# Patient Record
Sex: MALE | Race: WHITE
[De-identification: names, ages, dates, MRNs, and addresses within clinical notes are randomized per-mention and may not be internally consistent; named-entity substitution may affect disease eponyms.]

---

## 2020-04-13 ENCOUNTER — HOSPITAL ENCOUNTER (EMERGENCY)
Dept: HOSPITAL 97 - ER | Age: 70
LOS: 1 days | Discharge: ANOTHER HOSPITAL | End: 2020-04-14
Payer: COMMERCIAL

## 2020-04-13 DIAGNOSIS — I10: ICD-10-CM

## 2020-04-13 DIAGNOSIS — Z79.82: ICD-10-CM

## 2020-04-13 DIAGNOSIS — C15.9: ICD-10-CM

## 2020-04-13 DIAGNOSIS — Z88.2: ICD-10-CM

## 2020-04-13 DIAGNOSIS — K92.1: ICD-10-CM

## 2020-04-13 DIAGNOSIS — R04.2: Primary | ICD-10-CM

## 2020-04-13 DIAGNOSIS — C78.7: ICD-10-CM

## 2020-04-13 LAB
ALBUMIN SERPL BCP-MCNC: 2.1 G/DL (ref 3.4–5)
ALP SERPL-CCNC: 106 U/L (ref 45–117)
ALT SERPL W P-5'-P-CCNC: 15 U/L (ref 12–78)
AST SERPL W P-5'-P-CCNC: 12 U/L (ref 15–37)
BUN BLD-MCNC: 14 MG/DL (ref 7–18)
GLUCOSE SERPLBLD-MCNC: 134 MG/DL (ref 74–106)
HCT VFR BLD CALC: 28.8 % (ref 39.6–49)
INR BLD: 1.21
LYMPHOCYTES # SPEC AUTO: 0.8 K/UL (ref 0.7–4.9)
MAGNESIUM SERPL-MCNC: 1.7 MG/DL (ref 1.8–2.4)
NT-PROBNP SERPL-MCNC: 140 PG/ML (ref ?–125)
PMV BLD: 9.6 FL (ref 7.6–11.3)
POTASSIUM SERPL-SCNC: 3.9 MMOL/L (ref 3.5–5.1)
RBC # BLD: 3.33 M/UL (ref 4.33–5.43)
TROPONIN (EMERG DEPT USE ONLY): < 0.02 NG/ML (ref 0–0.04)

## 2020-04-13 PROCEDURE — 99285 EMERGENCY DEPT VISIT HI MDM: CPT

## 2020-04-13 PROCEDURE — 85025 COMPLETE CBC W/AUTO DIFF WBC: CPT

## 2020-04-13 PROCEDURE — 85610 PROTHROMBIN TIME: CPT

## 2020-04-13 PROCEDURE — 83735 ASSAY OF MAGNESIUM: CPT

## 2020-04-13 PROCEDURE — 86901 BLOOD TYPING SEROLOGIC RH(D): CPT

## 2020-04-13 PROCEDURE — 93005 ELECTROCARDIOGRAM TRACING: CPT

## 2020-04-13 PROCEDURE — 86900 BLOOD TYPING SEROLOGIC ABO: CPT

## 2020-04-13 PROCEDURE — 71045 X-RAY EXAM CHEST 1 VIEW: CPT

## 2020-04-13 PROCEDURE — 84484 ASSAY OF TROPONIN QUANT: CPT

## 2020-04-13 PROCEDURE — 86850 RBC ANTIBODY SCREEN: CPT

## 2020-04-13 PROCEDURE — 80048 BASIC METABOLIC PNL TOTAL CA: CPT

## 2020-04-13 PROCEDURE — 70450 CT HEAD/BRAIN W/O DYE: CPT

## 2020-04-13 PROCEDURE — 36415 COLL VENOUS BLD VENIPUNCTURE: CPT

## 2020-04-13 PROCEDURE — 83880 ASSAY OF NATRIURETIC PEPTIDE: CPT

## 2020-04-13 PROCEDURE — 36430 TRANSFUSION BLD/BLD COMPNT: CPT

## 2020-04-13 PROCEDURE — 80076 HEPATIC FUNCTION PANEL: CPT

## 2020-04-13 NOTE — EDPHYS
Physician Documentation                                                                           

 [facility]                                                                 

Name: Megan Julio                                                                         

Age: 69 yrs                                                                                       

Sex: Male                                                                                         

: 1950                                                                                   

MRN: [id number]                                                                                   

Arrival Date: 2020                                                                          

Time: 18:17                                                                                       

Account#: [id number]                                                                            

Bed 2                                                                                             

Private MD:                                                                                       

ED Physician Tho Delgado                                                                      

HPI:                                                                                              

                                                                                             

18:56 This 69 yrs old  Male presents to ER via EMS with complaints of Near Syncope.  pm1 

18:56 The patient has experienced near-syncope, felt faint.                                   pm1 

18:56 Onset: The symptoms/episode began/occurred today. Duration: This was a single episode.  pm1 

      Context: the episode(s) was witnessed, by family, wife, occurred at home, occurred          

      while the patient was standing up from sitting and walking to the table. Associated         

      injury: The patient did not suffer any apparent associated injury. Associated signs and     

      symptoms: Pertinent positives: dizziness, Pertinent negatives: abdominal pain, chest        

      pain, diarrhea, nausea, shortness of breath, vomiting. Current symptoms: Currently, the     

      patient is not experiencing any symptoms, the patient feels back to baseline.               

                                                                                                  

Historical:                                                                                       

- Allergies:                                                                                      

18:20 Sulfa (Sulfonamide Antibiotics);                                                        bp  

- Home Meds:                                                                                      

18:20 Lisinopril Oral [Active]; aspirin 81 mg Oral chew 1 tab once daily [Active]; Omeprazole bp  

      Oral [Active]; Zofran Oral [Active]; Tramadol Oral [Active];                                

- PMHx:                                                                                           

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  

                                                                                                  

- Immunization history:: Adult Immunizations up to date.                                          

- Social history:: Smoking status: Patient denies any tobacco usage or history of.                

                                                                                                  

                                                                                                  

ROS:                                                                                              

18:59 Constitutional: Negative for fever, chills, and weight loss, Cardiovascular: Negative   pm1 

      for chest pain, palpitations, and edema, Respiratory: Negative for shortness of breath,     

      cough, wheezing, and pleuritic chest pain, Abdomen/GI: Negative for abdominal pain,         

      nausea, vomiting, diarrhea, and constipation, Back: Negative for injury and pain,           

      MS/Extremity: Negative for injury and deformity, Skin: Negative for injury, rash, and       

      discoloration.                                                                              

18:59 Neuro: Positive for dizziness, Negative for headache, numbness, tingling, weakness.         

                                                                                                  

Exam:                                                                                             

18:59 Abdomen/GI: Exam negative for acute changes, Inspection: abdomen appears normal,        pm1 

      Palpation: abdomen is soft and non-tender, in all quadrants.                                

18:59 Constitutional:  This is a well developed, well nourished patient who is awake, alert,      

      and in no acute distress. Head/Face:  Normocephalic, atraumatic. Neck:  Trachea             

      midline, no thyromegaly or masses palpated, and no cervical lymphadenopathy.  Supple,       

      full range of motion without nuchal rigidity, or vertebral point tenderness.  No            

      Meningismus. Chest/axilla:  Normal chest wall appearance and motion.  Nontender with no     

      deformity.  No lesions are appreciated.                                                     

18:59 Abdomen/GI:  Soft, non-tender, with normal bowel sounds.  No distension or tympany.  No     

      guarding or rebound.  No evidence of tenderness throughout. Back:  No spinal                

      tenderness.  No costovertebral tenderness.  Full range of motion. Skin:  Warm, dry with     

      normal turgor.  Normal color with no rashes, no lesions, and no evidence of cellulitis.     

      MS/ Extremity:  Pulses equal, no cyanosis.  Neurovascular intact.  Full, normal range       

      of motion.                                                                                  

18:59 Cardiovascular: Exam negative for  acute changes, Rate: normal, Rhythm: regular,            

      Pulses: no pulse deficits are appreciated, Edema: is not appreciated.                       

18:59 Respiratory: Exam negative for  acute changes, respiratory distress, shortness of           

      breath, the patient does not display signs of respiratory distress,  Respirations:          

      normal.                                                                                     

18:59 Neuro: Exam negative for acute changes, focal neuro deficits, Orientation: is normal,       

      Mentation: is normal, Motor: is normal, moves all fours.                                    

                                                                                                  

Vital Signs:                                                                                      

18:17  / 69; Pulse 90; Resp 20; Temp 97.5; Pulse Ox 100% ; Weight 72.12 kg; Height 6    bp  

      ft. 1 in. (185.42 cm);                                                                      

18:30 BP 98 / 71; Pulse 85; Resp 20; Pulse Ox 100% ;                                          bp  

18:55 BP 90 / 70 Supine; Pulse 88; Resp 17; Pulse Ox 99% ;                                    hb  

18:55 BP 68 / 49 Sitting; Pulse 116; Resp 18; Pulse Ox 99% ;                                  hb  

19:40  / 64; Pulse 88; Resp 15; Pulse Ox 100% on R/A; Pain 0/10;                        ao  

20:13  / 67; Pulse 89; Resp 18; Pulse Ox 99% ;                                          ao  

21:20 BP 87 / 64; Pulse 96; Resp 17; Pulse Ox 99% ;                                           rr5 

21:55 BP 74 / 58; Pulse 99; Resp 18; Temp 97.2(TE); Pulse Ox 100% ;                           ao  

22:15 BP 67 / 58; Pulse 100; Resp 20; Temp 97.0(TE); Pulse Ox 99% on R/A;                     ao  

22:30 BP 77 / 65; Pulse 96; Resp 22; Temp 96.8(TE); Pulse Ox 100% ;                           ao  

22:36  / 69; Pulse 85; Resp 18; Pulse Ox 100% ;                                         ao  

23:00  / 77; Pulse 87; Resp 19; Pulse Ox 100% on R/A;                                   ao  

23:22  / 74; Pulse 87; Resp 16; Pulse Ox 100% on R/A;                                   ao  

18:17 Body Mass Index 20.98 (72.12 kg, 185.42 cm)                                             bp  

                                                                                                  

MDM:                                                                                              

18:22 Patient medically screened.                                                             higinio 

18:59 Data reviewed: vital signs. Data interpreted: Pulse oximetry: on room air is 100 %.     pm1 

      Interpretation: normal.                                                                     

20:40 ED course: Patient with bright red blood vomit at bedside. Estimate approximately 250   pm1 

      mL on floor. Patient with bowel movement afterwards - melena. Ordered blood                 

      transfusion, protonix, octreotide, zofran.                                                  

21:11 Counseling: I had a detailed discussion with the patient and/or guardian regarding: the pm1 

      historical points, exam findings, and any diagnostic results supporting the                 

      discharge/admit diagnosis, lab results, the need to transfer to another facility, for       

      higher level of care, Esophageal stent placement 6 days ago by Dr. Kacie Cartwright at [facility].                                                                      

22:11 Physician consultation: MICU Intensivist Dr. De Santiago regarding regarding transfer,      pm1 

      patient's condition, and will see patient Would like 2 liters LR Bolus, Additional          

      peripheral access, 2 Units of PRBCs. If patient vomits again, then intubate the patient     

      prior to transfer and update him.                                                           

                                                                                                  

                                                                                             

18:26 Order name: Basic Metabolic Panel; Complete Time: 19:15                                 pm1 

                                                                                             

18:26 Order name: CBC with Diff; Complete Time: 18:56                                         pm1 

                                                                                             

18:26 Order name: LFT's; Complete Time: 19:15                                                 pm1 

                                                                                             

18:26 Order name: Magnesium; Complete Time: 19:15                                             pm1 

                                                                                             

18:26 Order name: NT PRO-BNP; Complete Time: 19:15                                            pm                                                                                             

18:26 Order name: PT-INR; Complete Time: 18:56                                                pm1 

                                                                                             

18:26 Order name: Troponin (emerg Dept Use Only); Complete Time: 19:15                        pm1 

                                                                                             

18:26 Order name: XRAY Chest (1 view); Complete Time: 19:15                                   pm                                                                                             

18:26 Order name: CT Head Brain wo Cont; Complete Time: 19:15                                 pm1 

                                                                                             

20:35 Order name: Type And Screen                                                             pm1 

                                                                                             

21:08 Order name: Packed RBC Leukored                                                         EDMS

                                                                                             

21:49 Order name: ABO/RH no charge; Complete Time: 21:52                                      EDMS

                                                                                             

18:26 Order name: Cardiac monitoring; Complete Time: 18:29                                    pm1 

                                                                                             

18:26 Order name: EKG - Nurse/Tech; Complete Time: 19:41                                      pm1 

                                                                                             

18:26 Order name: IV Saline Lock; Complete Time: 18:29                                        pm1 

                                                                                             

18:26 Order name: Labs collected and sent; Complete Time: 18:37                               pm1 

                                                                                             

18:26 Order name: O2 Per Protocol; Complete Time: 18:29                                       pm                                                                                             

18:26 Order name: O2 Sat Monitoring; Complete Time: 18:29                                     pm1 

                                                                                             

18:26 Order name: Orthostatic Blood Pressure; Complete Time: 19:03                            pm1 

                                                                                             

20:35 Order name: Transfuse; Complete Time: 23:38                                             pm1 

                                                                                                  

Administered Medications:                                                                         

18:56 Drug: NS 0.9% 1000 ml Route: IV; Rate: 1000 ml; Site: left antecubital;                 bp  

20:00 Follow up: IV Status: Completed infusion; IV Intake: 100ml                              ao  

19:04 Drug: NS 0.9% 1000 ml Route: IV; Rate: 1000 ml; Site: left antecubital;                 hb  

23:52 Follow up: IV Status: Completed infusion; IV Intake: 1000ml                             ao  

19:40 Drug: Magnesium Sulfate 1 grams Route: IVPB; Infused Over: 1 hrs; Site: left            ao  

      antecubital;                                                                                

21:00 Follow up: IV Status: Completed infusion; IV Intake: 100ml                              ao  

21:10 Drug: Zofran (Ondansetron) 4 mg Route: IVP; Site: right antecubital;                    rr5 

23:53 Follow up: Response: No adverse reaction                                                ao  

21:12 Drug: ProTONIX 40 mg Route: IVP; Site: right antecubital;                               rr5 

23:53 Follow up: Response: No adverse reaction                                                ao  

21:14 Drug: Octreotide 50 mcg Route: IV; Rate: calculated rate; Site: right antecubital;      rr5 

23:53 Follow up: IV Status: Completed infusion                                                ao  

21:16 Drug: Octreotide Infusion (50 mcg/hr) - (Octreotide 500 mcg, NS 0.9% 500 ml) Route: IV; rr5 

      Rate: 50 ml/hr; Site: right antecubital;                                                    

23:53 Follow up: IV Status: Infusion continued upon transfer                                  ao  

21:18 Drug: ProTONIX 8 mg/hr Route: IV; Rate: 25 ml/hr; Site: left antecubital;               rr5 

23:53 Follow up: IV Status: Infusion continued upon admission                                 ao  

22:16 Drug: Lactated Ringers Solution 2000 ml Route: IV; Rate: 1000 ml/hr; Site: left         jd3 

      antecubital;                                                                                

23:54 Follow up: IV Status: Infusion continued upon transfer                                  ao  

22:26 Drug: Benadryl 12.5 mg Route: IVP; Site: left antecubital;                              rr5 

23:54 Follow up: Response: No adverse reaction                                                ao  

22:26 Drug: Solu-CORTEF 50 mg Route: IVP; Site: left antecubital;                             rr5 

23:54 Follow up: Response: No adverse reaction                                                ao  

23:45 Drug: Zofran (Ondansetron) 4 mg Route: IVP; Site: left wrist;                           ao  

23:54 Follow up: Response: No adverse reaction                                                ao  

                                                                                                  

                                                                                                  

Disposition:                                                                                      

                                                                                             

14:00 Co-signature as Attending Physician, Tho Delgado MD I agree with the assessment and  higinio 

      plan of care.                                                                               

                                                                                                  

Disposition:                                                                                      

20 21:16 Transfer ordered to [facility]. Diagnosis are           

  Gastrointestinal hemorrhage, unspecified, Near syncope.                                         

- Reason for transfer: [facility].                                

- Accepting physician is [facility].                                                           

- Condition is Stable.                                                                            

- Problem is new.                                                                                 

- Symptoms have improved.                                                                         

                                                                                                  

                                                                                                  

                                                                                                  

Signatures:                                                                                       

Dispatcher [employer]                           Tho Omer MD MD cha Ortiz, Alex, RN                         RN   ao                                                   

Enzo Wallis, NP                    NP   pm1                                                  

Brenda Cordova, RN                     RN   Saeed Pacheco, DANIEL                    RN   jd3                                                  

G[place], RN                      RN   bp                                                   

George Fox, RN                      RN   rr5                                                  

                                                                                                  

Corrections: (The following items were deleted from the chart)                                    

                                                                                             

21:20 21:16 2020 21:16 Transfer ordered to [facility].       pm1 

      Diagnosis is Gastrointestinal hemorrhage, unspecified. Reason for transfer: Private         

      Physician at [facility]. Accepting physician is [facility]. Condition is      

      Stable. Problem is new. Symptoms have improved. pm1                                         

                                                                                             

00:07  21:20 2020 21:16 Transfer ordered to [facility]. ao  

      Diagnosis is Gastrointestinal hemorrhage, unspecified; Near syncope. Reason for             

      transfer: [facility] Physician at [facility]. Accepting physician is [facility]     

      [facility]. Condition is Stable. Problem is new. Symptoms have improved. pm1                        

                                                                                                  

**************************************************************************************************

## 2020-04-13 NOTE — RAD REPORT
EXAM DESCRIPTION:  CT - Head Brain Wo Cont - 4/13/2020 6:38 pm

 

CLINICAL HISTORY:  DIZZINESS

 

COMPARISON:  HEAD BRAIN W O CONTRAST dated 11/6/2013

 

TECHNIQUE:  Axial 5 mm thick images of the head were obtained without IV contrast.

 

All CT scans are performed using dose optimization technique as appropriate and may include automated
 exposure control or mA/KV adjustment according to patient size.

 

FINDINGS:  No intracranial hemorrhage, mass, edema or shift of mid-line structures. No acute infarcti
on changes seen. No abnormal extra-axial fluid collections. Mild atrophy changes are present. Ventric
les are in proportion. Mild chronic ischemic change also evident.

 

Mastoid air cells and visualized portions of the paranasal sinuses are clear.

 

No acute bony findings.

 

 

IMPRESSION:  No acute intracranial finding identifiable.

 

Mild atrophy and mild chronic ischemic changes are present similar to comparison.

## 2020-04-13 NOTE — XMS REPORT
Patient Summary Document

                            Created on:2020



Patient:KAYLI DODSON

Sex:Male

:1950

External Reference #:[id number]





Demographics







                          Address                   [address] [place] [address]

 

                          Home Phone                [phone]

 

                          Work Phone                [phone]

 

                          Email Address             NONE

 

                          Preferred Language        Unknown

 

                          Marital Status            Unknown

 

                          Buddhist Affiliation     Unknown

 

                          Race                      Unknown

 

                          Ethnic Group              Unknown









Author







                          Organization              [facility]

t

 

                          Address                   [address] [place] [address] [place] [id number]

 

                          Phone                     [phone]









Care Team Providers







                    Name                Role                Phone

 

                    Unavailable         Unavailable         Unavailable









Problems

This patient has no known problems.



Allergies, Adverse Reactions, Alerts

This patient has no known allergies or adverse reactions.



Medications

This patient has no known medications.



Results







           Test Description Test Time  Test Comments Text Results Atomic Results

 Result 

Comments









             CATHIE LA,  2020   Reason for   FINAL REPORT PATIENT 



             NON-SPECIFIC, UP TO 1 12:58:00     exam:->dysphagia ID:   [id number] 

A 



             HOUR                                   fluoroscopic unit was 



                                                    utilized for a 



                                                    procedure performed 



                                                    in the operating 



                                                    room. No     



                                                    interpretation was 



                                                    requested. Please 



                                                    refer to the 



                                                    operative report 



                                                    regarding findings. 



                                                    Please refer to PACS 



                                                    for patient radiation 



                                                    dose information. 



                                                    Signed: JR Borrego Robert MDReport 



                                                    Verified Date/Time: 



                                                    2020 12:58:42 



                                                    Reading Location: [facility] 



                                                    Reading Room 



                                                    Electronically signed 



                                                    by: ALFREDO BORREGO on 



                                                    2020 12:58 PM

## 2020-04-13 NOTE — ER
Nurse's Notes                                                                                     

 [facility]                                                                 

Name: Megan Julio                                                                         

Age: 69 yrs                                                                                       

Sex: Male                                                                                         

: 1950                                                                                   

MRN: [id number]                                                                                   

Arrival Date: 2020                                                                          

Time: 18:17                                                                                       

Account#: [id number]                                                                            

Bed 2                                                                                             

Private MD:                                                                                       

Diagnosis: Gastrointestinal hemorrhage, unspecified;Near syncope                                  

                                                                                                  

Presentation:                                                                                     

                                                                                             

18:17 Chief complaint: EMS states: NEAR-SYNCOPE AT HOME WITH HYPOTENSION. Coronavirus screen: bp  

      Proceed with normal triage. Ebola Screen: No symptoms or risks identified at this time.     

      Initial Sepsis Screen: Does the patient meet any 2 criteria? No. Patient's initial          

      sepsis screen is negative. Does the patient have a suspected source of infection? No.       

      Patient's initial sepsis screen is negative. Risk Assessment: Do you want to hurt           

      yourself or someone else? Patient reports no desire to harm self or others. Onset of        

      symptoms was 2020.                                                                

18:17 Method Of Arrival: EMS: [facility]                                                bp  

18:17 Acuity: BRETT 3                                                                           bp  

18:21 Care prior to arrival: IV initiated. 20 GA, in the left antecubital area, Glucose       bp  

      check: 130.                                                                                 

                                                                                                  

Triage Assessment:                                                                                

18:20 General: Appears in no apparent distress. comfortable, Behavior is calm, cooperative,   bp  

      appropriate for age. Pain: Denies pain. EENT: No deficits noted. Neuro: Level of            

      Consciousness is awake, alert, obeys commands, Oriented to person, place, time,             

      situation, Appropriate for age. Cardiovascular: No deficits noted. Rhythm is sinus          

      rhythm. Respiratory: No deficits noted. GI: No signs and/or symptoms were reported          

      involving the gastrointestinal system. : No signs and/or symptoms were reported           

      regarding the genitourinary system. Derm: No deficits noted. Musculoskeletal: No            

      deficits noted.                                                                             

                                                                                                  

Historical:                                                                                       

- Allergies:                                                                                      

18:20 Sulfa (Sulfonamide Antibiotics);                                                        bp  

- Home Meds:                                                                                      

18:20 Lisinopril Oral [Active]; aspirin 81 mg Oral chew 1 tab once daily [Active]; Omeprazole bp  

      Oral [Active]; Zofran Oral [Active]; Tramadol Oral [Active];                                

- PMHx:                                                                                           

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  

                                                                                                  

- Immunization history:: Adult Immunizations up to date.                                          

- Social history:: Smoking status: Patient denies any tobacco usage or history of.                

                                                                                                  

                                                                                                  

Screenin:22 Abuse screen: Denies threats or abuse. Denies injuries from another. Nutritional        bp  

      screening: No deficits noted. Tuberculosis screening: No symptoms or risk factors           

      identified. Fall Risk None identified.                                                      

                                                                                                  

Assessment:                                                                                       

18:22 General: SEE TRIAGE NOTE.                                                               bp  

18:40 Reassessment: PT TO RADIOLOGY.                                                          bp  

18:56 Reassessment: PT RETURNED FROM CT.                                                      bp  

19:15 Reassessment: Patient appears in no apparent distress at this time. Patient and/or      ao  

      family updated on plan of care and expected duration. Pain level reassessed. Patient is     

      alert, oriented x 3, equal unlabored respirations, skin warm/dry/pink. Waiting on Dispo     

      orders.                                                                                     

20:13 Reassessment: Patient appears in no apparent distress at this time. Patient and/or      ao  

      family updated on plan of care and expected duration. Pain level reassessed.                

20:15 Reassessment: josy (patient's wife ) spoke to her for the plan of care and agreed.  rr5 

      josy's number [phone].                                                               

20:55 GI: Pt is actively vomiting bright red blood, Reports nausea, vomiting, went to         rr5 

      restroom assisted by [place], positive (blood mix with stool). ED provider aware with       

      order made and carried out.                                                                 

21:05 Reassessment: Patient was moved from his room because he was projectile vomiting red    ao  

      blood.                                                                                      

21:27 Reassessment: Notified PAT Giraldo of patient low BP.                                   ao  

21:30 Reassessment: Called Lab to check ET for blood to be ready. Per lab blood should be     ao  

      ready in 10 to 20 min. Lab will call back when ready.                                       

22:00 Reassessment: Patient appears in no apparent distress at this time. Patient and/or      ao  

      family updated on plan of care and expected duration. Pain level reassessed. Started        

      first unit.                                                                                 

23:00 Reassessment: Report given to DANIEL Deluna. Patient understand and agree with POC.        ao  

      Waiting on EMS for transportation.                                                          

23:00 Reassessment: Patient and/or family updated on plan of care and expected duration. Pain ao  

      level reassessed. Patient is alert, oriented x 3, equal unlabored respirations, skin        

      warm/dry/pink. Started second unit.                                                         

23:57 Reassessment: Patient appears in no apparent distress at this time. Patient and/or      ao  

      family updated on plan of care and expected duration. Pain level reassessed. Hand off       

      care to  EMS. Patient VS stable. No questions at this time.                               

                                                                                                  

Vital Signs:                                                                                      

18:17  / 69; Pulse 90; Resp 20; Temp 97.5; Pulse Ox 100% ; Weight 72.12 kg; Height 6    bp  

      ft. 1 in. (185.42 cm);                                                                      

18:30 BP 98 / 71; Pulse 85; Resp 20; Pulse Ox 100% ;                                          bp  

18:55 BP 90 / 70 Supine; Pulse 88; Resp 17; Pulse Ox 99% ;                                    hb  

18:55 BP 68 / 49 Sitting; Pulse 116; Resp 18; Pulse Ox 99% ;                                  hb  

19:40  / 64; Pulse 88; Resp 15; Pulse Ox 100% on R/A; Pain 0/10;                        ao  

20:13  / 67; Pulse 89; Resp 18; Pulse Ox 99% ;                                          ao  

21:20 BP 87 / 64; Pulse 96; Resp 17; Pulse Ox 99% ;                                           rr5 

21:55 BP 74 / 58; Pulse 99; Resp 18; Temp 97.2(TE); Pulse Ox 100% ;                           ao  

22:15 BP 67 / 58; Pulse 100; Resp 20; Temp 97.0(TE); Pulse Ox 99% on R/A;                     ao  

22:30 BP 77 / 65; Pulse 96; Resp 22; Temp 96.8(TE); Pulse Ox 100% ;                           ao  

22:36  / 69; Pulse 85; Resp 18; Pulse Ox 100% ;                                         ao  

23:00  / 77; Pulse 87; Resp 19; Pulse Ox 100% on R/A;                                   ao  

23:22  / 74; Pulse 87; Resp 16; Pulse Ox 100% on R/A;                                   ao  

18:17 Body Mass Index 20.98 (72.12 kg, 185.42 cm)                                             bp  

                                                                                                  

ED Course:                                                                                        

18:17 Patient arrived in ED.                                                                  bp  

18:18 Triage completed.                                                                       bp  

18:18 Patient has correct armband on for positive identification. Bed in low position. Call   jp3 

      light in reach. Side rails up X 1. Side rails up X2. Warm blanket given. Verbal             

      reassurance given. Cardiac monitor on. Pulse ox on. NIBP on.                                

18:18 Maintain EMS IV. Dressing intact. Good blood return noted. Site clean \T\ dry. Gauge \T\    nicolasa
3

      site: 20-gauge LAC. Patient maintains SpO2 saturation greater than 95% on room air.         

18:19 Enzo Wallis NP is PHCP.                                                           pm1 

18:19 Tho Delgado MD is Attending Physician.                                             pm1 

18:20 Arm band placed on.                                                                     bp  

18:29 [place], DANIEL is Primary Nurse.                                                    bp  

18:38 CT Head Brain wo Cont In Process Unspecified.                                           EDMS

18:44 XRAY Chest (1 view) In Process Unspecified.                                             EDMS

19:14 Report received from DANIEL Carroll.                                                         ao  

19:15 EKG done, by ED staff.                                                                  ao  

21:10 Inserted saline lock: 20 gauge in right antecubital area, using aseptic technique.      rr5 

      Blood collected.                                                                            

22:07 Inserted saline lock: 22 gauge in left wrist, using aseptic technique.                  jd3 

23:58 No provider procedures requiring assistance completed. Patient transferred, IV remains  ao  

      in place.                                                                                   

                                                                                                  

Administered Medications:                                                                         

18:56 Drug: NS 0.9% 1000 ml Route: IV; Rate: 1000 ml; Site: left antecubital;                 bp  

20:00 Follow up: IV Status: Completed infusion; IV Intake: 100ml                              ao  

19:04 Drug: NS 0.9% 1000 ml Route: IV; Rate: 1000 ml; Site: left antecubital;                 hb  

23:52 Follow up: IV Status: Completed infusion; IV Intake: 1000ml                             ao  

19:40 Drug: Magnesium Sulfate 1 grams Route: IVPB; Infused Over: 1 hrs; Site: left            ao  

      antecubital;                                                                                

21:00 Follow up: IV Status: Completed infusion; IV Intake: 100ml                              ao  

21:10 Drug: Zofran (Ondansetron) 4 mg Route: IVP; Site: right antecubital;                    rr5 

23:53 Follow up: Response: No adverse reaction                                                ao  

21:12 Drug: ProTONIX 40 mg Route: IVP; Site: right antecubital;                               rr5 

23:53 Follow up: Response: No adverse reaction                                                ao  

21:14 Drug: Octreotide 50 mcg Route: IV; Rate: calculated rate; Site: right antecubital;      rr5 

23:53 Follow up: IV Status: Completed infusion                                                ao  

21:16 Drug: Octreotide Infusion (50 mcg/hr) - (Octreotide 500 mcg, NS 0.9% 500 ml) Route: IV; rr5 

      Rate: 50 ml/hr; Site: right antecubital;                                                    

23:53 Follow up: IV Status: Infusion continued upon transfer                                  ao  

21:18 Drug: ProTONIX 8 mg/hr Route: IV; Rate: 25 ml/hr; Site: left antecubital;               rr5 

23:53 Follow up: IV Status: Infusion continued upon admission                                 ao  

22:16 Drug: Lactated Ringers Solution 2000 ml Route: IV; Rate: 1000 ml/hr; Site: left         jd3 

      antecubital;                                                                                

23:54 Follow up: IV Status: Infusion continued upon transfer                                  ao  

22:26 Drug: Benadryl 12.5 mg Route: IVP; Site: left antecubital;                              rr5 

23:54 Follow up: Response: No adverse reaction                                                ao  

22:26 Drug: Solu-CORTEF 50 mg Route: IVP; Site: left antecubital;                             rr5 

23:54 Follow up: Response: No adverse reaction                                                ao  

23:45 Drug: Zofran (Ondansetron) 4 mg Route: IVP; Site: left wrist;                           ao  

23:54 Follow up: Response: No adverse reaction                                                ao  

                                                                                                  

                                                                                                  

Intake:                                                                                           

20:00 IV: 100ml; Total: 100ml.                                                                ao  

21:00 IV: 100ml; Total: 200ml.                                                                ao  

23:52 IV: 1000ml; Total: 1200ml.                                                              ao  

                                                                                                  

Outcome:                                                                                          

21:16 ER care complete, transfer ordered by MD.                                               pm1 

23:58 Transferred by ground EMS to [facility], Transfer form completed.    ao  

23:58 Condition: stable                                                                           

23:58 Instructed on the need for transfer.                                                        

                                                                                             

00:07 Patient left the ED.                                                                    ao  

                                                                                                  

Signatures:                                                                                       

Dispatcher [employer]                           EDMS                                                 

Ismael Mora RN                         RN   ao                                                   

Enzo Wallis, NP                    NP   pm1                                                  

Brenda Cordova RN RN hb Davies, Jonathon, RN RN   jGlen Swanson RN RN bp Pisarski, Jacob                              jp3                                                  

George Fox RN                      RN   rr5                                                  

                                                                                                  

**************************************************************************************************

## 2020-04-13 NOTE — RAD REPORT
EXAM DESCRIPTION:  RAD - Chest Single View - 4/13/2020 6:44 pm

 

CLINICAL HISTORY:  Near syncope, hypotension, CVA history

 

COMPARISON:  Two view chest March 5

 

TECHNIQUE:  AP portable chest image was obtained 4/13/2020 6:44 pm .

 

FINDINGS:  Lung volumes are low compared to prior imaging study. No focal lung parenchymal process. N
o failure or volume overload. Right-sided Port-A-Cath has been placed since prior imaging. Heart and 
vasculature are normal. No measurable pleural effusion and no pneumothorax. No acute bony abnormality
 seen. No acute aortic findings suspected.

 

IMPRESSION:  No acute cardiopulmonary process.

 

No worrisome change from comparison.

## 2020-04-14 VITALS — DIASTOLIC BLOOD PRESSURE: 74 MMHG | SYSTOLIC BLOOD PRESSURE: 114 MMHG

## 2020-04-14 VITALS — TEMPERATURE: 96.8 F | OXYGEN SATURATION: 100 %

## 2020-04-14 PROCEDURE — 30233N1 TRANSFUSION OF NONAUTOLOGOUS RED BLOOD CELLS INTO PERIPHERAL VEIN, PERCUTANEOUS APPROACH: ICD-10-PCS

## 2020-04-14 NOTE — EKG
Test Date:    2020-04-13               Test Time:    19:12:02

Technician:   HERBER                                     

                                                     

MEASUREMENT RESULTS:                                       

Intervals:                                           

Rate:         79                                     

MD:           136                                    

QRSD:         86                                     

QT:           388                                    

QTc:          444                                    

Axis:                                                

P:            61                                     

MD:           136                                    

QRS:          48                                     

T:            64                                     

                                                     

INTERPRETIVE STATEMENTS:                                       

                                                     

Normal sinus rhythm

Normal ECG

Compared to ECG 11/06/2013 17:12:30

ST (T wave) deviation no longer present



Electronically Signed On 04-14-20 11:29:55 CDT by Maurice Perez

## 2020-07-04 ENCOUNTER — HOSPITAL ENCOUNTER (EMERGENCY)
Dept: HOSPITAL 97 - ER | Age: 70
Discharge: ANOTHER HOSPITAL | End: 2020-07-04
Payer: COMMERCIAL

## 2020-07-04 VITALS — SYSTOLIC BLOOD PRESSURE: 139 MMHG | TEMPERATURE: 98.2 F | DIASTOLIC BLOOD PRESSURE: 81 MMHG | OXYGEN SATURATION: 99 %

## 2020-07-04 DIAGNOSIS — I10: ICD-10-CM

## 2020-07-04 DIAGNOSIS — Z88.2: ICD-10-CM

## 2020-07-04 DIAGNOSIS — K22.2: Primary | ICD-10-CM

## 2020-07-04 DIAGNOSIS — Z85.05: ICD-10-CM

## 2020-07-04 DIAGNOSIS — Z85.89: ICD-10-CM

## 2020-07-04 DIAGNOSIS — Z85.01: ICD-10-CM

## 2020-07-04 DIAGNOSIS — Z79.82: ICD-10-CM

## 2020-07-04 LAB
ALBUMIN SERPL BCP-MCNC: 2.7 G/DL (ref 3.4–5)
ALP SERPL-CCNC: 102 U/L (ref 45–117)
ALT SERPL W P-5'-P-CCNC: 22 U/L (ref 12–78)
ANISOCYTOSIS BLD QL: (no result)
AST SERPL W P-5'-P-CCNC: 20 U/L (ref 15–37)
BLD SMEAR INTERP: (no result)
BUN BLD-MCNC: 10 MG/DL (ref 7–18)
GLUCOSE SERPLBLD-MCNC: 94 MG/DL (ref 74–106)
HCT VFR BLD CALC: 37.3 % (ref 39.6–49)
LIPASE SERPL-CCNC: 83 U/L (ref 73–393)
LYMPHOCYTES # SPEC AUTO: 0.9 K/UL (ref 0.7–4.9)
MORPHOLOGY BLD-IMP: (no result)
PMV BLD: 9.3 FL (ref 7.6–11.3)
POTASSIUM SERPL-SCNC: 3.6 MMOL/L (ref 3.5–5.1)
RBC # BLD: 4.13 M/UL (ref 4.33–5.43)

## 2020-07-04 PROCEDURE — 36415 COLL VENOUS BLD VENIPUNCTURE: CPT

## 2020-07-04 PROCEDURE — 80076 HEPATIC FUNCTION PANEL: CPT

## 2020-07-04 PROCEDURE — 85025 COMPLETE CBC W/AUTO DIFF WBC: CPT

## 2020-07-04 PROCEDURE — 83690 ASSAY OF LIPASE: CPT

## 2020-07-04 PROCEDURE — 80048 BASIC METABOLIC PNL TOTAL CA: CPT

## 2020-07-04 PROCEDURE — 99285 EMERGENCY DEPT VISIT HI MDM: CPT

## 2020-07-04 PROCEDURE — 96366 THER/PROPH/DIAG IV INF ADDON: CPT

## 2020-07-04 PROCEDURE — 96365 THER/PROPH/DIAG IV INF INIT: CPT

## 2020-07-04 NOTE — XMS REPORT
Clinical Summary

                             Created on:2020



Patient:Megan Avery

Sex:Male

:1950

External Reference #:[id number]





Demographics







                          Address                   [address] [place] [address]

 

                          Home Phone                [phone]

 

                          Mobile Phone              [phone]

 

                          Email Address             [email or web address]

 

                          Preferred Language        English

 

                          Marital Status            

 

                          Denominational Affiliation     Unknown

 

                          Race                      White

 

                          Ethnic Group              Not  or 









Author







                          Organization              [employer]

 

                          Address                   [address] [place] [address]









Support







                Name            Relationship    Address         Phone

 

                Josephine Avery Spouse          [address]  [phone] 



                                                [place] [address] 









Care Team Providers







                    Name                Role                Phone

 

                    Ramu Harris A       Primary Care Provider [phone]









Allergies







             Active Allergy Reactions    Severity     Noted Date   Comments

 

             Sulfa (Sulfonamide Other (See Comments)              2020   F

judy like symptoms



             Antibiotics)                                        







Medications







          Medication Sig       Dispensed Refills   Start Date End Date  Status

 

          aspirin (ECOTRIN) 81 Take 81 mg by           0                        

     Active



          MG enteric coated mouth daily.                                        

 



          tablet                                                      

 

          pantoprazole Take 40 mg by           0         2020           Ac

tive



          (PROTONIX) 40 MG EC mouth 2 (two)                                     

    



          tablet    times a day.                                         

 

          ondansetron ODT Take 8 mg by           0                             A

ctive



          (ZOFRAN-ODT) 8 MG mouth 2 (two)                                       

  



          disintegrating tablet times a day.                                    

     

 

          traMADoL (ULTRAM) 50 Take 50 mg by           0         2020     

      Active



          mg tablet mouth.                                            

 

          lisinopriL (PRINIVIL)                     0         2020

02 Discontinued



          5 mg tablet                                         0         

 

          prochlorperazine Take 1 tablet 30 tablet 5         2020

2 



          (Compazine) 5 MG (5 mg total)                               0         



          tabletIndications: GE by mouth                                        

  



          junction carcinoma every 6 (six)                                      

   



          (HCC)     hours as                                          



                    needed for                                         



                    nausea or                                         



                    vomiting for                                         



                    up to 30                                          



                    days.                                             

 

          ondansetron ODT Take 1 tablet 30 tablet 5         2020

 



          (Zofran ODT) 8 MG (8 mg total)                               0        

 



          disintegrating by mouth                                          



          tabletIndications: GE every 8                                         

  



          junction carcinoma (eight) hours                                      

   



          (HCC)     as needed for                                         



                    nausea or                                         



                    vomiting for                                         



                    up to 30                                          



                    days.                                             

 

          dronabinoL (Marinol) Take 1    20 capsule 0         2020

02 



          2.5 MG capsule capsule (2.5                               0         



                    mg total) by                                         



                    mouth 2 (two)                                         



                    times a day                                         



                    before meals                                         



                    for 10 days.                                         







Active Problems







                          Problem                   Noted Date

 

                          Hypotension due to drugs  2020

 

                          Dehydration               2020

 

                          GE junction carcinoma     2020

 

                          Liver metastases          2020







Resolved Problems







                    Problem             Noted Date          Resolved Date

 

                    Gastrointestinal hemorrhage associated with gastric ulcer 2020

 

                    Esophageal dysphagia 2020







Encounters







             Date         Type         Specialty    Care Team    Description

 

             2020   Infusion     Oncology     Nikolas Mcgregork  GE junction car

sophiaoma



                                                    MD Jose   (HCC) (Primary 

Dx)

 

             2020   Office Visit Oncology     Matt Mcgregor  GE junction car

cinoma (HCC) 

(Primary Dx);



                                                    MD Jose   Liver metastase

s (HCC)

 

             2020   Travel                                 

 

             2020   Orders Only  Oncology     Matt Mcgregor  GE junction car

sophiaoma



                                                    MD Jose   (HCC) (Primary 

Dx)

 

             2020   Oncology     Oncology     Champagne,   



                          [facility]              Cara, DANIEL 

 

             2020   Infusion     Oncology     Matt Mcgregor  GE junction car

cinoma (HCC) (Primary 

Dx);



                                                    MD Jose   Liver metastase

s (HCC)

 

             2020   Orders Only  Oncology     Jazlyn Wood, DANIEL     

 

             2020   Orders Only  Oncology     Savannah Bolivar, [facility]  

 

             2020   Oncology     Oncology     ChampagnZoe jenkins, DANIEL 

 

             2020   Travel                                 

 

             2020   Orders Only  Pharmacy     Khadijah Tejada, [facility] 

 

             2020   Transcribe Orders Access       Kacie Cartwright Malignant 

neoplasm of 

esophagus, unspecified (HCC) (Primary Dx);



                                                    MD JOHNATHAN       Dysphagia, unsp

ecified;



                                                                 Pre-operative c

learance

 

             2020   Oncology     Oncology     Champagne,   



                          [facility]              Cara, DANIEL 

 

             2020   Telephone    Rehabilitation Genet Davila MT-BC        

 

             2020   Nurse Only   Oncology     Nikolas Mcgregork  GE junction car

cinoma (HCC) (Primary 

Dx);



                                                    MD Jose   Dehydration

 

             2020   Orders Only  Oncology     Martita Ji, [facility] 

 

             2020   Travel                                 

 

             2020   Oncology     Oncology     Champagne,   



                          [facility]              Cara, DANIEL 

 

             2020   Orders Only  Oncology     Jazlyn Wood RN     

 

             2020   Oncology     Oncology     Champagne   



                          [facility]              Cara, DANIEL 

 

             06/15/2020   Infusion     Oncology     Matt Mcgregor  GE junction car

bob Garcia MD   (HCC) (Primary 

Dx)

 

             06/15/2020   Oncology     Oncology     ChampagneZoe, DANIEL 

 

             06/15/2020   Travel                                 

 

             2020   Orders Only  Khadijah Franklin RPH 

 

             06/10/2020   Hospital Encounter Radiology    Kacie Cartwright Abnormal 

findings on



                                                    M., MD       diagnostic imag

ing of



                                                                 other parts of



                                                                 digestive tract

 

             06/10/2020   Surgery      Gastroenterology Kacie Cartwright EGD W/ STEN

T W/



                                                    MD LACHO MANN

 

                06/10/2020      Anesthesia Event Gastroenterology Lisset Mullins MD Crawley, Teri, 



                                                    PAT           

 

             06/10/2020   Hospital Encounter Gastroenterology Kacie Cartwright MD       

 

             2020   Travel                                 

 

             2020   Travel                                 

 

             2020   Telephone    Oncology     Matt Mcgregor MD   

 

             2020   Oncology     Oncology     Champagne,   



                          [facility]              Cara, DANIEL 

 

             2020   Telephone    Oncology     Wood Lotti GE junction car

cinoma (HCC) (Primary 

Dx);



                                                    DANIEL Smith     Dysphagia, unsp

ecified type

 

             2020   Orders Only  Oncology     Wood, Lotti GE junction car

bob Smith RN     (HCC) (Primary 

Dx)

 

             2020   Oncology     Oncology     ChampagneZoe, DANIEL 

 

             2020   Nurse Only   Oncology     Matt Mcgregor  GE junction car

bob Garcia MD   (HCC) (Primary 

Dx)

 

             2020   Travel                                 

 

             2020   Orders Only  Oncology     Payal Conde RN  

 

             2020   Office Visit Oncology     Matt Mcgregor  GE junction car

cinoma (HCC) 

(Primary Dx);



                                                    MD Jose   Liver metastase

s (HCC)

 

             2020   Infusion     Oncology     Matt Mcgregor  GE junction car

cinoma (HCC) (Primary 

Dx);



                                                    MD Jose   Liver metastase

s (HCC)

 

             2020   Hospital Encounter Radiology    Matt Mcgregor juncti

on carcinoma (HCC);



                                                    MD Jose   Liver metastase

s (HCC)

 

             2020   Oncology     Oncology     Zoe Jovel RN 

 

             2020   Travel                                 

 

             05/15/2020   Office Visit Cardiology   [place],      Palpitations (P

rimary Dx);



                                                    [place]    GE junction car

bob (HCC)



                                                    PAT Dill    

 

             05/15/2020   Nurse Only   Oncology     Matt Mcgregor  GE junction car

bob Garcia MD   (HCC) (Primary 

Dx)

 

             05/15/2020   Travel                                 

 

             2020   Infusion     Oncology     Matt Mcgregor  GE junction car

bob Garcia MD   (HCC) (Primary 

Dx)

 

             2020   Oncology     Oncology     Zoe Jovel RN 

 

             2020   Travel                                 

 

             2020   Orders Only  Pharmacy     Khadijah Tejada [facility] 

 

             2020   Telephone    Oncology     Matt Mcgregor MD   

 

             2020   Travel                                 

 

             2020   Telephone    Oncology     Jazlyn Wood junction car

bob Smith RN     (HCC) (Primary 

Dx)

 

             2020   Orders Only  Oncology     Jazlyn Wood RN     

 

             2020   Nurse Only   Oncology     Matt Mcgregor  GE junction car

bob Garcia MD   (HCC) (Primary 

Dx)

 

             2020   Hospital Encounter Procedural Cardiology Matt Mcgregor  

Encounter for



                                                    MD Jose   antineoplastic



                                                                 chemotherapy

 

             2020   Travel                                 

 

             2020   Orders Only  Oncology     [place]      GE brenda car

bob (HCC) (Primary 

Dx);



                                                    KEE Lenz   Liver metastase

s (HCC)

 

             2020   Telephone    Oncology     Matt Mcgregor MD   

 

             2020   Telephone    Oncology     Jazlyn Wood RN     

 

             2020   Travel                                 

 

             2020   Documentation Oncology     Cara Jovel RN 

 

             2020   Documentation Oncology     Cara Jovel RN 

 

             2020   Office Visit Oncology     Matt Mcgregor  Gastrointestina

l hemorrhage 

associated with gastric ulcer (Primary Dx);



                                                    MD Jose   GE junction car

sophiaoma (HCC);



                                                                 Liver metastase

s (HCC);



                                                                 Hypotension due

 to drugs;



                                                                 Esophageal dysp

hagia

 

             2020   Infusion     Oncology     Matt Mcgregor  GE junction car

bob Garcia MD   (HCC) (Primary 

Dx)

 

             2020   Oncology     Oncology     Zoe Jovel RN 

 

             2020   Orders Only  Oncology     [place],      Encounter for



                                                    KEE Lenz   antineoplastic



                                                                 chemotherapy (P

rimary



                                                                 Dx)

 

             2020   Telephone    Oncology     Matt Mcgregor MD   

 

             2020   Telephone    Oncology     Cara Jovel, DANIEL 

 

             2020   Orders Only  Pharmacy     Khadijah Tejada, [facility] 

 

             2020   Travel                                 

 

             2020   Documentation Oncology     Cara Jovel, DANIEL 

 

             2020   Telephone    Oncology     Matt Mcgregor MD   

 

             2020   Telephone Consult Oncology     Matt Mcgregor  GE junctio

n carcinoma (HCC) 

(Primary Dx);



                                                    MD Jose   Esophageal dysp

hagia;



                                                                 Liver metastase

s (HCC)

 

             2020   Orders Only  Oncology     [place],      Encounter for



                                                    KEE Lenz   antineoplastic



                                                                 chemotherapy (P

rimary



                                                                 Dx)

 

             2020   Telephone    Oncology     Cara Jovel, DANIEL 

 

             2020   Orders Only  Oncology     Martita Ji, [facility] 

 

             2020   Orders Only  Oncology     Jazlyn Wood RN     

 

             2020   Telephone    Oncology     Deya Gordillo RD           

 

             2020   Telephone    Oncology     Jazlyn Wood RN     

 

             04/15/2020   Telephone    Oncology     Cara Jovel RN 

 

             2020   Telephone    Oncology     Yoanna Griggs MA   

 

             2020   Telephone    Oncology     Matt Mcgregor MD   

 

             2020   Travel                                 

 

             2020   Documentation Oncology     Uche Santos RN  

 

             2020   Telephone    Oncology     Cara Jovel RN 

 

             2020   Orders Only  Oncology     [place],      Encounter for a

ntineoplastic 

chemotherapy (Primary Dx);



                                                    KEE Lenz   GE junction car

cinoma (HCC)

 

             2020   Orders Only  Oncology     Matt Mcgregor MD   

 

             2020   Orders Only  Oncology     aMrtita Ji, ESPERANZA 

 

             2020   Orders Only  Oncology     Jazlyn Wood RN     

 

             2020   Telephone    Oncology     Matt Mcgregor MD   

 

             2020   Telephone    Oncology     Cara Jovel, DANIEL 

 

             2020   Telephone    Oncology     Deya Gordillo RD           

 

             2020   Orders Only  Oncology     Jazlyn Wood RN     

 

             2020   Telephone    Oncology     Jazlyn Wood RN     

 

             2020   Telephone    Oncology     Deya Gordillo, 



                                                    MIKEY           

 

             2020   Telephone    Oncology     Cara Jovel, RN 

 

             2020   Telephone    Oncology     Cara Jovel, RN 

 

             2020   Nurse Only   Oncology     Matt Mcgregor  GE junction ginny Garcia MD   (HCC) (Primary 

Dx)

 

             2020   Travel                                 

 

             2020   Telephone    Oncology     Cara Jovel, RN 

 

             2020   Social Work  Oncology     Cara Rendon, [facility] 

 

             2020   Infusion     Oncology     Matt Mcgregor  GE junction ginny Garcia MD   (HCC) (Primary 

Dx)

 

             2020   Documentation Oncology     Martita iJ [facility] 

 

             2020   Telephone    Oncology     Matt Mcgregor MD   

 

             2020   Telephone    Oncology     Matt Mcgregor MD   

 

             2020   Telephone    Oncology     Matt Mcgregor MD   

 

             2020   Travel                                 

 

             2020   Hospital Encounter Radiology    Matt Mcgregor  Malignant

 neoplasm of 

esophagus, unspecified location (HCC);



                                                    MD Jose   Pre-op testing

 

             2020   Telephone    Oncology     Matt Mcgregor MD   

 

             2020   Telephone    Oncology     Jazlyn Wood RN     

 

             2020   Telephone    Oncology     Matt Mcgregor MD   

 

             2020   Telephone    Oncology     Matt Mcgregor MD   

 

             2020   Travel                                 

 

             2020   Telephone    Oncology     Matt Mcgregor MD   

 

             2020   Orders Only  Oncology     Matt Mcgregor  GE brenda Garcia MD   (HCC) (Primary 

Dx)

 

             2020   Telephone    Radiology    Ashlee Shields, DANIEL    

 

             2020   Oncology     Oncology     Zoe Jovel RN 

 

             2020   Documentation Medical Records Provider,    



                                                    Unknown      

 

             2020   Orders Only  Oncology     Martita Ji [facility] 

 

             2020   Orders Only  Oncology     [place],     GE junction car

bob Anders RPH (HCC) (Primary 

Dx)

 

             2020   Hospital Encounter Radiology    Matt Mcgregor MD   

 

             2020   Hospital Encounter Radiology    Matt Mcgregor MD   

 

             2020   Hospital Encounter Radiology    Matt Mcgregor  Malignant

 neoplasm of



                                                    MD Jose   esophagus,



                                                                 unspecified loc

ation



                                                                 (HCC)

 

             2020   Lab          Lab          Matt Mcgregor  Malignant neopl

asm of esophagus, unspecified 

location (HCC);



                                                    MD Jose   Pre-op testing

 

             2020   Consult      Oncology     Matt Mcgregor  Liver metastase

s (HCC) (Primary Dx);



                                                    MD Jose   GE junction car

cinoma (HCC)

 

             2020   Orders Only  Oncology     [place],      Malignant neopl

asm of



                                                    KEE Lenz   esophagus,



                                                                 unspecified loc

ation



                                                                 (HCC) (Primary 

Dx)

 

             2020   Orders Only  Oncology     [place],      Malignant neopl

asm of esophagus, 

unspecified location (HCC) (Primary Dx);



                                                    KEE Lenz   Pre-op testing

 

             2020   Travel                                 

 

             2020   Telephone    Oncology     Matt Mcgregor MD   

 

             2020   Office Visit General Surgery [place],   Malignant ne

oplasm of



                                                    Patrick BUTTERFIELD MD  esophagus,



                                                                 unspecified loc

ation



                                                                 (HCC) (Primary 

Dx)

 

             2020   Travel                                 

 

             2020   Travel                                 



after 2019



Family History







                Medical History Relation        Name            Comments

 

                Heart attack    Father                          

 

                Breast cancer   Mother                          









                Relation        Name            Status          Comments

 

                Father                                          

 

                Mother                                          







Social History







             Tobacco Use  Types        Packs/Day    Years Used   Date

 

             Former Smoker Cigars       1                         Quit: 2020









                Smokeless Tobacco: Never Used                                 









                Alcohol Use     Drinks/Week     oz/Week         Comments

 

                Never                                           









                    Alcohol Habits      Answer              Date Recorded

 

                    How often do you have a drink containing alcohol? Never     

          2020

 

                    How many drinks containing alcohol do you have on a typical 

Not asked           



                    day when you are drinking?                     

 

                    How often do you have six or more drinks on one occasion? No

t asked           









                          Sex Assigned at Birth     Date Recorded

 

                          Not on file               









                    Job Start Date      Occupation          Industry

 

                    Not on file         Not on file         Not on file









                    Travel History      Travel Start        Travel End









                                        No recent travel history available.









                    COVID-19 Exposure   Response            Date Recorded

 

                    In the last month, have you been in contact with No / Unsure

         2020  3:21 PM 

CDT



                    someone who was confirmed or suspected to have              

       



                    Coronavirus / COVID-19?                     







Last Filed Vital Signs







                Vital Sign      Reading         Time Taken      Comments

 

                Blood Pressure  109/66          2020  3:40 PM 



                                                CDT             

 

                Pulse           58              2020  3:40 PM 



                                                CDT             

 

                Temperature     36.6 C (97.8 F) 2020  3:40 PM 



                                                CDT             

 

                Respiratory Rate 18              2020  3:40 PM 



                                                CDT             

 

                Oxygen Saturation 99%             2020  3:40 PM 



                                                CDT             

 

                Inhaled Oxygen Concentration -               -               

 

                Weight          66.6 kg (146 lb 11.8 oz) 2020  3:40 PM 



                                                CDT             

 

                Height          185.4 cm (6' 1") 2020  3:40 PM 



                                                CDT             

 

                Body Mass Index 19.36           2020  3:40 PM 



                                                CDT             







Plan of Treatment







             Date         Type         Specialty    Care Team    Description

 

             2020   Infusion     Oncology     Matt Mcgregor MD



                                        



                                                                [address] [address]

0



                                        



                                                                [phone] (Fax) 

 

             07/15/2020   Nurse Only   Oncology     Matt Mcgregor MD



                                        



                                                                [address] [place] [address]

0



                                        



                                                                [phone] (Fax) 

 

             2020   Lab          Lab          Kacie Cartwright MD



                                        



                                                                [address]



                                        



                                                    [place]  



                                                                [id number] [phone] (Fax) 

 

             2020   Hospital Encounter Gastroenterology Kacie Cartwright MD



                                        



                                                                [address]



                                        



                                                    [place]  



                                                                [id number] [phone] (Fax) 

 

             2020   Surgery      Gastroenterology Kacie Cartwright, EGD W/ 

STENT REMOVAL



                                                                MD WADE/ LACHO



                                                                [address]



                                        



                                                    [place]  



                                                                [address] [phone] (Fax) 

 

             2020   Infusion     Oncology     Matt Mcgregor MD



                                        



                                                                [address] [address]

0



                                        



                                                                [phone] (Fax) 

 

             2020   Office Visit Oncology     Matt Mcgregor MD



                                        



                                                                [address] [place] [address]

0



                                        



                                                                [phone] (Fax) 

 

             2020   Nurse Only   Oncology     Matt Mcgregor MD



                                        



                                                                [address] [place] [address]

0



                                        



                                                                [phone] (Fax) 

 

             08/10/2020   Infusion     Oncology     Matt Mcgregor MD



                                        



                                                                [address]



                                        



                                                                [address]



                                        



                                                                [place], [place] [address]

0



                                        



                                                                [phone] (Fax) 

 

             2020   Nurse Only   Oncology     Matt Mcgregor MD



                                        



                                                                [address]



                                        



                                                                [address]



                                        



                                                                [place], [place] [address]

0



                                        



                                                                [phone] (Fax) 

 

             2020   Infusion     Oncology     Matt Mcgregor MD



                                        



                                                                [address]



                                        



                                                                [address]



                                        



                                                                [place], [place] [address]

0



                                        



                                                                [phone] (Fax) 

 

             2020   Nurse Only   Oncology     Matt Mcgregor MD



                                        



                                                                [address]



                                        



                                                                [address] [place] [address]

0



                                        



                                                                [phone] (Fax) 

 

             2020   Office Visit Cardiology   Brijesh Robbins MD



                                        



                                                                [address] [facility]



                                        



                                                                [address]



                                        



                                                                [place], [place] [address]

0



                                        



                                                                [phone] (Fax) 









                Health Maintenance Due Date        Last Done       Comments

 

                COLONOSCOPY SCREENING 1998                      

 

                SHINGLES VACCINES (#1) 1998                      

 

                65+ PNEUMOCOCCAL VACCINE (1 of 2 - PCV13) 2013            

          

 

                INFLUENZA VACCINE 2020                      







Implants







          Implanted Type      Area       Device    Shelf     Model /



                                                  Identifier Expiration Serial /



                                                            Date      Lot

 

                          Port Imlpntbl Smart Port W/ Dtchd 0.4ml 6.6fr 55cm W/ 

Sheath - [id number] 

Implantable  N/A:         [employer]              2022   [id number]

1 HOUS /



                Implanted: 2020 at [facility] (Quantity not on file) Inf

usion Ports  N/A             

INC                                                          /



                    or Accessories                                         [id number]

21

 

          Stent     Stent                                             









                                        Description:digestive









                Stent Espgl Wallflex 10cm 23mm Flcvrd - [id number] Surgical Sten

ts N/A: N/A        [facility] 

ENDOSCOPY                               2021          [id number] /



          Implanted: 06/10/2020 at [facility] (Quantity not on file)          

                                          /



                                                                      [id number]







Procedures







             Procedure Name Priority     Date/Time    Associated   Comments



                                                    Diagnosis    

 

             ESTIMATED GFR STAT         2020                Results for



                                       10:04 AM CDT              this



                                                                 procedure are



                                                                 in the



                                                                 results



                                                                 section.

 

             MAGNESIUM LEVEL STAT         2020   [facility]  Results for



                                       10:04 AM CDT carcinoma (HCC) this



                                                                 procedure are



                                                                 in the



                                                                 results



                                                                 section.

 

             HC COMPLETE BLD COUNT W/AUTO STAT         2020   [facility] 

 Results for



             DIFF                      10:04 AM CDT carcinoma (HCC) this



                                                                 procedure are



                                                                 in the



                                                                 results



                                                                 section.

 

             COMPREHENSIVE METABOLIC PANEL STAT         2020   [facility]

  Results for



                                       10:04 AM CDT carcinoma (HCC) this



                                                                 procedure are



                                                                 in the



                                                                 results



                                                                 section.

 

             ESTIMATED GFR STAT         06/15/2020                Results for



                                       9:15 AM CDT               this



                                                                 procedure are



                                                                 in the



                                                                 results



                                                                 section.

 

             MAGNESIUM LEVEL STAT         06/15/2020   [facility]  Results for



                                       9:15 AM CDT  carcinoma (HCC) this



                                                                 procedure are



                                                                 in the



                                                                 results



                                                                 section.

 

             HC COMPLETE BLD COUNT W/AUTO STAT         06/15/2020   [facility] 

 Results for



             DIFF                      9:15 AM CDT  carcinoma (HCC) this



                                                                 procedure are



                                                                 in the



                                                                 results



                                                                 section.

 

             COMPREHENSIVE METABOLIC PANEL STAT         06/15/2020   [facility]

  Results for



                                       9:15 AM CDT  carcinoma (HCC) this



                                                                 procedure are



                                                                 in the



                                                                 results



                                                                 section.

 

             FL < 1 HOUR  Routine      06/10/2020   Abnormal findings Results fo

r



                                       8:47 AM CDT  on diagnostic this



                                                    imaging of other procedure a

re



                                                    parts of digestive in the



                                                    tract        results



                                                                 section.

 

             ESOPHAGOGASTRODUODENOSCOPY              06/10/2020   Abnormal findi

ngs 



             (EGD)                     7:52 AM CDT  on diagnostic 



                                                    imaging of other 



                                                    parts of digestive 



                                                                tract



                                        



                                                    Dysphagia,   



                                                    unspecified  

 

             CT CHEST W CONTRAST ABDOMEN W Routine      2020   [facility]

  Results for



                CONTRAST PELVIS W CONTRAST                 10:33 AM CDT    carci

noma (HCC)



                                        this



                                                    Liver metastases procedure a

re



                                                    (HCC)        in the



                                                                 results



                                                                 section.

 

             ESTIMATED GFR STAT         2020                Results for



                                       9:59 AM CDT               this



                                                                 procedure are



                                                                 in the



                                                                 results



                                                                 section.

 

             MAGNESIUM LEVEL STAT         2020   [facility]  Results for



                                       9:59 AM CDT  carcinoma (HCC) this



                                                                 procedure are



                                                                 in the



                                                                 results



                                                                 section.

 

             HC COMPLETE BLD COUNT W/AUTO STAT         2020   [facility] 

 Results for



             DIFF                      9:59 AM CDT  carcinoma (HCC) this



                                                                 procedure are



                                                                 in the



                                                                 results



                                                                 section.

 

             COMPREHENSIVE METABOLIC PANEL STAT         2020   [facility]

  Results for



                                       9:59 AM CDT  carcinoma (HCC) this



                                                                 procedure are



                                                                 in the



                                                                 results



                                                                 section.

 

             ECG 12-LEAD  Routine      05/15/2020                

 

             ESTIMATED GFR STAT         2020                Results for



                                       9:23 AM CDT               this



                                                                 procedure are



                                                                 in the



                                                                 results



                                                                 section.

 

             MAGNESIUM LEVEL STAT         2020   [facility]  Results for



                                       9:23 AM CDT  carcinoma (HCC) this



                                                                 procedure are



                                                                 in the



                                                                 results



                                                                 section.

 

             HC COMPLETE BLD COUNT W/AUTO STAT         2020   [facility] 

 Results for



             DIFF                      9:23 AM CDT  carcinoma (HCC) this



                                                                 procedure are



                                                                 in the



                                                                 results



                                                                 section.

 

             COMPREHENSIVE METABOLIC PANEL STAT         2020   [facility]

  Results for



                                       9:23 AM CDT  carcinoma (HCC) this



                                                                 procedure are



                                                                 in the



                                                                 results



                                                                 section.

 

             TTE COMPLETE, WO CONTRAST, W Routine      2020   Encounter fo

r Results for



             DOPPLER ([id number])              11:08 AM CDT antineoplastic this



                                                    chemotherapy procedure are



                                                                 in the



                                                                 results



                                                                 section.

 

             FOLATE LEVEL STAT         2020   [facility]  Results for



                                       9:43 AM CDT  carcinoma (HCC) this



                                                                 procedure are



                                                                 in the



                                                                 results



                                                                 section.

 

             VITAMIN B12 LEVEL STAT         2020   [facility]  Results fo

r



                                       9:43 AM CDT  carcinoma (HCC) this



                                                                 procedure are



                                                                 in the



                                                                 results



                                                                 section.

 

             ESTIMATED GFR STAT         2020                Results for



                                       9:42 AM CDT               this



                                                                 procedure are



                                                                 in the



                                                                 results



                                                                 section.

 

             TOTAL IRON BINDING CAPACITY STAT         2020   [facility]  

Results for



                                       9:42 AM CDT  carcinoma (HCC) this



                                                                 procedure are



                                                                 in the



                                                                 results



                                                                 section.

 

             FERRITIN LEVEL STAT         2020   [facility]  Results for



                                       9:42 AM CDT  carcinoma (HCC) this



                                                                 procedure are



                                                                 in the



                                                                 results



                                                                 section.

 

             MAGNESIUM LEVEL STAT         2020   [facility]  Results for



                                       9:42 AM CDT  carcinoma (HCC) this



                                                                 procedure are



                                                                 in the



                                                                 results



                                                                 section.

 

             HC COMPLETE BLD COUNT W/AUTO STAT         2020   [facility] 

 Results for



             DIFF                      9:42 AM CDT  carcinoma (HCC) this



                                                                 procedure are



                                                                 in the



                                                                 results



                                                                 section.

 

             COMPREHENSIVE METABOLIC PANEL STAT         2020   [facility]

  Results for



                                       9:42 AM CDT  carcinoma (HCC) this



                                                                 procedure are



                                                                 in the



                                                                 results



                                                                 section.

 

             ESTIMATED GFR STAT         2020                Results for



                                       9:25 AM CDT               this



                                                                 procedure are



                                                                 in the



                                                                 results



                                                                 section.

 

             MAGNESIUM LEVEL STAT         2020   [facility]  Results for



                                       9:25 AM CDT  carcinoma (HCC) this



                                                                 procedure are



                                                                 in the



                                                                 results



                                                                 section.

 

             HC COMPLETE BLD COUNT W/AUTO STAT         2020   [facility] 

 Results for



             DIFF                      9:25 AM CDT  carcinoma (HCC) this



                                                                 procedure are



                                                                 in the



                                                                 results



                                                                 section.

 

             COMPREHENSIVE METABOLIC PANEL STAT         2020   [facility]

  Results for



                                       9:25 AM CDT  carcinoma (HCC) this



                                                                 procedure are



                                                                 in the



                                                                 results



                                                                 section.

 

             IR PORT PLACEMENT Routine      2020   Malignant neoplasm Resu

lts for



                                       11:36 AM CDT of esophagus, this



                                                    unspecified  procedure are



                                                                location (HCC)



                                        in the



                                                    Pre-op testing results



                                                                 section.

 

             XR CHEST 2 VW Routine      2020   Malignant neoplasm Results 

for



                                       12:36 PM CDT of esophagus, this



                                                    unspecified  procedure are



                                                    location (HCC) in the



                                                                 results



                                                                 section.

 

             ESTIMATED GFR Routine      2020                Results for



                                       12:08 PM CDT              this



                                                                 procedure are



                                                                 in the



                                                                 results



                                                                 section.

 

             PARTIAL THROMBOPLASTIN TIME Routine      2020   Malignant anna

plasm Results for



             (PTT)                     12:08 PM CDT of esophagus, this



                                                    unspecified  procedure are



                                                                location (HCC)



                                        in the



                                                    Pre-op testing results



                                                                 section.

 

             PROTHROMBIN TIME WITH INR Routine      2020   Malignant neopl

asm Results for



                                       12:08 PM CDT of esophagus, this



                                                    unspecified  procedure are



                                                                location (HCC)



                                        in the



                                                    Pre-op testing results



                                                                 section.

 

             CANCER ANTIGEN 19-9 Routine      2020   Malignant neoplasm Re

sults for



                                       12:08 PM CDT of esophagus, this



                                                    unspecified  procedure are



                                                    location (HCC) in the



                                                                 results



                                                                 section.

 

             CARCINOEMBRYONIC ANTIGEN (CEA) Routine      2020   Malignant 

neoplasm Results for



                                       12:08 PM CDT of esophagus, this



                                                    unspecified  procedure are



                                                    location (HCC) in the



                                                                 results



                                                                 section.

 

             COMPREHENSIVE METABOLIC PANEL Routine      2020   Malignant n

eoplasm Results for



                                       12:08 PM CDT of esophagus, this



                                                    unspecified  procedure are



                                                    location (HCC) in the



                                                                 results



                                                                 section.

 

             HC COMPLETE BLD COUNT W/AUTO Routine      2020   Malignant ne

oplasm Results for



             DIFF                      12:08 PM CDT of esophagus, this



                                                    unspecified  procedure are



                                                    location (HCC) in the



                                                                 results



                                                                 section.

 

             CT ABD/PELVIC EXTERNAL STUDY Routine      2020               

 Results for



                                       8:38 AM CST               this



                                                                 procedure are



                                                                 in the



                                                                 results



                                                                 section.

 

             XR CHEST EXTERNAL STUDY Routine      2020                Resu

lts for



                                       8:11 AM CST               this



                                                                 procedure are



                                                                 in the



                                                                 results



                                                                 section.



after 2019



Results

Estimated GFR (2020 10:04 AM CDT)Only the most recent of7 resultswithin 
the time period is included.





             Component    Value        Ref Range    Performed At Pathologist



                                                                 Signature

 

             Estimated GFR 86           mL/min/1.73  [facility] 



                          Comment:     m2           HOSPITAL     



                          Catergory Units  Interpretation                 

          



                          G1     >=90   Normal or high              

             



                          G2     60-89  Mildly decreased            

               



                          G3a    45-59  Mildly to moderately decreas

ed                           



                          G3b    30-44  Moderately to severely decre

ased                           



                          G4     15-29  Severely decreased          

                 



                          G5     <15   Kidney failure             

              



                          The eGFR was calculated using the Chronic Kidney Disea

se                           



                          Epidemiology Collaboration (CKD-EPI) equation.        

                   



                          Interpretation is based on recommendations of the     

                      



                          National Kidney Foundation-Kidney Disease Outcomes [place]za

lity                           



                          Initiative (NKF-KDOQI) published in 2014.             

              



                                                                 









                                        Specimen

 

                                        









                Performing Organization Address         City/State/Zipcode Phone

 Number

 

                [facility] DEPARTMENT OF PATHOLOGY AND [address] [place], [place] [address]

0 



                GENOMIC MEDICINE                                 

 

                [facility] [address] [place] [address] 



CBC with platelet and differential (2020 10:04 AM CDT)Only the most recent
of7 resultswithin the time period is included.





             Component    Value        Ref Range    Performed At Pathologist



                                                                 Signature

 

             WBC          3.57 (L)     4.50 - 11.00 [facility] 



                                       k/uL         HOSPITAL     

 

             RBC          3.70 (L)     4.40 - 6.00  [facility] 



                                       m/[facility]     

 

             HGB          11.1 (L)     14.0 - 18.0  [facility] 



                                       g/dL         [facility]     

 

             HCT          33.9 (L)     41.0 - 51.0 % [facility]     

 

             MCV          91.6         82.0 - 100.0 [facility]     

 

             MCH          30.0         27.0 - 34.0 pg [facility]     

 

             MCHC         32.7         31.0 - 37.0  [facility]     

 

             RDW - SD     63.6 (H)     37.0 - 55.0 fL [facility]     

 

             MPV          12.0         8.8 - 13.2 fL [facility]     

 

             Platelet count 79 (L)       150 - 400 k/uL [facility]     

 

             Nucleated RBC 0.00         /100 WBC     [facility]     

 

             Neutrophils  56.1         39.0 - 69.0 % [facility]     

 

             Lymphocytes  23.2 (L)     25.0 - 45.0 % [facility]     

 

             Monocytes    19.0 (H)     0.0 - 10.0 % [facility]     

 

             Eosinophils  0.8          0.0 - 5.0 %  [facility]     

 

             Basophils    0.6          0.0 - 1.0 %  [facility]     

 

             Immature granulocytes 0.3Comment:  0.0 - 1.0 %  [facility] 



                          "Immature                 HOSPITAL     



                          granulocytes"                           



                          (promyelocytes                           



                          , myelocytes,                           



                          metamyelocytes                           



                          )                                      









                                        Specimen

 

                                        Blood









                Performing Organization Address         City/[facility]/Zipcode Phone

 Number

 

                [facility] DEPARTMENT OF PATHOLOGY AND [address] [place], [place] [address]

0 



                [facility] [address] [place] [address] 



Magnesium level (2020 10:04 AM CDT)Only the most recent of6 resultswithin 
the time period is included.





             Component    Value        Ref Range    Performed At Pathologist Sig

nature

 

             Magnesium    1.8          1.6 - 2.4 mg/dL [facility]

L 









                                        Specimen

 

                                        Blood









                Performing Organization Address         City/State/Zipcode Phone

 Number

 

                [facility] DEPARTMENT OF PATHOLOGY AND [address] [place], [place] [address]

0 



                [facility] [address] [place] [address] 



Comprehensive metabolic panel (2020 10:04 AM CDT)Only the most recent of7 
resultswithin the time period is included.





             Component    Value        Ref Range    Performed At Pathologist



                                                                 Signature

 

             Sodium       137          135 - 148    [facility] 



                                       mEq/L        [facility]     

 

             Potassium    3.6          3.5 - 5.0    [facility] 



                                       mEq/L        [facility]     

 

             Chloride     101          98 - 112     [facility] 



                                       mEq/L        [facility]     

 

             CO2          22 (L)       24 - 31 mEq/L [facility]     

 

             Anion gap    14@ANIO      7 - 15 mEq/L [facility]     

 

             BUN          12           8 - 23 mg/dL [facility]     

 

             Creatinine   0.89         0.70 - 1.20  [facility] 



                                       mg/dL        [facility]     

 

             Glucose      113 (H)      65 - 99 mg/dL [facility]     

 

             Calcium      9.3          8.8 - 10.2   [facility] 



                                       mg/dL        [facility]     

 

             Protein      6.3          6.3 - 8.3    [facility] 



                          Comment:     g/dL         HOSPITAL     



                          -                                      



                          [place]         4.6-7.0 g/dL        

                   



                          1 week          4.4-7.6 g/dL        

                   



                          7 months-1year      5.1-7.3 g/dL            

               



                          1-2 years        5.6-7.5 g/dL         

                  



                          >3 years         6.0-8.0 g/dL         

                  



                                    6.3-8.3 g/dL        

                   



                                                                 

 

             Albumin      2.8 (L)      3.5 - 5.0    [facility] 



                                       g/dL         [facility]     

 

             A/G ratio    0.8          0.7 - 3.8    [facility]     

 

             Alkaline phosphatase 98           40 - 129 U/L [facility]     

 

             AST          30           10 - 50 U/L  [facility]     

 

             ALT          23           5 - 50 U/L   [facility]     

 

             Total bilirubin 0.6          0.0 - 1.2    [facility] 



                                       mg/dL        HOSPITAL     









                                        Specimen

 

                                        Blood









                Performing Organization Address         City/State/Zipcode Phone

 Number

 

                [facility] DEPARTMENT OF PATHOLOGY AND [address] [place], [place] [address]

0 



                GENOMIC MEDICINE                                 

 

                [facility] [address] [place] [address] 



[place] &lt; 1 Hour (06/10/2020  8:47 AM CDT)





                                        Specimen

 

                                        









                          Narrative                 Performed At

 

                                        EXAMINATION: [place] < 1 HOUR



                                         [facility]



                                         



                                        



                                         



                                        



                                        LOCATION: [place] [address] ENDOSCOPY ROOM 8



                                        



                                        PROCEDURE: EGD w/STENT PLACEMENT



                                        



                                        SCHEDULED TIME: 0800



                                        



                                        START TIME: 0747



                                        



                                        END TIME: 0847



                                        



                                        FLUORO TIME: 2.2 MINS



                                        



                                        DOSE (mGy): 22 mGy



                                        



                                        # OF IMAGES: 8



                                        



                                         



                                        



                                         



                                        



                                        IMPRESSION:



                                        



                                         



                                        



                                        Fluoroscopy was requested in the [facility]

py Suite. 



                                        



                                         



                                        



                          Separate endoscopy report will be issued by the physic

rosa performing the 



                          procedure.                









                                        Procedure Note

 

                                         Interface, Radiology Results Incoming

 - 06/10/2020 10:20 AM CDT



EXAMINATION:  [place] < 1 HOUR



                                        



                                        



                                        LOCATION:  [place] [address] ENDOSCOPY ROOM 8



                                        PROCEDURE: EGD w/STENT PLACEMENT



                                        SCHEDULED TIME: 0800



                                        START TIME:  47



                                        END TIME:  0847



                                        FLUORO TIME:  2.2 MINS



                                        DOSE (mGy):  22 mGy



                                        # OF IMAGES:  8



                                        



                                        



                                        IMPRESSION:



                                        



                                        Fluoroscopy was requested in the [facility]

py Suite.



                                        



                                        Separate endoscopy report will be issued

 by the physician performing the 

procedure.









                Performing Organization Address         City[facility] Phone

 Number

 

                 [facility]      [address] [place] [address] 



CT Chest W Contrast Abdomen W Contrast Pelvis W Contrast (2020 10:33 AM 
CDT)





                                        Specimen

 

                                        









                          Narrative                 Performed At

 

                                        EXAMINATION: CT CHEST W CONTRAST ABDOM

EN W CONTRAST PELVIS W CONTRAST



                                         [facility]



                                         



                                        



                          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

lasm of cardia, 



                          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

epatic bile duct, 



                                        GE adenocarcinoma



                                        



                                         



                                        



                          TECHNIQUE: Multiple axial images of the chest, abdom

en, and pelvis 



                          were obtained following intravenous administration of 

iodinated 



                          contrast. Sagittal and coronal computerized reformatte

d images were 



                                        obtained. CT imaging was performed with 

iterative 



                                        



                          reconstruction techniques and/or automated exposure co

ntrol to reduce 



                                        radiation dose. 



                                        



                                         



                                        



                                        COMPARISON: To a previous outside exam

ination from 3/5/2020



                                        



                                         



                                        



                                        IMPRESSION:



                                        



                                         



                                        



                                        CHEST:



                                        



                                         



                                        



                          Lungs and airways: Patchy peripheral areas of groundgl

ass attenuation 



                          are present in the posterior segment of the right uppe

r lobe. Findings 



                          are nonspecific but are concerning for aspiration pneu

monia. The left 



                                        lung is clear. 



                                        



                                        Pleura: No pleural effusion or pneumotho

rax.



                                        



                                        Mediastinum and lymph nodes: No lymphade

nopathy. 



                                        



                          Cardiovascular: The heart size is normal. No pericardi

al effusion. The 



                                        thoracic aorta is normal in caliber. No 

dissection. 



                                        



                          Other: A metallic stent is present in the distal esoph

andreina extending 



                                        into the stomach. The stomach is distend

ed.



                                        



                                         



                                        



                                        ABDOMEN:



                                        



                                         



                                        



                          Liver: Extensive metastatic disease is present, most m

arked involving 



                          hepatic segments 7 and 8, without definite interval ch

belkis from the 



                                        March examination. Metastatic nodules me

asure 5 to 20 mm.



                                        



                          Gallbladder/Biliary: The gallbladder is normal. There 

is no evidence of 



                                        intra or extrahepatic biliary ductal dil

atation.



                                        



                                        Spleen: The spleen is not enlarged.



                                        



                                        Pancreas: The pancreas is small.



                                        



                                        Adrenal Glands: The adrenal glands are u

nremarkable.



                                        



                          Kidneys: The kidneys are unremarkable. No mass, hydron

ephrosis or 



                          calculi. Subcentimeter cysts are present in both kidne

ys not of clinical 



                                        significance.



                                        



                                        Vascular: The abdominal aorta is nonaneu

rysmal.



                                        



                                        Nodes: No enlarged retroperitoneal or me

senteric lymphadenopathy.



                                        



                          Bowel: No bowel obstruction or inflammatory changes. T

he appendix is 



                                        normal in appearance. Moderate amount of

 feces is noted in the colon.



                                        



                                        Ascites/fluid collections: No ascites or

 fluid collections.



                                        



                                         



                                        



                                        PELVIS:



                                        



                                        No mass, fluid collection or significant

 adenopathy. 



                                        



                                         



                                        



                                        MUSCULOSKELETAL: 



                                        



                                        No suspicious osseous lesions. 



                                        



                                         



                                        



                                        SUMMARY:



                                        



                                        1.Metastatic disease in the liver.



                                        



                                         



                                        



                                        2.Placement of a metallic stent across t

he GE junction.



                                        



                                         



                                        



                                         



                                        



                                        [id number]



                                        



                                         



                                        



                                         



                                        



                                         



                                        



                                         



                                        



                                                    









                                        Procedure Note

 

                                        Hm Interface, Radiology Results Incoming

 - 2020 11:25 AM CDT



EXAMINATION:  CT CHEST W CONTRAST ABDOMEN W CONTRAST PELVIS W CONTRAST



                                        



                                        CLINICAL HISTORY: 69 years Male  C16.0 M

alignant neoplasm of cardia, C78.7 

Secondary malignant neoplasm of liver and intrahepatic bile duct, GE 
adenocarcinoma



                                        



                                        TECHNIQUE:  Multiple axial images of the

 chest, abdomen, and pelvis were 

obtained following intravenous administration of iodinated contrast. Sagittal 
and coronal computerized reformatted images were obtained. CT imaging was 
performed with iterative



                                        reconstruction techniques and/or automat

ed exposure control to reduce radiation 

dose.



                                        



                                        COMPARISON:  To a previous outside exami

nation from 3/5/2020



                                        



                                        IMPRESSION:



                                        



                                        CHEST:



                                        



                                        Lungs and airways: Patchy peripheral are

as of groundglass attenuation are 

present in the posterior segment of the right upper lobe. Findings are 
nonspecific but are concerning for aspiration pneumonia. The left lung is clear.



                                        Pleura: No pleural effusion or pneumotho

rax.



                                        Mediastinum and lymph nodes: No lymphade

nopathy.



                                        Cardiovascular: The heart size is normal

. No pericardial effusion. The thoracic 

aorta is normal in caliber. No dissection.



                                        Other: A metallic stent is present in th

e distal esophagus extending into the 

stomach. The stomach is distended.



                                        



                                        ABDOMEN:



                                        



                                        Liver: Extensive metastatic disease is p

resent, most marked involving hepatic 

segments 7 and 8, without definite interval change from the March examination. 
Metastatic nodules measure 5 to 20 mm.



                                        Gallbladder/Biliary: The gallbladder is 

normal. There is no evidence of intra or

extrahepatic biliary ductal dilatation.



                                        Spleen: The spleen is not enlarged.



                                        Pancreas: The pancreas is small.



                                        Adrenal Glands: The adrenal glands are u

nremarkable.



                                        Kidneys: The kidneys are unremarkable. N

o mass, hydronephrosis or calculi. 

Subcentimeter cysts are present in both kidneys not of clinical significance.



                                        Vascular: The abdominal aorta is nonaneu

rysmal.



                                        Nodes: No enlarged retroperitoneal or me

senteric lymphadenopathy.



                                        Bowel: No bowel obstruction or inflammat

ory changes. The appendix is normal in 

appearance. Moderate amount of feces is noted in the colon.



                                        Ascites/fluid collections: No ascites or

 fluid collections.



                                        



                                        PELVIS:



                                        No mass, fluid collection or significant

 adenopathy.



                                        



                                        MUSCULOSKELETAL:



                                        No suspicious osseous lesions.



                                        



                                        SUMMARY:



                                        1.Metastatic disease in the liver.



                                        



                                        2.Placement of a metallic stent across t

he GE junction.



                                        



                                        



                                        [id number]









                Performing Organization Address         City/State/Zipcode Phone

 Number

 

                [facility]      [address] [place] [address] 



ECG 12 lead (05/15/2020)





                          Narrative                 Performed At

 

                                        This result has an attachment that is no

t available.



                                        



Transthoracic Echocardiogram Complete, (w Contrast, Strain and 3D if needed) 
(2020 11:08 AM CDT)





                                        Specimen

 

                                        









                          Narrative                 Performed At

 

                                        This result has an attachment that is no

t available.



                   [facility]



                                      Echocardiography R

eport 



                                [address].Name: MEGAN AVERY.ID:  [id number]       

                                      



                                        .Date:  2020       

 Refer.MD: MATT MCGREGOR MD   

                                      



                                        Exam Time: 9:07:00 AM       

Study Type:Routine Echo     

                                      



                                        Height:  73in        

  Weight:  159lb     

                                



                                        BSA:    1.95 m2       

   Age: 1950,69Y   

                                  



                                        Sex:    MALE        

  BP:    115/67    

                            



                                        HR:    69 bpm       

  Sonogrphr: EDI Rios   

                                  



                                        Pat. Stat.:Outpatient       

Room:   [facility]       

                                  



                          Study Status:Final         



                          Echo Event ID:[id number]       



                          Order ID: [id number]        



                          Reason for Study:chemotherapy    



                          Procedures: 2D Echo, Colorflow Doppler, Strain 



                          Race:   C            



                          ------------------------------------ 



                          SUMMARY:                  



                          ------------------------------------ 



                          LV EF is normal.          



                          Estimated EF is 55-59%.  



                          RV systolic function is normal. 



                          Estimated PA systolic pressure is 26-31 mmHg, assuming

 a mean RAP of 



                          5-10 mmHg.                



                          ------------------------------------ 



                          FINDINGS:                 



                          ------------------------------------ 



                          LV:    LV EF is normal. Overall wall motion is n

ormal. Estimated EF 



                               is 55-59%. Normal average LV global jon

gitudinal strain at 



                               -18.4%.    



                          RV:    RV size is normal. RV systolic function i

s normal. 



                          LA:    LA volume is severely enlarged. 



                          RA:    RA size is normal. 



                          AO:    Aortic root diameter is normal. 



                          MARK:   No pericardial effusion. 



                          AV:    No structural AV abnormalities noted. 



                          MV:    No structural MV abnormalities noted. A t

race of mitral 



                               regurgitation. 



                          PV:    No structural PV abnormalities noted. 



                          TV:    No structural TV abnormalities noted. Mil

d tricuspid 



                               regurgitation 



                          [place]:   Normal diastolic function and LV filling p

ressures. 



                          Other:  Estimated PA systolic pressure is 26-31 mm

Hg, assuming a 



                               mean RAP of 5-10 mmHg. 



                          ------------------------------------ 



                          MEASUREMENTS:             



                          ------------------------------------ 



                                           2D 



                          Parasternal Long Axis     



                           Ao An      2.4 cm      LV%fs 

      30 %  



                                         Ao Rtd      3.4 cm    

  Index    1.7 cm/m2   

                                



                           LVPWd      0.96 cm  



                                         IVSd       1.1 cm    

  RWT       0.4  

                                      



                                         LVIDd      4.9 cm    

  Index    2.5 cm/m2  

                                



                           LV Mass     186 g  (122-174) 



                           LVIDs      3.4 cm      LVM In

dex     96 g/m 



                          LA Sng Plane              



                           LA Area      27 cm (8.8-23.4) LA Vol 

     93 ml  



                           Index    48 ml/m2 



                           LA LngAx      6 cm      



                          LVOT Stroke Vol           



                           LVOT       2.3 cm      



                          LVOT                    



                           LVOT Area    4.2 cm      



                                          DOPPLE

R 



                          LVOT Stroke Vol           



                                         LVOT TVI     18 cm    

  LVOT CI     2.4 l/m/m

                                        



                           LVOT SV      76 ml      HR  

      61 bpm 



                           LVOT CO     4.6 l/min     



                          LVOT                    



                           SVi        39 ml/m     



                                                    



                          Signed 2020 11:28 AM 



                          Franck Yoon M.D.           









                                        Procedure Note

 

                                        Interface, Radiology Results In - 2020 11:28 AM CDT







                                                                Echocardiography

 Report



                                                   [address] [place] [address]



                                        



                                        Pat.Name:  MEGAN AVERY    Pat.I

D:    [id number]



                                        .Date:   2020               Refer

.MD:  MATT MCGREGOR MD



                                        Exam Time: 9:07:00 AM              Study

 Type:Routine Echo



                                        Height:    73in                    Weigh

t:    159lb



                                        BSA:       1.95 m2                   

Age:  1950,69Y



                                        Sex:       MALE                    BP:  

      115/67



                                        HR:        69 bpm                  Sonog

rphr: [facility]



                                        Pat. Stat.:Outpatient              Room:

      [facility]



                                        Study Status:Final



                                        Echo Event ID:[id number]



                                        Order ID:  [id number]



                                        Reason for Study:chemotherapy



                                        Procedures: 2D Echo, Colorflow Doppler, 

Strain



                                        Race:      C



                                        ------------------------------------



                                        SUMMARY:



                                        ------------------------------------



                                        LV EF is normal.



                                        Estimated EF is 55-59%.



                                        RV systolic function is normal.



                                        Estimated PA systolic pressure is 26-31 

mmHg, assuming a mean RAP of



                                        5-10 mmHg.



                                        ------------------------------------



                                        FINDINGS:



                                        ------------------------------------



                                        LV:       LV EF is normal. Overall wall 

motion is normal. Estimated EF



                                                  is 55-59%. Normal average LV g

lobal longitudinal strain at



                                                  -18.4%.



                                        RV:       RV size is normal. RV systolic

 function is normal.



                                        LA:       LA volume is severely enlarged

.



                                        RA:       RA size is normal.



                                        AO:       Aortic root diameter is normal

.



                                        MARK:     No pericardial effusion.



                                        AV:       No structural AV abnormalities

 noted.



                                        MV:       No structural MV abnormalities

 noted. A trace of mitral



                                                  regurgitation.



                                        PV:       No structural PV abnormalities

 noted.



                                        TV:       No structural TV abnormalities

 noted. Mild tricuspid



                                                  regurgitation



                                        [place]:     Normal diastolic function and 

LV filling pressures.



                                        Other:    Estimated PA systolic pressure

 is 26-31 mmHg, assuming a



                                                  mean RAP of 5-10 mmHg.



                                        ------------------------------------



                                        MEASUREMENTS:



                                        ------------------------------------



                                                                          2D



                                        Parasternal Long Axis



                                         Ao An            2.4 cm            LV%f

s             30 %



                                         Ao Rtd           3.4 cm            Inde

x        1.7 cm/m2



                                         LVPWd           0.96 cm



                                         IVSd             1.1 cm            RWT 

             0.4



                                         LVIDd            4.9 cm            Inde

x        2.5 cm/m2



                                         LV Mass          186 g    (122-174)



                                         LVIDs            3.4 cm            LVM 

Index         96 g/m



                                        LA Sng Plane



                                         LA Area           27 cm  (8.8-23.4) L

A Vol            93 ml



                                         Index        48 ml/m2



                                         LA LngAx           6 cm



                                        LVOT Stroke Vol



                                         LVOT             2.3 cm



                                        LVOT



                                         LVOT Area        4.2 cm



                                                                        DOPPLER



                                        LVOT Stroke Vol



                                         LVOT TVI          18 cm            LVOT

 CI          2.4 l/m/m



                                         LVOT SV           76 ml            HR  

              61 bpm



                                         LVOT CO          4.6 l/min



                                        LVOT



                                         SVi               39 ml/m



                                        



                                        Signed 2020 11:28 AM



                                        Franck Yoon M.D.









                Performing Organization Address         City/[facility]/Zipcode Phone

 Number

 

                [facility]        [address] [place] [address] 



Folate level (2020  9:43 AM CDT)





             Component    Value        Ref Range    Performed At Pathologist Sig

nature

 

             Folate       10.8         4.8 - 24.2 ng/mL [facility] 









                                        Specimen

 

                                        Serum









                Performing Organization Address         City/[facility]

 Number

 

                [facility] DEPARTMENT OF PATHOLOGY AND [address] [place] [address]

0 



                [address] [address] 



Vitamin B12 level (2020  9:43 AM CDT)





             Component    Value        Ref Range    Performed At Pathologist



                                                                 Signature

 

             Vitamin B12  532          211 - 946    [facility] 



                          Comment:     pg/mL        HOSPITAL     



                          Significant overlap exists between normal and deficien

cy states.                           



                          However, most patients with deficiencies will have Ser

um B12                             



                                                    <200 pg/mL.      

                 

                                                    



                                                                 









                                        Specimen

 

                                        Serum









                Performing Organization Address         City/[facility] Phone

 Number

 

                [facility] DEPARTMENT OF PATHOLOGY AND [address] [place] [address]

0 



                [address] [address] 



Total iron binding capacity (2020  9:42 AM CDT)





             Component    Value        Ref Range    Performed At Pathologist Sig

nature

 

             Iron level   15 (L)       59 - 158 ug/dL [facility]     

 

             Iron binding capacity 178 (L)      200 - 400 ug/dL [facility]     

 

             % Saturation 8.4 (L)      20.0 - 40.0 % [facility]     









                                        Specimen

 

                                        Blood









                Performing Organization Address         City/[facility] Phone

 Number

 

                [facility] DEPARTMENT OF PATHOLOGY AND [address] [place] [address]

0 



                [address] [address] 



Ferritin level (2020  9:42 AM CDT)





             Component    Value        Ref Range    Performed At Pathologist Sig

nature

 

             Ferritin level 228          30 - 400 ng/mL [facility] 









                                        Specimen

 

                                        Blood









                Performing Organization Address         City/[facility] Phone

 Number

 

                [facility] DEPARTMENT OF PATHOLOGY AND [address] [place] [address]

0 



                [address] [address] 



IR Port Placement (2020 11:36 AM CDT)





                                        Specimen

 

                                        









                          Narrative                 Performed At

 

                                        EXAMINATION: IR PORT PLACEMENT



                                        [facility]



                                         



                                        



                          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

salvador unspecified, 



                                        Z01.818 Encounter for other preprocedura

l examination, esophageal cancer



                                        



                                         



                                        



                                        COMPARISON: None.



                                        



                                         



                                        



                                        PROCEDURE: Venous port placement



                                        



                                         



                                        



                                        Procedural Personnel



                                        



                                        Attending physician(s): Yuri vang MD



                                        



                                         



                                        



                                        Pre-procedure diagnosis: Esophageal canc

er



                                        



                                        Post-procedure diagnosis: Same



                                        



                                        Indication: Administration of chemothera

py



                                        



                                        Additional clinical history: None



                                        



                                         



                                        



                                        Complications: No immediate complication

s.



                                        



                                         



                                        



                          IMPRESSION: Insertion of right-sided power-injectable 

single-lumen 



                          tunneled chest port, with catheter tip in the expected

 location of the 



                                        cavoatrial junction.



                                        



                                         



                                        



                                        Plan: The port may be used immediately. 



                                        



                                        ________________________________________

_______________________



                                        



                                         



                                        



                                        PROCEDURE SUMMARY:



                                        



                                        - Venous access with ultrasound guidance



                                        



                                        - Tunneled port insertion under fluorosc

opic guidance



                                        



                                        - Additional procedure(s): None



                                        



                                         



                                        



                                        Pre-procedure



                                        



                                        History and imaging of central venous ac

cess reviewed (QCDR): Yes 



                                        



                          Consent: Informed consent for the procedure including 

risks, benefits 



                          and alternatives was obtained and time-out was perform

ed prior to the 



                                        procedure.



                                        



                          Preparation (MIPS): The site was prepared and draped u

sing all elements 



                          of maximal sterile barrier technique including sterile

 gloves, sterile 



                          gown, cap, mask, large sterile sheet, sterile ultrasou

nd probe cover, 



                                        hand hygiene and cutaneous antisepsis 



                                        



                                        with 2% chlorhexidine. 



                                        



                                        Medical reason for site preparation exce

ption (MIPS): Not applicable



                                        



                                         



                                        



                                        Anesthesia/sedation



                                        



                                        Level of anesthesia/sedation: Moderate s

edation (conscious sedation)



                                        



                          Anesthesia/sedation administered by: Independent train

ed observer under 



                          attending supervision with continuous monitoring of th

e patients level 



                                        of consciousness and physiologic status



                                        



                                        Total intra-service sedation time (minut

es): 43



                                        



                                         



                                        



                                        Access



                                        



                          Local anesthesia was administered. The vessel was sono

graphically 



                          evaluated and determined to be patent. Real time ultra

sound was used to 



                          visualize needle entry into the vessel and a permanent

 image was not 



                                        stored.



                                        



                                        Vein accessed: Internal jugular vein



                                        



                                        Access technique: Micropuncture set with

 21 gauge needle



                                        



                                         



                                        



                                        Port placement



                                        



                          An incision was made at the upper chest, a pocket was 

created, and the 



                          catheter was tunneled subcutaneously to the venous acc

ess site and 



                          trimmed to appropriate length. The port was inserted i

nto the pocket and 



                                        the catheter was advanced via a peel-urmila

y 



                                        



                          sheath into the vein under fluoroscopic guidance. The 

port was not 



                          sutured into the pocket. Catheter tip location was flu

oroscopically 



                                        verified and a permanent image was store

d.



                                        



                                        Port placed: [employer] SmartPort



                                        



                                        Catheter size (French): 6



                                        



                                        Catheter flush: Heparin (100 units/mL)



                                        



                                         



                                        



                                        Closure



                                        



                          The access site and incision were closed and sterile d

ressing(s) were 



                                        applied.



                                        



                                        Access site closure technique: Tissue ad

hesive



                                        



                                        Incision closure technique: Absorbable s

uture and tissue adhesive



                                        



                                        Patient discharged from procedure suite 

with device accessed: No



                                        



                                         



                                        



                                        Contrast



                                        



                                        Contrast agent: None



                                        



                                        Contrast volume (mL): 0



                                        



                                         



                                        



                                        Radiation Dose



                                        



                                        Reference air kerma (mGy): 7 



                                        



                                         



                                        



                                         



                                        



                                        Additional Details



                                        



                                        Additional description of procedure: Non

e



                                        



                                        Equipment details: None



                                        



                                        Specimens removed: None



                                        



                                        Estimated blood loss (mL): Less than 10



                                        



                                        Standardized report: SIR_Port_v2



                                        



                                         



                                        



                                         



                                        



                          [facility]-[id number]            









                                        Procedure Note

 

                                        Hm Interface, Radiology Results Incoming

 - 2020  2:59 PM CDT



EXAMINATION:  IR PORT PLACEMENT



                                        



                                        CLINICAL HISTORY:  C15.9 Malignant neopl

asm of esophagus  unspecified, Z01.818 

Encounter for other preprocedural examination, esophageal cancer



                                        



                                        COMPARISON:  None.



                                        



                                        PROCEDURE: Venous port placement



                                        



                                        Procedural Personnel



                                        Attending physician(s): Yuri vang MD



                                        



                                        Pre-procedure diagnosis: Esophageal canc

er



                                        Post-procedure diagnosis: Same



                                        Indication: Administration of chemothera

py



                                        Additional clinical history: None



                                        



                                        Complications: No immediate complication

s.



                                        



                                        IMPRESSION: Insertion of right-sided pow

er-injectable single-lumen tunneled 

chest port, with catheter tip in the expected location of the cavoatrial 
junction.



                                        



                                        Plan: The port may be used immediately.



                                        ________________________________________

_______________________



                                        



                                        PROCEDURE SUMMARY:



                                        - Venous access with ultrasound guidance



                                        - Tunneled port insertion under fluorosc

opic guidance



                                        - Additional procedure(s): None



                                        



                                        Pre-procedure



                                        History and imaging of central venous ac

cess reviewed (QCDR): Yes



                                        Consent: Informed consent for the proced

ure including risks, benefits and 

alternatives was obtained and time-out was performed prior to the procedure.



                                        Preparation (MIPS): The site was prepare

d and draped using all elements of 

maximal sterile barrier technique including sterile gloves, sterile gown, cap, 
mask, large sterile sheet, sterile ultrasound probe cover, hand



                                        hygiene and cutaneous antisepsis



                                        with 2% chlorhexidine.



                                        Medical reason for site preparation exce

ption (MIPS): Not applicable



                                        



                                        Anesthesia/sedation



                                        Level of anesthesia/sedation: Moderate s

edation (conscious sedation)



                                        Anesthesia/sedation administered by: Ind

ependent trained observer under 

attending supervision with continuous monitoring of the patients level of 
consciousness and physiologic status



                                        Total intra-service sedation time (minut

es): 43



                                        



                                        Access



                                        Local anesthesia was administered. The v

essel was sonographically evaluated and 

determined to be patent. Real time ultrasound was used to visualize needle entry
into the vessel and a permanent image was not stored.



                                        Vein accessed: Internal jugular vein



                                        Access technique: Micropuncture set with

 21 gauge needle



                                        



                                        Port placement



                                        An incision was made at the upper chest,

 a pocket was created, and the catheter 

was tunneled subcutaneously to the venous access site and trimmed to appropriate
length. The port was inserted into the pocket and the



                                        catheter was advanced via a peel-away



                                        sheath into the vein under fluoroscopic 

guidance. The port was not sutured into 

the pocket. Catheter tip location was fluoroscopically verified and a permanent 
image was stored.



                                        Port placed: [employer] SmartPort



                                        Catheter size (French): 6



                                        Catheter flush: Heparin (100 units/mL)



                                        



                                        Closure



                                        The access site and incision were closed

 and sterile dressing(s) were applied.



                                        Access site closure technique: Tissue ad

hesive



                                        Incision closure technique: Absorbable s

uture and tissue adhesive



                                        Patient discharged from procedure suite 

with device accessed: No



                                        



                                        Contrast



                                        Contrast agent: None



                                        Contrast volume (mL): 0



                                        



                                        Radiation Dose



                                        Reference air kerma (mGy): 7



                                        



                                        



                                        Additional Details



                                        Additional description of procedure: Non

e



                                        Equipment details: None



                                        Specimens removed: None



                                        Estimated blood loss (mL): Less than 10



                                        Standardized report: SIR_Port_v2



                                        



                                        



                                        [facility]-[id number]









                Performing Organization Address         City/State/Zipcode Phone

 Number

 

                [facility]      [address] [place] [address] 



XR Chest 2 Vw (2020 12:36 PM CDT)





                                        Specimen

 

                                        









                          Narrative                 Performed At

 

                                        EXAMINATION: XR CHEST 2 VW



                                         [facility]



                                         



                                        



                          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

salvador unspecified, 



                                        esopahgeal cancer



                                        



                                         



                                        



                                        COMPARISON: None



                                        



                                         



                                        



                                        IMPRESSION:



                                        



                                        1.Examination was performed with nipple 

markers.



                                        



                                        2.No pulmonary infiltrates or nodules ar

e seen.



                                        



                                        3.Heart size within normal limits. Vesse

ls are not congested.



                                        



                                         



                                        



                                         



                                        



                          [id number]           









                                        Procedure Note

 

                                         Interface, Radiology Results Incoming

 - 2020 12:41 PM CDT



EXAMINATION:  XR CHEST 2 VW



                                        



                                        CLINICAL HISTORY:  C15.9 Malignant neopl

asm of esophagus  unspecified, 

esopahgeal cancer



                                        



                                        COMPARISON:  None



                                        



                                        IMPRESSION:



                                        1.Examination was performed with nipple 

markers.



                                        2.No pulmonary infiltrates or nodules ar

e seen.



                                        3.Heart size within normal limits. Vesse

ls are not congested.



                                        



                                        



                                        [id number]









                Performing Organization Address         City/[facility]/Zipcode Phone

 Number

 

                [facility]      [address] [place] [id number] 



Cancer antigen 19-9 (2020 12:08 PM CDT)





             Component    Value        Ref Range    Performed At Pathologist



                                                                 [facility]

 

             CA 19-9      1            0 - 35 U/mL  [facility] 



                          Comment:                  [facility]     



                          The Roche Moises 8000  immunoassay was used.     

                      



                          Results obtained with different assay methods or kits 

                          



                          should not be used interchangeably and may be differen

t.                           



                                                                 









                                        Specimen

 

                                        Blood









                Performing Organization Address         City[facility]

 Number

 

                [facility] DEPARTMENT OF PATHOLOGY AND [address] [place] [address]

0 



                [address] [address] 



Partial thromboplastin time, activated (2020 12:08 PM CDT)





             Component    Value        Ref Range    Performed At Pathologist



                                                                 [facility]

 

             PTT          27.8         23.0 - 36.0  [facility] 



                          Comment:     [facility]     



                          PTT therapeutic range for unfractionated heparin is   

                        



                          61.0-112.0 seconds which corresponds to Anti-Xa       

                    



                          0.3-0.7 U/ml.                           



                                                                 









                                        Specimen

 

                                        Blood









                Performing Organization Address         City/[facility] Phone

 Number

 

                [facility] DEPARTMENT OF PATHOLOGY AND [address] [place] [address]

0 



                [address] [address] 



Prothrombin time with INR (2020 12:08 PM CDT)





             Component    Value        Ref Range    Performed At Pathologist



                                                                 [facility]

 

             Prothrombin time 13.3         11.5 - 14.5  [facility]     

 

             INR          1.0                       [place]      



                          Comment:                  [facility]    



                          The International Normalized Ratio (INR) is a therapeu

[facility]              HOSPITAL     



                          monitoring tool for patients who are stable on oral   

                        



                          anticoagulant therapy. An INR of 2.0-3.0 is suggested 

for deep                           



                          vein thrombosis/pulmonary embolism.                   

        



                                                                 









                                        Specimen

 

                                        Blood









                Performing Organization Address         City/[facility] Phone

 Number

 

                [facility] DEPARTMENT OF PATHOLOGY AND [address] [place] [address]

0 



                [address] [address] 



Carcinoembryonic antigen (CEA) (2020 12:08 PM CDT)





             Component    Value        Ref Range    Performed At Pathologist



                                                                 Signature

 

             CEA          3.3          0.0 - 3.8    [facility] 



                          Comment:     ng/mL        HOSPITAL     



                          Reference range for heavy smokers: 0.0 - 5.5 ng/mL  

                         



                          The ROCHE Moises 8000 CEA immunoassay was used.        

                   



                          Results obtained with different assay methods or kits 

                          



                          should not be used interchangeably and may be differen

t.                           



                                                                 









                                        Specimen

 

                                        Serum









                Performing Organization Address         City/[facility] Phone

 Number

 

                [facility] DEPARTMENT OF PATHOLOGY AND [address] [place] [address]

0 



                [address] [address] 



CT Abd/Pelvic External Study (2020  8:38 AM CST)





                                        Specimen

 

                                        









                          Narrative                 Performed At

 

                                        This exam was not acquired at a [facility]

t facility and has not been 



                                         [facility]



                                        interpreted by a [facility] Provider. T

he exam was imported into our 



                                        



                          imaging system.           









                Performing Organization Address         City/[facility]

 Number

 

                [facility]      [address] [place] [address] 



XR Chest External Study (2020  8:11 AM CST)





                                        Specimen

 

                                        









                          Narrative                 Performed At

 

                                        This exam was not acquired at a [facility]

t facility and has not been 



                                         [facility]



                                        interpreted by a [employer] Provider. T

he exam was imported into our 



                                        



                          imaging system.           









                Performing Organization Address         City/[facility]

 Number

 

                [facility]      [address] [place] [address] 



after 2019



Insurance







          Payer     Benefit Plan / Group Subscriber ID Effective Dates Phone    

 Address   Type

 

          [employer] xxxxxxxxxxxx 2017-Present 

                    HMO



                    ESSENTIALS                                         









           Guarantor Name Account Type Relation to Date of    Phone      Billing



                                 Patient    Birth                 Address

 

           Korey Avery Personal/Family Self       1950 [phone] [address]                                          (Home)     [place] [address]







Advance Directives

For more information, please contact: [phone]





                Type            Date Recorded   Patient Representative Explanati

on

 

                Advance Directives, Living Will 6/10/2020  7:14 AM              

   



                and Medical Power of

## 2020-07-04 NOTE — EDPHYS
Physician Documentation                                                                           

 [facility]                                                                 

Name: Megan Julio                                                                         

Age: 69 yrs                                                                                       

Sex: Male                                                                                         

: 1950                                                                                   

MRN: [id number]                                                                                   

Arrival Date: 2020                                                                          

Time: 15:04                                                                                       

Account#: [id number]                                                                            

Bed 5                                                                                             

Private MD: Ramu Harris T                                                                        

ED Physician Kenia Lira                                                                    

HPI:                                                                                              

                                                                                             

15:52 This 69 yrs old  Male presents to ER via Ambulatory with complaints of         ma2 

      Difficulty Swallowing.                                                                      

15:52 Onset: The symptoms/episode began/occurred gradually, 2 day(s) ago. Associated signs    ma2 

      and symptoms: Pertinent negatives: diaphoresis, hemoptysis, nausea, visual changes.         

      Severity of symptoms: At their worst the symptoms were moderate in the emergency            

      department the symptoms are unchanged. The patient has experienced similar episodes in      

      the past. hx of esophageal cancer, had a stent that fell into his stomach last week         

      evidenced by xr.. he usually able to eat soft food, however he is unable to do that         

      since this morning, unable to drink water.                                                  

15:55 he want to be transferred to [facility].                                                 ma2 

                                                                                                  

Historical:                                                                                       

- Allergies:                                                                                      

16:01 Sulfa (Sulfonamide Antibiotics);                                                        ph  

- Home Meds:                                                                                      

16:01 aspirin 81 mg Oral susp 1 tab once daily [Active]; lisinopril Oral [Active]; Omeprazole ph  

      Oral [Active]; Tramadol Oral [Active]; Zofran Oral [Active];                                

- PMHx:                                                                                           

16:01 Cancer; ESOPHAGEAL WITH METS TO LYMPH NODES AND LIVER; CVA; Hypertension;               ph  

                                                                                                  

- Immunization history:: Adult Immunizations unknown.                                             

- Social history:: Patient/guardian denies using alcohol, street drugs, The patient               

  lives with family, Smoking status: Patient denies any tobacco usage or history of.              

- Family history:: not pertinent.                                                                 

                                                                                                  

                                                                                                  

ROS:                                                                                              

15:55 Constitutional: Negative for fever, chills, and weight loss.                            ma2 

15:55 All other systems are negative.                                                             

                                                                                                  

Exam:                                                                                             

15:55 Constitutional:  This is a well developed, well nourished patient who is awake, alert,  ma2 

      and in no acute distress. Chest/axilla:  Normal chest wall appearance and motion.           

      Nontender with no deformity.  No lesions are appreciated. Cardiovascular:  Regular rate     

      and rhythm with a normal S1 and S2.  No gallops, murmurs, or rubs.  Normal PMI, no JVD.     

       No pulse deficits. Respiratory:  Lungs have equal breath sounds bilaterally, clear to      

      auscultation and percussion.  No rales, rhonchi or wheezes noted.  No increased work of     

      breathing, no retractions or nasal flaring. Abdomen/GI:  Soft, non-tender, with normal      

      bowel sounds.  No distension or tympany.  No guarding or rebound.  No evidence of           

      tenderness throughout. MS/ Extremity:  Pulses equal, no cyanosis.  Neurovascular            

      intact.  Full, normal range of motion. Neuro:  Awake and alert, GCS 15, oriented to         

      person, place, time, and situation.  Cranial nerves II-XII grossly intact.  Motor           

      strength 5/5 in all extremities.  Sensory grossly intact.  Cerebellar exam normal.          

      Normal gait.                                                                                

                                                                                                  

Vital Signs:                                                                                      

16:00  / 75; Pulse 65; Resp 18; Pulse Ox 97% on R/A;                                    ph  

16:25 Temp 97.9(TE);                                                                          ph  

16:39  / 80; Pulse 61; Resp 18; Pulse Ox 98% on R/A;                                    ph  

17:45  / 82; Pulse 56; Resp 18; Pulse Ox 98% on R/A;                                    ph  

18:46  / 65; Pulse 62; Resp 18; Temp 97.5; Pulse Ox 100% ;                              ph  

19:14  / 81; Pulse 65; Resp 19; Temp 98.2; Pulse Ox 99% ;                               rr5 

                                                                                                  

MDM:                                                                                              

15:24 Patient medically screened.                                                             [facility] 

15:55 Differential diagnosis: unable to swallow likely d/t esophageal obstruction, stent      ma2 

      failure vs esophageal cancer.                                                               

17:12 Data reviewed: vital signs, nurses notes. Counseling: I had a detailed discussion with  [facility] 

      the patient and/or guardian regarding: the historical points, exam findings, and any        

      diagnostic results supporting the discharge/admit diagnosis, the presence of at least       

      one elevated blood pressure reading (>120/80) during this emergency department visit,       

      the need for outpatient follow up.                                                          

17:13 ED course: discussed with dr. Yates at [facility] he will arrange for transfer .      [facility] 

                                                                                                  

                                                                                             

15:45 Order name: Basic Metabolic Panel; Complete Time: 16:49                                 [facility] 

                                                                                             

15:45 Order name: CBC with Diff; Complete Time: 18:00                                         [facility] 

                                                                                             

15:45 Order name: Hepatic Function; Complete Time: 16:49                                      [facility] 

                                                                                             

15:45 Order name: Lipase; Complete Time: 16:49                                                [facility] 

                                                                                             

17:18 Order name: CBC Smear Scan; Complete Time: 18:00                                        [facility]

                                                                                             

15:45 Order name: IV Saline Lock; Complete Time: 16:25                                        [facility] 

                                                                                             

15:45 Order name: Labs collected and sent; Complete Time: 16:25                               ma2 

                                                                                                  

Administered Medications:                                                                         

16:25 Drug: Dextrose 5 % in 1/2 Normal Saline with KCl 10 mEq/L 1000 ml Route: IV; Rate: 100  ph  

      ml/hr; Site: left forearm;                                                                  

19:07 Follow up: Response: No adverse reaction; IV Status: Infusion continued upon transfer   ph  

                                                                                                  

                                                                                                  

Disposition:                                                                                      

20 17:15 Transfer ordered to [facility]. Diagnosis are Dysphagia, Esophageal          

  obstruction.                                                                                    

- Reason for transfer: Higher level of care.                                                      

- Accepting physician is Thoicker.                                                               

- Condition is Stable.                                                                            

- Problem is new.                                                                                 

- Symptoms are unchanged.                                                                         

                                                                                                  

                                                                                                  

                                                                                                  

Signatures:                                                                                       

Dispatcher [employer]                           [facility]                                                 

Alis Wasserman RN                      RN                                                      

Kenia Lira MD MD   ma2                                                  

George Fox RN                      RN   rr5                                                  

                                                                                                  

Corrections: (The following items were deleted from the chart)                                    

17:18 17:15 2020 17:15 Transfer ordered to [facility]. Diagnosis is Dysphagia.    ma2 

      Reason for transfer: Higher level of care. Accepting physician is Thoicker. Condition      

      is Stable. Problem is new. Symptoms are unchanged. ma2                                      

19:28 17:18 2020 17:15 Transfer ordered to [facility]. Diagnosis is Dysphagia;    rr5 

      Esophageal obstruction. Reason for transfer: Higher level of care. Accepting physician      

      is Thoicker. Condition is Stable. Problem is new. Symptoms are unchanged. ma2              

                                                                                                  

**************************************************************************************************

## 2020-07-04 NOTE — XMS REPORT
Continuity of Care Document

                             Created on:2020



Patient:KAYLI DODSON

Sex:Male

:1950

External Reference #:[id number]





Demographics







                          Address                   [address] [place] [address]

 

                          Home Phone                [phone]

 

                          Work Phone                [phone]

 

                          Mobile Phone              [phone]

 

                          Email Address             NONE

 

                          Preferred Language        English

 

                          Marital Status            Unknown

 

                          Congregation Affiliation     Unknown

 

                          Race                      Unknown

 

                          Additional Race(s)        Unavailable



                                                    White

 

                          Ethnic Group              Unknown









Author







                          Organization              [facility]

t

 

                          Address                   [address]



                                                    [place] [address]

 

                          Phone                     [phone]









Support







                Name            Relationship    Address         Phone

 

                LAUREANO  Spouse          Unavailable     [phone]

 

                Gena  Spouse          [address]  [phone]



                                                [place] [address] 









Care Team Providers







                    Name                Role                Phone

 

                    Sujit Harris MD  Primary Care Physician [phone]

 

                    Jose Mcgregor MD   Attending Clinician [phone]

 

                    Becka SANCHEZ        Attending Clinician Unavailable

 

                    Luis Wood RN     Attending Clinician Unavailable

 

                    Osmel [facility]     Attending Clinician Unavailable

 

                    Terrell [facility]            Attending Clinician Unavailable

 

                    JOHNATHAN Cartwright MD      Attending Clinician [phone]

 

                    Lola [facility]         Attending Clinician Unavailable

 

                    Navid Sharma [facility]         Attending Clinician Unavailable

 

                    Susanna ESCALERA            Attending Clinician [phone]

 

                    Jackelyn STEWART          Attending Clinician [phone]

 

                    Aniyah Spencer NP   Attending Clinician [phone]

 

                    Elton RN,  M     Attending Clinician Unavailable

 

                    Anson SWEET           Attending Clinician Unavailable

 

                    Amanda Gordillo RD Attending Clinician Unavailable

 

                    ANYI ALVAREZ    Attending Clinician Unavailable

 

                    Anyi Alvarez MD Attending Clinician [phone]

 

                    Linda Alba MD Attending Clinician [phone]

 

                    Alberta Crespo MD     Attending Clinician [phone]

 

                    Carol Kern CRNA Attending Clinician [phone]

 

                    Zachary ESCALERA          Attending Clinician [phone]

 

                    Tom SANCHEZ         Attending Clinician Unavailable

 

                    Cherri ESCALERA,  Cristian    Attending Clinician [phone]

 

                    Karina Stephenson MD  Attending Clinician [phone]

 

                    Justice INFANTE        Attending Clinician Unavailable

 

                    Cornelius SANCHEZ       Attending Clinician Unavailable

 

                    Provider            Attending Clinician Unavailable

 

                    GREER Siddiqui MD   Attending Clinician [phone]

 

                    CHERRI              Admitting Clinician Unavailable

 

                    ANYI ALVAREZ    Admitting Clinician Unavailable









Payers







           Payer Name Policy     Policy Number Effective  Expiration Source



                      Type                  Date       Date       

 

           [employer] [place]            xxxxxxxxxxxx 2017            [facility]



           AREA/HMO BLUE                       00:00:00              [place]



           ESSENTIALSxxxxxxxxxxx                                             



           [id number]-PresentHMO                                             

 

           BLUE CROSS/BLUE            xxxxxxxxxxxx                       [facility]



           SHIELDBCBS HMO                                             - Medical



           BLUE/ESSENTIALSxxxxxx                                             [place]

ter



           [id number]                                             



           [id number]                                             



           [id number]                                             



           [address]                                             







Problems







       Condition Condition Condition Status Onset  Resolution Last   Treating Co

mments 

Source



       Name   Details Category        Date   Date   Treatment Clinician        



                                                 Date                 

 

       Hypotensio Hypotensio Disease Active -                             [facility]

[place]



       n due to n due to                                              [facility]

i



       drugs  drugs                00:00:                             st



                                   00                                 

 

       Hematemesi Hematemesi Disease Active -                             C

[facility]



       s      s                    4-14                               [facility]



                                   00[facility]00[facility]                             Medical



                                   00                                 Center

 

       Dehydratio Dehydratio Disease Active 2020-                             [facility]

[place]



       n      n                    4-09                               [facility]



                                   00:00:                             st



                                   00                                 

 

       GE     GE     Disease Active -                             [place]



       junction junction               3-25                               Method

i



       carcinoma carcinoma               00:00:                             st



                                   00                                 

 

       Liver  Liver  Disease Active -                             [place]



       metastases metastases               3-25                               Me

thodi



                                   00:00:                             st



                                   00                                 

 

       Gastrointe Gastrointe Disease Resolve -2020       

        [place]



       stinal stinal        d      4-24   00:00:00 13:54:07               Method

i



       hemorrhage hemorrhage               00:00:                             st



       associated associated               00                                 



       with   with                                                    



       gastric gastric                                                  



       ulcer  ulcer                                                   







History of Past Illness







       Condition Condition Condition Status Onset  Resolution Last   Treating Co

mments 

Source



       Name   Details Category        Date   Date   Treatment Clinician        



                                                 Date                 

 

       Esophageal Esophageal Disease Resolve -2020       

        [place]



       dysphagia dysphagia        d      3-25   00:00:00 13:54:05               

[facility]



                                   00:00:                             st



                                   00                                 







Allergies, Adverse Reactions, Alerts







       Allergy Allergy Status Severity Reaction(s) Onset  Inactive Treating Comm

ents 

Source



       Name   Type                        Date   Date   Clinician        

 

       Sulfur Propensi Active        Other (See                Flu like [facility]

[facility]



              ty to                Comments) 4-14                 symptoms [facility]

 -



              adverse                      00:00:                      Medical



              reaction                      00                          Center



              s                                                       

 

       Sulfa  Propensi Active        Other (See                Flu like Lalo velazquez



       (Sulfona ty to                Comments) 3-25                 symptoms Met

hodi



       mide   adverse                      00:00:                      st



       Antibiot reaction                      00                          



       ics)   s to                                                    



              drug                                                    







Family History







           Family Member Diagnosis  Comments   Start Date Stop Date  Source

 

           Natural father Heart attack                                  [facility] [place]

 

           Natural mother Breast cancer                                  [place]

 [facility]







Social History







           Social Habit Start Date Stop Date  Quantity   Comments   Source

 

           History of tobacco                       Current smoker            

caroline [facility]



           use                                                    

 

           History [facility] Meth

odist



           Alcohol Std Drinks                                             

 

           History [facility] Meth

odist



           Alcohol Binge                                             

 

           Sex Assigned At                                             [facility]

ethodist



           Birth                                                  

 

           Exposure to                       Not sure              [place] [facility]

dist



           SARS-CoV-2 (event)                                             

 

           Cigarettes smoked 2020                       [facility] [place]



           current (pack per 00:00:00   00:00:00                         



           day) - Reported                                             

 

           Alcohol intake 2020 Lifetime              [facility]

thodist



                      00:00:00   00:00:00   non-drinker            



                                            (finding)             

 

           Tobacco Comment 2020 1 cigar daily            [facility]



                      00:00[facility]00   00:00:00                         [facility]

 

           Alcohol Comment 2020 very rarely            [facility]

[facility] -



                      00:00:00   00:00:00                         [facility]

 

           History SDOH 2020 1                     [place] Meth

odist



           Alcohol Frequency 00:00:00   00:00:00                         









                Smoking Status  Start Date      Stop Date       Source

 

                Former smoker   2020 00:00:00 2020 00:00:00 [facility] [place]

 

                Current every day 2020 00:00:00                 [facility]

es - Medical



                smoker                                          Center







Medications







       Ordered Filled Start  Stop   Current Ordering Indication Dosage Frequency

 Signature

                    Comments            Components          Source



     Medication Medication Date Date Medication? Clinician                (SIG) 

          



     Name Name                                                   

 

     aspirin            Yes            81mg QD   Take 81 mg           [facility]

[facility]



     (ECOTRIN)      6-10                               by mouth           Method

i



     81 MG      09:39:                               daily.           st



     enteric      24                                                



     coated                                                        



     tablet                                                        

 

     ondansetron            Yes            8mg  Q.5D Take 8 mg           [facility]

[place]



     ODT       6-10                               by mouth 2           Methodi



     (ZOFRAN-ODT      09:39:                               (two)           st



     ) 8 MG      24                                 times a           



     disintegrat                                         day.           



     ing tablet                                                        

 

     pantoprazol      2020-0      Yes            40mg Q.5D Take 1           [facility]



     e         4-17                               tablet (40           [facility] -



     (PROTONIX)      00:00:                               mg total)           Me

dical



     40 MG      00                                 by mouth 2           Center



     tablet                                         (two)           



                                                  times           



                                                  daily.           

 

     pantoprazol      2020-0      Yes            40mg Q.5D Take 40 mg           

[place]



     e         4-17                               by mouth 2           [facility]



     (PROTONIX)      00:00:                               (two)           st



     40 MG EC      00                                 times a           



     tablet                                         day.           

 

     traMADoL      2020-0      Yes            50mg      Take 50 mg           [facility]

ston



     (ULTRAM) 50      08                               by mouth.           Met

hodi



     mg tablet      00:00:                                              st



               00                                                

 

     dronabinoL      2020-0 2020- No             2.5mg Q.5D Take 1           [facility]

ston



     (Marinol)       04-18                          capsule           Method

i



     2.5 MG      00:00: 23:59                          (2.5 mg           st



     capsule      00   :00                           total) by           



                                                  mouth 2           



                                                  (two)           



                                                  times a           



                                                  day before           



                                                  meals for           



                                                  10 days.           

 

     aspirin 81      2020-0      Yes            81mg QD   Take 81 mg           C

[facility]



     MG EC      407                               by mouth           [facility] -



     tablet      09:41:                               daily.           Medical



               49                                                Center

 

     combination      2020-0      Yes                                     [facility]



     formulary      4-02                                              [facility] -



     medication      10:44:                                              Medical



               43                                                Center

 

     lisinopriL      2020-0      Yes            5mg  QD   Take 5 mg           [facility]

[facility]



     (PRINIVIL,Z      4-02                               by mouth           [facility]

s -



     ESTRIL) 5      10:04:                               daily.           Medica

l



     MG tablet      59                                                Center

 

     ondansetron      2020-0      Yes            8mg       Take 8 mg           C

HI St



     (ZOFRAN-ODT      4-02                               by mouth 2           [facility]

kes -



     ) 8 MG      10:04:                               (two)           Medical



     disintegrat      59                                 times           Center



     ing tablet                                         daily as           



                                                  needed for           



                                                  Nausea.           

 

     prochlorper      2020-0 2020- No        GE junction 5mg  Q6H  Take 1       

    [place]



     azine      3-26 04-25           carcinoma           tablet (5           Met

hodi



     (Compazine)      00:00: 23:59           (HCC)           mg total)          

 st



     5 MG tablet      00   :00                           by mouth           



                                                  every 6           



                                                  (six)           



                                                  hours as           



                                                  needed for           



                                                  nausea or           



                                                  vomiting           



                                                  for up to           



                                                  30 days.           

 

     ondansetron      2020-0 2020- No        GE junction 8mg  Q8H  Take 1       

    [place]



     ODT (Zofran      3-26 04-25           carcinoma           tablet (8        

   [facility]



     ODT) 8 MG      00:00: 23:59           (HCC)           mg total)           s

t



     disintegrat      00   :00                           by mouth           



     ing tablet                                         every 8           



                                                  (eight)           



                                                  hours as           



                                                  needed for           



                                                  nausea or           



                                                  vomiting           



                                                  for up to           



                                                  30 days.           

 

     lisinopriL                                            [place]

n



     (PRINIVIL)       05-27                                         Methodi



     5 mg tablet      00:00: 00:00                                         st



               00   :00                                          







Vital Signs







             Vital Name   Observation Time Observation Value Comments     Source

 

             Systolic blood 2020 15:40:00 109 mm[Hg]                [place]

n [place]



             pressure                                            

 

             Diastolic blood 2020 15:40:00 66 mm[Hg]                 [place]

on [facility]



             pressure                                            

 

             Heart rate   2020 15:40:00 58 /min                   [place] 

[facility]

 

             Body temperature 2020 15:40:00 36.56 Ashley                 [facility]

[facility]

 

             Respiratory rate 2020 15:40:00 18 /min                   [facility]

ton [place]

 

             Body height  2020 15:40:00 185.4 cm                  [place] 

[facility]

 

             Body weight  2020 15:40:00 66.56 kg                  [place] 

[facility]

 

             BMI          2020 15:40:00 19.36 kg/m2               [place] 

[facility]

 

             Oxygen saturation in 2020 15:40:00 99 /min                   

[place] [facility]



             Arterial blood by                                        



             Pulse oximetry                                        

 

             Systolic blood 2020 07:50:00 110 mm[Hg]                [facility]

 

             Diastolic blood 2020 07:50:00 77 mm[Hg]                 [facility]

[facility]

 

             Heart rate   2020 07:50:00 86 /min                   [facility]

 

             Body temperature 2020 07:50:00 36.94 Ashley                 [facility]

 

             Respiratory rate 2020 07:50:00 18 /min                   [facility]

 

             Oxygen saturation in 2020 07:50:00 95 /min                   

[facility]



             Arterial blood by                                        Medical Ce

nter



             Pulse oximetry                                        

 

             Body weight Measured 2020 06:00:00 72.757 kg                 

[facility]

 

             BMI          2020 06:00:00 21.16 kg/m2               [facility]

 

             Body height  2020 01:30:00 185.4 cm                  [facility]







Procedures







                Procedure       Date / Time     Performing      Source



                                Performed       Clinician       

 

                COMPREHENSIVE METABOLIC PANEL 2020      Maggie Mcgregor

 [place]



                                10:04:00                        [facility]

 

                HC COMPLETE BLD COUNT W/AUTO DIFF 2020      Maggie Mcgregor Ed [place]



                                10:04:00                        [facility]

 

                MAGNESIUM LEVEL 2020      Maggie Mcgregor [place]



                                10:04:00                        [facility]

 

                ESTIMATED GFR   2020      Maggie Mcgregor [place]



                                10:04:00                        [facility]

 

                COMPREHENSIVE METABOLIC PANEL 2020-06-15      Maggie Mcgregor

 [place]



                                09:15:00                        [facility]

 

                HC COMPLETE BLD COUNT W/AUTO DIFF 2020-06-15      Maggie Mcgregor Ed [place]



                                09:15:00                        [facility]

 

                MAGNESIUM LEVEL 2020-06-15      Maggie Mcgregor [place]



                                09:15:00                        [facility]

 

                ESTIMATED GFR   2020-06-15      SukhdevharriettMaggie [place]



                                09:15:00                        [facility]

 

                FL < 1 HOUR     2020-06-10      Kacie Cartwright [place]



                                08:47:00                        [facility]

 

                ESOPHAGOGASTRODUODENOSCOPY (EGD) 2020-06-10      Kacie Cartwright

 [place]



                                07:52:00                        [facility]

 

                CT CHEST W CONTRAST ABDOMEN W 2020      Riky Maggie gio

 [place]



                CONTRAST PELVIS W CONTRAST 10:33:55                        [facility]

dist

 

                COMPREHENSIVE METABOLIC PANEL 2020      harriettMaggie

 [place]



                                09:59:00                        [facility]

 

                HC COMPLETE BLD COUNT W/AUTO DIFF 2020      SukhdevharriettMaggie Ed

powers [place]



                                09:59:00                        [facility]

 

                MAGNESIUM LEVEL 2020      SukhdevharriettMaggie [place]



                                09:59:00                        [facility]

 

                ESTIMATED GFR   2020      SukhdevharriettMaggie Edgio [place]



                                09:59:00                        [facility]

 

                ECG 12-LEAD     2020-05-15      Mark Robbins



                                00:00:00        Brijesh           [facility]

 

                COMPREHENSIVE METABOLIC PANEL 2020      Riyk Maggie Jose

 [place]



                                09:23:00                        [facility]

 

                HC COMPLETE BLD COUNT W/AUTO DIFF 2020      RikyMaggie Ed

powers [place]



                                09:23:00                        [facility]

 

                MAGNESIUM LEVEL 2020      Riky Maggie Jose [place]



                                09:23:00                        [facility]

 

                ESTIMATED GFR   2020      Maggie Mcgregor [place]



                                09:23:00                        [facility]

 

                TTE COMPLETE, WO CONTRAST, W 2020      Maggie Mcgregor



                DOPPLER ([id number]) 11:08:48                        [facility]

 

                VITAMIN B12 LEVEL 2020      Maggie Mcgregor [place]



                                09:43:00                        [facility]

 

                FOLATE LEVEL    2020      Maggie Mcgregor [place]



                                09:43:00                        [facility]

 

                COMPREHENSIVE METABOLIC PANEL 2020      Maggie Mcgregor

 [place]



                                09:42:00                        [facility]

 

                HC COMPLETE BLD COUNT W/AUTO DIFF 2020      Maggie Mcgregor Ed [place]



                                09:42:00                        [facility]

 

                MAGNESIUM LEVEL 2020      Maggie Mcgregor [place]



                                09:42:00                        [facility]

 

                FERRITIN LEVEL  2020      Maggie Mcgregor [place]



                                09:42:00                        [facility]

 

                TOTAL IRON BINDING CAPACITY 2020      Maggie Mcgregor

[place]



                                09:42:00                        [facility]

 

                ESTIMATED GFR   2020      Maggie Mcgregor [place]



                                09:42:00                        [facility]

 

                REPORT OF PROCEDURE - ENDOSCOPY 2020      Provider, Piyush barrios [facility] -



                SCAN            15:40:38        [facility]

 

                RHYTHM STRIP - SCAN 2020      Provider, [facility]

es -



                                15:40:28        [facility]

 

                TRANSFUSION SERVICE REPORT - SCAN 2020      Provider, Vinh cline [facility] -



                                18:01:21        [facility]

 

                CBC W/PLT COUNT & AUTO 2020      [facility]respo [facility] -



                DIFFERENTIAL    03:54:00        [facility]

 

                TRANSFUSION SERVICE REPORT - SCAN 2020      Provider, Vinh cline [facility] -



                                17:50:55        [facility]

 

                CBC (HEMOGRAM ONLY) 2020      [facility]

 -



                                12:37:00        [facility]

 

                CBC (HEMOGRAM ONLY) 2020      [facility]

 -



                                05:43:00        [facility]

 

                BASIC METABOLIC PANEL (7) 2020      [facility]



                                05:42:00                        [facility]

 

                CBC (HEMOGRAM ONLY) 2020      [facility]

 -



                                00:07:00        [facility]

 

                PREPARE LEUKO-REDUCED RBC 2020-04-15      [facility] -



                                23:54:00                        [facility]

 

                CBC (HEMOGRAM ONLY) 2020-04-15      Verna       [facility]

 -



                                18:01:00        [facility]

 

                TRANSFUSION SERVICE REPORT - SCAN 2020-04-15      Vinh Shah

nataliiadenis [facility] -



                                17:51:06        Scanning        [facility]

 

                CBC (HEMOGRAM ONLY) 2020-04-15      Verna       [facility]

 -



                                12:42:00        [facility]

 

                POCT-GLUCOSE METER 2020-04-15      Parth         [facility] 

-



                                05:30:00        [facility]        

 

                CBC (HEMOGRAM ONLY) 2020-04-15      Verna       [facility]

 -



                                04:25:00        [facility]

 

                BASIC METABOLIC PANEL (7) 2020-04-15      [facility]



                                04:25:00                        [facility]

 

                CBC (HEMOGRAM ONLY) 2020      Verna       [facility]

 -



                                23:33:00        [facility]

 

                POCT-GLUCOSE METER 2020      Parth         [facility] 

-



                                23:32:00        [facility]        

 

                POCT-GLUCOSE METER 2020      Parth         [facility] 

-



                                18:14:00        [facility]        

 

                POCT-GLUCOSE METER 2020      [facility] Alvarez [facility]

s -



                                15:34:00        [facility]

 

                CBC (HEMOGRAM ONLY) 2020      Paramjit Mayo [facility]

s -



                                15:25:00                        [facility]

 

                REPORT OF PROCEDURE - ENDOSCOPY 2020      Jj Sharma [facility] -



                URL             12:49:16                        [facility]

 

                UPPER ENDOSCOPY 2020      Jj Sharma  [facility] -



                                11:00:00                        [facility]

 

                CBC (HEMOGRAM ONLY) 2020      Param[facility]it Mayo [facility]

s -



                                08:25:00                        [facility]

 

                TRANSFUSE LEUKO-REDUCED RED BLOOD 2020      Paramjit Mayo [facility] -



                CELLS           07:19:43                        [facility]

 

                HEMOGLOBIN A1C  2020      Paramjit Mayo [facility] -



                                04:55:00                        [facility]

 

                BLOOD CULTURE   2020      Gael, Paramjit W [facility] -



                                04:54:00                        [facility]

 

                ABORH, MANUAL   2020      Reyes, Meredith [facility] -



                                03:11:00        [facility]

 

                BLOOD CULTURE   2020      Gael, Paramjit W [facility] -



                                02:53:00                        [facility]

 

                TYPE AND SCREEN, AUTOMATED 2020      Gael, [facility]it W [facility] -



                                02:52:00                        [facility]

 

                COMPREHENSIVE METABOLIC PANEL 2020      Gael, Paramjit W C

[facility]



                                02:40:00                        [facility]

 

                MAGNESIUM       2020      Gael, Paramjit W [facility] -



                                02:40:00                        [facility]

 

                LACTIC ACID, VENOUS 2020      Gael, [facility]it W [facility]

s -



                                02:40:00                        [facility]

 

                PROTHROMBIN TIME/INR 2020      Gael, [facility] W [facility]

es -



                                02:40:00                        [facility]

 

                CBC W/PLT COUNT & AUTO 2020      Gael, [facility]it [facility] -



                DIFFERENTIAL    02:40:00                        [facility]

 

                XR CHEST 1 VIEW PORTABLE/BEDSIDE 2020      Gael, Paramjit 

W [facility] -



                                02:28:00                        [facility]

 

                REPORT OF PROCEDURE - ENDOSCOPY 2020      CartwrightKacie saunders [facility] -



                URL             11:35:29                        [facility]

 

                FL FLUORO NON-SPECIFIC UP TO 1 2020      CartwrightKacie

 [facility] -



                HOUR            11:30:00                        [facility]

 

                UPPER ENDOSCOPY,WALL STENT 2020      [facility]ie [facility]



                                11:00:00                        [facility]

 

                PROCEDURE W/ C-ARM 2020      CartwrightKacie [facility]

s -



                                11:00:00                        [facility]

 

                COMPREHENSIVE METABOLIC PANEL 2020      Maggie Mcgregor

 [place]



                                09:25:00                        [facility]

 

                HC COMPLETE BLD COUNT W/AUTO DIFF 2020      Maggie Mcgregor Ed [place]



                                09:25:00                        [facility]

 

                MAGNESIUM LEVEL 2020      Maggie Mcgregor [place]



                                09:25:00                        [facility]

 

                ESTIMATED GFR   2020      Maggie Mcgregor [place]



                                09:25:00                        [facility]

 

                IR PORT PLACEMENT 2020      Maggie Mcgregor [place]



                                11:36:04                        [facility]

 

                XR CHEST 2 VW   2020      Maggie Mcgregor [place]



                                12:36:44                        [facility]

 

                HC COMPLETE BLD COUNT W/AUTO DIFF 2020      Maggie Mcgregor Ed [place]



                                12:08:00                        [facility]

 

                COMPREHENSIVE METABOLIC PANEL 2020      Maggie Mcgregor

 [place]



                                12:08:00                        [facility]

 

                CARCINOEMBRYONIC ANTIGEN (CEA) 2020      Maggie Mcgregor

jack [place]



                                12:08:00                        [facility]

 

                CANCER ANTIGEN 19-9 2020      Maggie Mcgregor [place]



                                12:08:00                        [facility]

 

                PROTHROMBIN TIME WITH INR 2020      Maggie Mcgregor

[place]



                                12:08:00                        [facility]

 

                PARTIAL THROMBOPLASTIN TIME (PTT) 2020      Maggie Mcgregor Ed [place]



                                12:08:00                        [facility]

 

                ESTIMATED GFR   2020      Maggie Mcgregor [place]



                                12:08:00                        [facility]

 

                CT ABD/PELVIC EXTERNAL STUDY 2020      Maggie Mcgregor 

[place]



                                08:38:00                        [facility]

 

                XR CHEST EXTERNAL STUDY 2020      Maggie Mcgregor [place]

on



                                08:11:00                        [facility]







Plan of Care







             Planned Activity Planned Date Details      Comments     Source

 

             Future Scheduled Test 2020   INFLUENZA VACCINE              [facility]



                          00:00:00     [code = INFLUENZA              



                                       VACCINE]                  

 

             Future Scheduled Test 2013   65+ PNEUMOCOCCAL              Ho

[facility]



                          00:00:00     VACCINE (1 of 2 -              



                                       PCV13) [code = 65+              



                                       PNEUMOCOCCAL VACCINE              



                                       (1 of 2 - PCV13)]              

 

             Future Scheduled Test 1998   COLONOSCOPY SCREENING           

   [place] [facility]



                          00:00:00     [code = COLONOSCOPY              



                                       SCREENING]                

 

             Future Scheduled Test 1998   SHINGLES VACCINES              H

[facility]



                          00:00:00     (#1) [code = SHINGLES              



                                       VACCINES (#1)]              

 

             Future Appointment 2020   Kacie Cartwright MD, [address]

[address]is [place]



                          09:00:00     [address],              



                                       [place], [place]               



                                       [phone]                

 

             Future Appointment 2020   Kacie Cartwright MD, [address]

[address] [place]



                          09:00:00     [address],              



                                       [place], [place]               



                                       [phone]                







Encounters







        Start   End     Encounter Admission Attending Care    Care    Encounter 

Source



        Date/Time Date/Time Type    Type    Clinicians Facility Department ID   

   

 

        2020 Outpatient         MAGGIE MCGREGOR [facility]     210

[id number] [place]



        00:00:00 00:00:00                                         994     Method

i



                                                                        st

 

        2020 Outpatient         MAGGIE MCGREGOR [facility]     210

[id number] [place]



        00:00:00 00:00:00                                         000     Method

i



                                                                        st

 

        2020 Outpatient         MAGGIE MCGREGOR [facility]     210

[id number] [place]



        00:00:00 00:00:00                                         917     Method

i



                                                                        st

 

        2020 Outpatient         MAGGIE MCGREGOR [facility]     210

[id number] [place]



        00:00:00 00:00:00                                         553     Method

i



                                                                        st

 

        2020-06-15 2020-06-15 Outpatient         MAGGIE MCGREGOR [facility]     210

[id number] [place]



        00:00:00 00:00:00                                         346     Method

i



                                                                        st

 

        2020-06-10 2020-06-10 Outpatient         CHERRI [facility]     021     [id number]

[id number] [place]



        00:00:00 00:00:00                 ANDINO                    322     Method

i



                                                                        st

 

        2020 Outpatient         MAGGIE MCGREGOR [facility]     210

[id number] [place]



        00:00:00 00:00:00                                         186     Method

i



                                                                        st

 

        2020 Outpatient         MAGGIE MCGREGOR [facility]     210

[id number] [place]



        00:00:00 00:00:00                                         081     Method

i



                                                                        st

 

        2020 Outpatient         MAGGIE MCGREGOR [facility]     210

[id number] [place]



        00:00:00 00:00:00                                         993     Method

i



                                                                        st

 

        2020 Outpatient         MAGGIE MCGREGOR [facility]     210

[id number] [place]



        00:00:00 00:00:00                                         422     Method

i



                                                                        st

 

        2020-05-15 2020-05-15 Outpatient         MAGGIE MCGREGOR [facility]     210

[id number] [place]



        00:00:00 00:00:00                                         823     Method

i



                                                                        st

 

        2020-05-15 2020-05-15 Outpatient                 [facility]     [id number]

[id number] [place]



        00:00:00 00:00:00                                         715     Method

i



                                                                        st

 

        2020 Outpatient         MAGGIE MCGREGOR [facility]     210

[id number] [place]



        00:00:00 00:00:00                                         774     Method

i



                                                                        st

 

        2020 Outpatient         MAGGIE MCGREGOR [facility]     210

[id number] [place]



        00:00:00 00:00:00                                         435     Method

i



                                                                        st

 

        2020 Outpatient         MAGGIE MCGREGOR [facility]     210

[id number] [place]



        00:00:00 00:00:00                                         013     Method

i



                                                                        st

 

        2020 Outpatient         MAGGIE MCGREGOR [facility]     210

[id number] [place]



        00:00:00 00:00:00                                         375     Method

i



                                                                        st

 

        2020 Outpatient         MAGGIE MCGREGOR [facility]     210

[id number] [place]



        00:00:00 00:00:00                                         484     Method

i



                                                                        st

 

        2020 Outpatient         MAGGIE MCGREGOR [facility]     210

[id number] [place]



        00:00:00 00:00:00                                         177     Method

i



                                                                        st

 

        2020 Outpatient         MAGGIE MCGREGOR [facility]     210

[id number] [place]



        00:00:00 00:00:00                                         740     Method

i



                                                                        st

 

        2020 Outpatient         MAGGIE MCGREGOR [facility]     210

[id number] [place]



        00:00:00 00:00:00                                         551     Method

i



                                                                        st

 

        2020 Outpatient         MAGGIE MCGREGOR [facility]     210

[id number] [place]



        00:00:00 00:00:00                                         488     Method

i



                                                                        st

 

        2020 Outpatient         MAGGIE MCGREGOR [facility]     210

[id number] [place]



        00:00:00 00:00:00                                         301     Method

i



                                                                        st

 

        2020 Outpatient         MAGGIE MCGREGOR [facility]     210

[id number] [place]



        00:00:00 00:00:00                                         915     Method

i



                                                                        st

 

        2020 Outpatient         MAGGIE MCGREGOR [facility]     210

[id number] [place]



        00:00:00 00:00:00                                         200     Method

i



                                                                        st

 

        2020 Outpatient         MAGGIE MCGREGOR [facility]     210

[id number] [place]



        00:00:00 00:00:00                                         821     Method

i



                                                                        st

 

        2020 Outpatient         MAGGIE MCGREGOR [facility]     210

[id number] [place]



        00:00:00 00:00:00                                         880     Method

i



                                                                        st

 

        2020 Outpatient         MIKE [facility]     2100

[id number] [place]



        00:00:00 00:00:00                 SAMIRA                    464     Method

i



                                                                        st







Results







           Test Description Test Time  Test Comments Results    Result Comments 

Source









                    Comprehensive metabolic panel 2020 11:28:19 









                      Test Item  Value      Reference Range Interpretation Comme

nts









             Sodium (test code = 2951-2) 137          135- 148 mEq/L            

  

 

             Potassium (test code = 2823-3) 3.6          3.5- 5.0 mEq/L         

     

 

             Chloride (test code = 5-0) 101          98- 112 mEq/L           

   

 

             CO2 (test code = -9) 22           24- 31 mEq/L L            

 

             Anion gap (test code = 33037-3) 14@ANIO      7- 15 mEq/L           

    

 

             BUN (test code = 3094-0) 12 mg/dL     8-23                      

 

             Creatinine (test code = 2160-0) 0.89 mg/dL   0.7-1.2               

    

 

             Glucose (test code = 2345-7) 113 mg/dL    65-99        H           

 

 

             Calcium (test code = 22592-0) 9.3 mg/dL    8.8-10.2                

  

 

             Protein (test code = 2885-2) 6.3 g/dL     6.3-8.3                  

 -[place]



                                                                  4.6-7.0 g/dL1 

week



                                                                              4.

4-7.6



                                                                 g/dL7 months-1y

ear



                                                                     5.1-7.3 g/d

L1-2



                                                                 years



                                                                 5.6-7.5 g/dL>3 

years



                                                                             6.0

-8.0



                                                                 g/xW82-003



                                                                     6.3-8.3 g/d

L

 

             Albumin (test code = 1751-7) 2.8 g/dL     3.5-5        L           

 

 

             A/G ratio (test code = 1759-0) 0.8          0.7-3.8                

   

 

             Alkaline phosphatase (test code = 98 U/L                     

      



             6768-6)                                             

 

             AST (test code = 1920-8) 30 U/L       10-50                     

 

             ALT (test code = 1742-6) 23 U/L       5-50                      

 

             Total bilirubin (test code = 0.6 mg/dL    0-1.2                    

 



             -2)                                             

 

             Lab Interpretation (test code = Abnormal                           

    



             66767-3)                                            



[facility] [id number] 11:28:18





             Test Item    Value        Reference Range Interpretation Comments

 

             Magnesium (test code = 55606-4) 1.8 mg/dL    1.6-2.4               

    



[facility] [id number] 11:28:17





             Test Item    Value        Reference Range Interpretation Comments

 

             Estimated GFR (test 86           mL/min/1.73 m2              [facility]  Units



             code = 5488)                                        InterpretationG

1



                                                                 >=90     Normal

 or highG2



                                                                       60-89    

Mildly



                                                                 ayebhbnsuY5w   

     45-59



                                                                  Mildly to mode

rately



                                                                 vmradsnbiF9t   

     30-44



                                                                  Moderately to 

severely



                                                                 decreasedG4    

     15-29



                                                                  Severely decre

asedG5



                                                                   <15      Kidn

ey



                                                                 failureThe eGFR

 was



                                                                 calculated bindu hu the



                                                                 Chronic Kidney 

Disease



                                                                 Epidemiology Co

llaboration



                                                                 (CKD-EPI) equat

ion.



                                                                 Interpretation 

is based on



                                                                 recommendations

 of the



                                                                 National Kidney



                                                                 Foundation-Kidn

ey Disease



                                                                 Outcomes Qualit

y Initiative



                                                                 (NKF-KDOQI) pub

lished in



                                                                 2014.



[facility]CBC with platelet and ggnqpaqyztfa9111-65-37 11:01:34





             Test Item    Value        Reference Range Interpretation Comments

 

             WBC (test code = 42362-4) 3.57         4.50- 11.00 k/uL L          

  

 

             RBC (test code = 00543-0) 3.70 m/uL    4.4-6        L            

 

             HGB (test code = 718-7) 11.1 g/dL    14-18        L            

 

             HCT (test code = 4544-3) 33.9 %       41-51        L            

 

             MCV (test code = 787-2) 91.6 fL                          

 

             MCH (test code = 785-6) 30.0 pg      27-34                     

 

             MCHC (test code = 786-4) 32.7 g/dL    31-37                     

 

             RDW - SD (test code = 63.6 fL      37-55        H            



             50301-9)                                            

 

             MPV (test code = 38623-3) 12.0 fL      8.8-13.2                  

 

             Platelet count (test code 79           150- 400 k/uL L            



             = 32328-4)                                          

 

             Nucleated RBC (test code 0.00         /100 WBC                  



             = 38202-1)                                          

 

             Neutrophils (test code = 56.1 %       39-69                     



             73417-5)                                            

 

             Lymphocytes (test code = 23.2 %       25-45        L            



             23575-1)                                            

 

             Monocytes (test code = 19.0 %       0-10         H            



             26485-3)                                            

 

             Eosinophils (test code = 0.8 %        0-5                       



             70361-0)                                            

 

             Basophils (test code = 0.6 %        0-1                       



             81382-5)                                            

 

             Immature granulocytes 0.3 %        0-1                       "Immat

ure



             (test code = 98442-2)                                        granul

ocytes"



                                                                 (promyelocytes,



                                                                 myelocytes,



                                                                 metamyelocytes)

 

             Lab Interpretation (test Abnormal                               



             code = 01599-7)                                        



[facility] MethodistFL &lt; 1 Hour2020-06-10 10:17:06Hm Interface, Radiology 
Results Incoming - 06/10/2020 10:20 AM CDTEXAMINATION:  FL &lt; 1 HOURLOCATION: 
[facility] ENDOSCOPY ROOM 8PROCEDURE: EGD w/STENT PLACEMENTSCHEDULED TIME: 
0800START TIME:  0747ENDTIME:  0847FLUORO TIME:  2.2 MINSDOSE (mGy):  22 mGy# OF
IMAGES:  8IMPRESSION:Fluoroscopy was requested in the Endoscopy Suite.  Separate
endoscopy report will be issued by the physician performing theprocedure.[facility]istCT Chest W Contrast Abdomen W Contrast Pelvis W [id number] 
11:22:03Hm Interface, Radiology Results 2020 11:25 AM 
CDTEXAMINATION:  CT CHEST W CONTRAST ABDOMEN W CONTRAST PELVIS W 
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 
adenocarcinomaTECHNIQUE:  Multiple axial images of the chest, abdomen, and 
pelvis were obtained following intravenous administration of iodinated contrast.
Sagittal and coronal computerized reformatted images were obtained. CT imaging 
was performed with iterative reconstruction techniques and/or automated exposure
control to reduce radiation dose. COMPARISON:  To a previous outside examination
from 3/5/2020IMPRESSION:CHEST:Lungs and airways: Patchy peripheral areas of 
groundglass attenuation are present in the posterior segment of the right upper 
lobe. Findings are nonspecific but are concerning for aspiration pneumonia.The 
left lung is clear. Pleura: No pleural effusion or pneumothorax.Mediastinum and 
lymph nodes: No lymphadenopathy. Cardiovascular: The heart size is normal. No 
pericardial effusion. The thoracic aorta is normal in caliber. No dissection. 
Other: A metallic stent is present in the distal esophagus extending into the 
stomach. The stomach is distended.ABDOMEN:Liver: Extensive metastatic disease is
present, most marked involving hepatic segments 7 and 8, without definite 
interval change from the March examination. Metastatic nodules measure 5 to 20 
mm.Gallbladder/Biliary: The gallbladder is normal. There is no evidence of intra
or extrahepatic biliary ductal dilatation.Spleen: The spleen is not enlarg
ed.Pancreas: The pancreas is small.Adrenal Glands: The adrenal glands are 
unremarkable.Kidneys: The kidneys are unremarkable. No mass, hydronephrosis or 
calculi. Subcentimeter cysts are present in bothkidneys not of clinical 
significance.Vascular: The abdominal aorta is nonaneurysmal.Nodes: No enlarged 
retroperitoneal or mesenteric lymphadenopathy.Bowel: No bowel obstruction or 
inflammatory changes.The appendix is normal in appearance. Moderate amount of 
feces is noted in the colon.Ascites/fluid collections: No ascites or fluid 
collections.PELVIS:No mass, fluid collection or significant adenopathy. 
MUSCULOSKELETAL: No suspicious osseous lesions. SUMMARY:1.Metastatic disease in 
the liver.2.Placement of a metallic stent across the GE junction.[id number]
[facility] Complete, (w Contrast, Strain and 
3D if needed)2020 11:28:00Interface, Radiology Results In 2020 
11:28 AM T               Echocardiography Report           [address]Name:  KAYLI REGALADO.ID:    [id number].Date:   2020               Refer.MD: 
MAGGIE MCGREGOR MD       Exam Time: 9:07:00 AM    Study Type:Routine Echo        
   Height:    73in                    Weight:    159lb        BSA:       1.95 m2
                  Age:  1950,69Y           Sex:       MALE            
BP:        115/67                  HR:        69 bpm                  Sonogrphr:
EDI Rios          Pat. Stat.:Outpatient              Room:      [facility]     
             Study Status:Final                 Echo Event ID:[id number]         
  Order ID:  [id number]              Reason forStudy:chemotherapy      
Procedures: 2D Echo, Colorflow Doppler, StrainRace:      C    
------------------------------------SUMMARY:------------------------------------

LV EF is normal.Estimated EF is 55-59%.  RV systolic function is 
normal.Estimated PA systolic pressure is 26-31 mmHg,assuming a mean RAP of5-10 
mmHg.------------------------------------FINDINGS:------------------------
------------LV:       LV EF is normal. Overall wall motion is normal. Estimated 
EF          is 55-59%. Normal average LV global longitudinal strain at          
-18.4%.RV:       RV size is normal. RV systolic function is normal.LA:       LA 
volume is severely enlarged.RA:       RA size is normal.AO:   Aortic root 
diameter is normal.MARK:     No pericardial effusion.AV:       No structural AV 
abnormalities noted.MV:       No structural MV abnormalities noted. A trace of 
mitral          regurgitation. PV:       No structural PV abnormalities 
noted.TV:       No structural TV abnormalities noted. Mild tricuspid          
regurgitation [place]:     Normal diastolic function and LV filling pressures.Other
:    Estimated PA systolic pressure is 26-31 mmHg, assuming a          mean RAP 
of 5-10 mmHg.--------
----------------------------MEASUREMENTS:------------------------------------   
      2DParasternal Long Axis Ao An            2.4 cm            LV%fs          
  30 %    Ao Rtd           3.4 cm            Index        1.7 cm/m2             
LVPWd           0.96 cm   IVSd      1.1 cm            RWT              0.4      
LVIDd            4.9 cm            Index        2.5 cm/m2             LV Mass   
      186 g    (122-174) LVIDs            3.4 cm            LVM Index       96 
g/m2LA Sng Plane LA Area           27 cm2  (8.8-23.4) LA Vol            93 ml   
Index  48 ml/m2 LA LngAx           6 cm           LVOT Stroke Vol  LVOT         
   2.3 cm           LVOT  LVOT Area        4.2 cm2                              
           DOPPLERLVOT Stroke Vol  LVOT TVI         18 cm            LVOT CI    
     2.4 l/m/m2 LVOT SV           76 ml            HR      61 bpm  LVOT CO      
   4.6 l/min        LVOT   SVi               39 ml/m2          Signed 2020
11:28 Mando Yoon M.D.[facility] MethodistVitamin B12 [id number] 11:04:15





             Test Item    Value        Reference Range Interpretation Comments

 

             Vitamin B12 (test 532 pg/mL    211-946                   Significan

t overlap



             code = 2132-9)                                        exists betwee

n normal



                                                                 and deficiency



                                                                 states.However,

 most



                                                                 patients with



                                                                 deficiencies wi

ll have



                                                                 Serum B12    <2

00 pg/mL.



                                                                 



                                                                 



                                                                 



[facility] MethodistFolate [id number] 11:04:15





             Test Item    Value        Reference Range Interpretation Comments

 

             Folate (test code = 2284-8) 10.8 ng/mL   4.8-24.2                  



[place] MethodistFerritin [id number] 10:51:58





             Test Item    Value        Reference Range Interpretation Comments

 

             Ferritin level (test code = 2276-4) 228 ng/mL                

        



[facility]istTotal iron binding [id number] 10:51:47





             Test Item    Value        Reference Range Interpretation Comments

 

             Iron level (test code = 2498-4) 15 ug/dL            L        

    

 

             Iron binding capacity (test code = 178 ug/dL    200-400      L     

       



             2500-7)                                             

 

             % Saturation (test code = 2502-3) 8.4 %        20-40        L      

      

 

             Lab Interpretation (test code = Abnormal                           

    



             53516-8)                                            



[facility] CenterBlood [facility] - [facility]ipuncture)2020 07:00:00





             Test Item    Value        Reference Range Interpretation Comments

 

             Result (test code = No growth in 5 days                           



             6463-4)                                             



[facility] CenterBL[facility] [id number] 07:00:00





             Test Item    Value        Reference Range Interpretation Comments

 

             CULTURE (BEAKER) (test No growth in 5 days                         

  



             code = 1095)                                        



BLOOD [id number] 04:00:00





             Test Item    Value        Reference Range Interpretation Comments

 

             CULTURE (BEAKER) (test No growth in 5 days                         

  



             code = 1095)                                        



CBC with platelet count + automated [id number] 05:09:00





             Test Item    Value        Reference Range Interpretation Comments

 

             WBC (test code = 6690-2) 4.8          3.5- 10.5 K/L              

 

             RBC (test code = 789-8) 2.58         4.63- 6.08 M/L L            

 

             MCHC (test code = 786-4) 32.8         32.3- 36.5 GM/DL L           

 

 

             Hematocrit (test code = 4544-3) 22.9 %       40.1-51      L        

    

 

             MCV (test code = 787-2) 88.8 fL      79-92.2                   

 

             MCH (test code = 785-6) 29.1 pg      25.7-32.2                 

 

             RDW (test code = 788-0) 12.9 %       11.6-14.4                 

 

             Platelets (test code = 777-3) 160          150- 450 K/CU MM        

      

 

             MPV (test code = 99147-8) 11.0 fL      9.4-12.4                  

 

             nRBC (test code = 413) 0            0- 0 /100 WBC              

 

             % Neutros (test code = 429) 42 %                                   

 

             % Lymphs (test code = 430) 29 %                                   

 

             % Monos (test code = 431) 19 %                                   

 

             % Eos (test code = 432) 10 %                                   

 

             % Baso (test code = 437) 0 %                                    

 

             # Neutros (test code = 670) 2.00         1.78- 5.38 K/L          

    

 

             # Lymphs (test code = 414) 1.36         1.32- 3.57 K/L           

   

 

             # Monos (test code = 415) 0.88         0.30- 0.82 K/L H          

  

 

             # Eos (test code = 416) 0.48         0.04- 0.54 K/L              

 

             # Baso (test code = 417) 0.02         0.01- 0.08 K/L             

 

 

             Immature Granulocytes-Relative 0 %          0-1                    

   



             (test code = 2801)                                        

 

             Lab Interpretation (test code = Abnormal                           

    



             25246-4)                                            



[facility] W/PLT COUNT &amp; AUTO [id number] 
05:09:00





             Test Item    Value        Reference Range Interpretation Comments

 

             WHITE BLOOD CELL COUNT (BEAKER) 4.8 K/ L     3.5-10.5              

    



             (test code = 775)                                        

 

             RED BLOOD CELL COUNT (BEAKER) 2.58 M/ L    4.63-6.08    L          

  



             (test code = 761)                                        

 

             HEMOGLOBIN (BEAKER) (test code = 7.5 GM/DL    13.7-17.5    L       

     



             410)                                                

 

             HEMATOCRIT (BEAKER) (test code = 22.9 %       40.1-51.0    L       

     



             411)                                                

 

             MEAN CORPUSCULAR VOLUME (BEAKER) 88.8 fL      79.0-92.2            

     



             (test code = 753)                                        

 

             MEAN CORPUSCULAR HEMOGLOBIN 29.1 pg      25.7-32.2                 



             (BEAKER) (test code = 751)                                        

 

             MEAN CORPUSCULAR HEMOGLOBIN CONC 32.8 GM/DL   32.3-36.5            

     



             (BEAKER) (test code = 752)                                        

 

             RED CELL DISTRIBUTION WIDTH 12.9 %       11.6-14.4                 



             (BEAKER) (test code = 412)                                        

 

             PLATELET COUNT (BEAKER) (test 160 K/CU MM  150-450                 

  



             code = 756)                                         

 

             MEAN PLATELET VOLUME (BEAKER) 11.0 fL      9.4-12.4                

  



             (test code = 754)                                        

 

             NUCLEATED RED BLOOD CELLS 0 /100 WBC   0-0                       



             (BEAKER) (test code = 413)                                        

 

             NEUTROPHILS RELATIVE PERCENT 42 %                                  

 



             (BEAKER) (test code = 429)                                        

 

             LYMPHOCYTES RELATIVE PERCENT 29 %                                  

 



             (BEAKER) (test code = 430)                                        

 

             MONOCYTES RELATIVE PERCENT 19 %                                   



             (BEAKER) (test code = 431)                                        

 

             EOSINOPHILS RELATIVE PERCENT 10 %                                  

 



             (BEAKER) (test code = 432)                                        

 

             BASOPHILS RELATIVE PERCENT 0 %                                    



             (BEAKER) (test code = 437)                                        

 

             NEUTROPHILS ABSOLUTE COUNT 2.00 K/ L    1.78-5.38                 



             (BEAKER) (test code = 670)                                        

 

             LYMPHOCYTES ABSOLUTE COUNT 1.36 K/ L    1.32-3.57                 



             (BEAKER) (test code = 414)                                        

 

             MONOCYTES ABSOLUTE COUNT (BEAKER) 0.88 K/ L    0.30-0.82    H      

      



             (test code = 415)                                        

 

             EOSINOPHILS ABSOLUTE COUNT 0.48 K/ L    0.04-0.54                 



             (BEAKER) (test code = 416)                                        

 

             BASOPHILS ABSOLUTE COUNT (BEAKER) 0.02 K/ L    0.01-0.08           

      



             (test code = 417)                                        

 

             IMMATURE GRANULOCYTES-RELATIVE 0 %          0-1                    

   



             PERCENT (BEAKER) (test code =                                      

  



             2801)                                               



CBC (Hemogram only)2020 12:54:00





             Test Item    Value        Reference Range Interpretation Comments

 

             WBC (test code = 6690-2) 5.1          3.5- 10.5 K/L              

 

             RBC (test code = 789-8) 2.58         4.63- 6.08 M/L L            

 

             MCHC (test code = 786-4) 32.8         32.3- 36.5 GM/DL L           

 

 

             Hematocrit (test code = 4544-3) 23.5 %       40.1-51      L        

    

 

             MCV (test code = 787-2) 91.1 fL      79-92.2                   

 

             MCH (test code = 785-6) 29.8 pg      25.7-32.2                 

 

             RDW (test code = 788-0) 13.0 %       11.6-14.4                 

 

             Platelets (test code = 777-3) 137          150- 450 K/CU MM L      

      

 

             MPV (test code = 78173-5) 10.8 fL      9.4-12.4                  

 

             nRBC (test code = 413) 0            0- 0 /100 WBC              

 

             Lab Interpretation (test code = Abnormal                           

    



             48642-6)                                            



[facility] (HEMOGRAM ONLY)2020 12:54:00





             Test Item    Value        Reference Range Interpretation Comments

 

             WHITE BLOOD CELL COUNT (BEAKER) 5.1 K/ L     3.5-10.5              

    



             (test code = 775)                                        

 

             RED BLOOD CELL COUNT (BEAKER) 2.58 M/ L    4.63-6.08    L          

  



             (test code = 761)                                        

 

             HEMOGLOBIN (BEAKER) (test code = 7.7 GM/DL    13.7-17.5    L       

     



             410)                                                

 

             HEMATOCRIT (BEAKER) (test code = 23.5 %       40.1-51.0    L       

     



             411)                                                

 

             MEAN CORPUSCULAR VOLUME (BEAKER) 91.1 fL      79.0-92.2            

     



             (test code = 753)                                        

 

             MEAN CORPUSCULAR HEMOGLOBIN 29.8 pg      25.7-32.2                 



             (BEAKER) (test code = 751)                                        

 

             MEAN CORPUSCULAR HEMOGLOBIN CONC 32.8 GM/DL   32.3-36.5            

     



             (BEAKER) (test code = 752)                                        

 

             RED CELL DISTRIBUTION WIDTH 13.0 %       11.6-14.4                 



             (BEAKER) (test code = 412)                                        

 

             PLATELET COUNT (BEAKER) (test 137 K/CU MM  150-450      L          

  



             code = 756)                                         

 

             MEAN PLATELET VOLUME (BEAKER) 10.8 fL      9.4-12.4                

  



             (test code = 754)                                        

 

             NUCLEATED RED BLOOD CELLS 0 /100 WBC   0-0                       



             (BEAKER) (test code = 413)                                        



Basic Metabolic [id number] 06:50:00





             Test Item    Value        Reference Range Interpretation Comments

 

             Sodium (test code = 134 meq/L    136-145      L            



             2951-2)                                             

 

             Potassium (test code = 3.5 meq/L    3.5-5.1                   



             2823-3)                                             

 

             Chloride (test code = 105 meq/L                        



             2075-0)                                             

 

             CO2 (test code = 26 meq/L     22-29                     



             2028-9)                                             

 

             BUN (test code = 7 mg/dL      7-21                      



             3094-0)                                             

 

             Creatinine (test code 0.68 mg/dL   0.57-1.25                 



             = 2160-0)                                           

 

             Glucose (test code = 108 mg/dL           H            



             2345-7)                                             

 

             Calcium (test code = 7.5 mg/dL    8.4-10.2     L            



             73075-2)                                            

 

             EGFR (test code = 115          mL/min/1.73 sq m              ESTIMA

AGUILAR GFR IS



             33914-3)                                            NOT AS ACCURATE



                                                                 AS CREATININE



                                                                 CLEARANCE IN



                                                                 PREDICTING



                                                                 GLOMERULAR



                                                                 FILTRATION RATE

.



                                                                 ESTIMATED GFR I

S



                                                                 NOT APPLICABLE



                                                                 FOR DIALYSIS



                                                                 PATIENTS.

 

             GANGA (test code = GANGA)  ID -                           



                          PHYLLIS L                                

 

             Lab Interpretation Abnormal                               



             (test code = 81566-0)                                        



[facility] METABOLIC [id number] 06:50:00





             Test Item    Value        Reference Range Interpretation Comments

 

             SODIUM (BEAKER) 134 meq/L    136-145      L            



             (test code = 381)                                        

 

             POTASSIUM (BEAKER) 3.5 meq/L    3.5-5.1                   



             (test code = 379)                                        

 

             CHLORIDE (BEAKER) 105 meq/L                        



             (test code = 382)                                        

 

             CO2 (BEAKER) (test 26 meq/L     22-29                     



             code = 355)                                         

 

             BLOOD UREA NITROGEN 7 mg/dL      7-21                      



             (BEAKER) (test code                                        



             = 354)                                              

 

             CREATININE (BEAKER) 0.68 mg/dL   0.57-1.25                 



             (test code = 358)                                        

 

             GLUCOSE RANDOM 108 mg/dL           H            



             (BEAKER) (test code                                        



             = 652)                                              

 

             CALCIUM (BEAKER) 7.5 mg/dL    8.4-10.2     L            



             (test code = 697)                                        

 

             EGFR (BEAKER) (test 115 mL/min/1.73                           ESTIM

ATED GFR IS



             code = 1092) sq m                                   NOT AS ACCURATE

 AS



                                                                 CREATININE



                                                                 CLEARANCE IN



                                                                 PREDICTING



                                                                 GLOMERULAR



                                                                 FILTRATION RATE

.



                                                                 ESTIMATED GFR I

S



                                                                 NOT APPLICABLE 

FOR



                                                                 DIALYSIS PATIEN

TS.



 ID - PHYLLIS LCBC (HEMOGRAM ONLY)2020 06:22:00





             Test Item    Value        Reference Range Interpretation Comments

 

             WHITE BLOOD CELL COUNT (BEAKER) 5.1 K/ L     3.5-10.5              

    



             (test code = 775)                                        

 

             RED BLOOD CELL COUNT (BEAKER) 2.52 M/ L    4.63-6.08    L          

  



             (test code = 761)                                        

 

             HEMOGLOBIN (BEAKER) (test code = 7.2 GM/DL    13.7-17.5    L       

     



             410)                                                

 

             HEMATOCRIT (BEAKER) (test code = 22.2 %       40.1-51.0    L       

     



             411)                                                

 

             MEAN CORPUSCULAR VOLUME (BEAKER) 88.1 fL      79.0-92.2            

     



             (test code = 753)                                        

 

             MEAN CORPUSCULAR HEMOGLOBIN 28.6 pg      25.7-32.2                 



             (BEAKER) (test code = 751)                                        

 

             MEAN CORPUSCULAR HEMOGLOBIN CONC 32.4 GM/DL   32.3-36.5            

     



             (BEAKER) (test code = 752)                                        

 

             RED CELL DISTRIBUTION WIDTH 12.9 %       11.6-14.4                 



             (BEAKER) (test code = 412)                                        

 

             PLATELET COUNT (BEAKER) (test 132 K/CU MM  150-450      L          

  



             code = 756)                                         

 

             MEAN PLATELET VOLUME (BEAKER) 11.0 fL      9.4-12.4                

  



             (test code = 754)                                        

 

             NUCLEATED RED BLOOD CELLS 0 /100 WBC   0-0                       



             (BEAKER) (test code = 413)                                        



CBC (HEMOGRAM ONLY)2020 00:15:00





             Test Item    Value        Reference Range Interpretation Comments

 

             WHITE BLOOD CELL COUNT (BEAKER) 5.4 K/ L     3.5-10.5              

    



             (test code = 775)                                        

 

             RED BLOOD CELL COUNT (BEAKER) 2.61 M/ L    4.63-6.08    L          

  



             (test code = 761)                                        

 

             HEMOGLOBIN (BEAKER) (test code = 7.5 GM/DL    13.7-17.5    L       

     



             410)                                                

 

             HEMATOCRIT (BEAKER) (test code = 22.9 %       40.1-51.0    L       

     



             411)                                                

 

             MEAN CORPUSCULAR VOLUME (BEAKER) 87.7 fL      79.0-92.2            

     



             (test code = 753)                                        

 

             MEAN CORPUSCULAR HEMOGLOBIN 28.7 pg      25.7-32.2                 



             (BEAKER) (test code = 751)                                        

 

             MEAN CORPUSCULAR HEMOGLOBIN CONC 32.8 GM/DL   32.3-36.5            

     



             (BEAKER) (test code = 752)                                        

 

             RED CELL DISTRIBUTION WIDTH 12.9 %       11.6-14.4                 



             (BEAKER) (test code = 412)                                        

 

             PLATELET COUNT (BEAKER) (test 125 K/CU MM  150-450      L          

  



             code = 756)                                         

 

             MEAN PLATELET VOLUME (BEAKER) 10.8 fL      9.4-12.4                

  



             (test code = 754)                                        

 

             NUCLEATED RED BLOOD CELLS 0 /100 WBC   0-0                       



             (BEAKER) (test code = 413)                                        



Prepare Leuko-Red RBC2020-04-15 23:54:00





             Test Item    Value        Reference Range Interpretation Comments

 

             CROSSMATCH (test code = COMPATIBLE                             



             2264)                                               

 

             Unit ABO (test code = A Neg                                  



             1626467)                                            

 

             UNIT NUMBER (test code = [id number]                           



             934-0)                                              

 

             Status (test code = 5447001) WORK IN PROGRESS                      

     

 

             Blood Bank Product (test RED BLOOD CELLS                           



             code = 2263)                                        

 

             PRODUCT CODE (test code = [id number]                               



             933-2)                                              



[facility] (HEMOGRAM ONLY)2020-04-15 18:11:00





             Test Item    Value        Reference Range Interpretation Comments

 

             WHITE BLOOD CELL COUNT (BEAKER) 6.1 K/ L     3.5-10.5              

    



             (test code = 775)                                        

 

             RED BLOOD CELL COUNT (BEAKER) 2.54 M/ L    4.63-6.08    L          

  



             (test code = 761)                                        

 

             HEMOGLOBIN (BEAKER) (test code = 7.4 GM/DL    13.7-17.5    L       

     



             410)                                                

 

             HEMATOCRIT (BEAKER) (test code = 22.9 %       40.1-51.0    L       

     



             411)                                                

 

             MEAN CORPUSCULAR VOLUME (BEAKER) 90.2 fL      79.0-92.2            

     



             (test code = 753)                                        

 

             MEAN CORPUSCULAR HEMOGLOBIN 29.1 pg      25.7-32.2                 



             (BEAKER) (test code = 751)                                        

 

             MEAN CORPUSCULAR HEMOGLOBIN CONC 32.3 GM/DL   32.3-36.5            

     



             (BEAKER) (test code = 752)                                        

 

             RED CELL DISTRIBUTION WIDTH 13.0 %       11.6-14.4                 



             (BEAKER) (test code = 412)                                        

 

             PLATELET COUNT (BEAKER) (test 124 K/CU MM  150-450      L          

  



             code = 756)                                         

 

             MEAN PLATELET VOLUME (BEAKER) 11.1 fL      9.4-12.4                

  



             (test code = 754)                                        

 

             NUCLEATED RED BLOOD CELLS 0 /100 WBC   0-0                       



             (BEAKER) (test code = 413)                                        



CBC (HEMOGRAM ONLY)2020-04-15 13:07:00





             Test Item    Value        Reference Range Interpretation Comments

 

             WHITE BLOOD CELL COUNT (BEAKER) 5.8 K/ L     3.5-10.5              

    



             (test code = 775)                                        

 

             RED BLOOD CELL COUNT (BEAKER) 2.49 M/ L    4.63-6.08    L          

  



             (test code = 761)                                        

 

             HEMOGLOBIN (BEAKER) (test code = 7.2 GM/DL    13.7-17.5    L       

     



             410)                                                

 

             HEMATOCRIT (BEAKER) (test code = 22.0 %       40.1-51.0    L       

     



             411)                                                

 

             MEAN CORPUSCULAR VOLUME (BEAKER) 88.4 fL      79.0-92.2            

     



             (test code = 753)                                        

 

             MEAN CORPUSCULAR HEMOGLOBIN 28.9 pg      25.7-32.2                 



             (BEAKER) (test code = 751)                                        

 

             MEAN CORPUSCULAR HEMOGLOBIN CONC 32.7 GM/DL   32.3-36.5            

     



             (BEAKER) (test code = 752)                                        

 

             RED CELL DISTRIBUTION WIDTH 12.9 %       11.6-14.4                 



             (BEAKER) (test code = 412)                                        

 

             PLATELET COUNT (BEAKER) (test 107 K/CU MM  150-450      L          

  



             code = 756)                                         

 

             MEAN PLATELET VOLUME (BEAKER) 11.2 fL      9.4-12.4                

  



             (test code = 754)                                        

 

             NUCLEATED RED BLOOD CELLS 0 /100 WBC   0-0                       



             (BEAKER) (test code = 413)                                        



POC-Glucose meter2020-04-15 05:44:00





             Test Item    Value        Reference Range Interpretation Comments

 

             POC-Glucose Meter (test 112 mg/dL           H            : TE

STED AT [facility]



             code = 1538)                                        [address] [facility], [address]

30:



                                                                 /Techni

melissa



                                                                 ID = [id number] for



                                                                 ELISHA FRITZ

 

             Lab Interpretation (test Abnormal                               



             code = 21473-7)                                        



[facility]





             Test Item    Value        Reference Range Interpretation Comments

 

             POC-GLUCOSE METER 112 mg/dL           H            : TESTED A

T [facility] [address]



             (BEAKER) (test code =                                        [address] [facility],



             [address]38)                                               [id number]:



                                                                 /Techni

melissa ID



                                                                 = [id number] for ELISHA BUCKLEY



BASIC METABOLIC PANEL2020-04-15 05:05:00





             Test Item    Value        Reference Range Interpretation Comments

 

             SODIUM (BEAKER) 134 meq/L    136-145      L            



             (test code = 381)                                        

 

             POTASSIUM (BEAKER) 3.9 meq/L    3.5-5.1                   



             (test code = 379)                                        

 

             CHLORIDE (BEAKER) 107 meq/L                        



             (test code = 382)                                        

 

             CO2 (BEAKER) (test 24 meq/L     22-29                     



             code = 355)                                         

 

             BLOOD UREA NITROGEN 13 mg/dL     7-21                      



             (BEAKER) (test code                                        



             = 354)                                              

 

             CREATININE (BEAKER) 0.71 mg/dL   0.57-1.25                 



             (test code = 358)                                        

 

             GLUCOSE RANDOM 123 mg/dL           H            



             (BEAKER) (test code                                        



             = 652)                                              

 

             CALCIUM (BEAKER) 7.1 mg/dL    8.4-10.2     L            



             (test code = 697)                                        

 

             EGFR (BEAKER) (test 109 mL/min/1.73                           ESTIM

ATED GFR IS



             code = 1092) sq m                                   NOT AS ACCURATE

 AS



                                                                 CREATININE



                                                                 CLEARANCE IN



                                                                 PREDICTING



                                                                 GLOMERULAR



                                                                 FILTRATION RATE

.



                                                                 ESTIMATED GFR I

S



                                                                 NOT APPLICABLE 

FOR



                                                                 DIALYSIS PATIEN

TS.



 ID - PIAYA LCBC (HEMOGRAM ONLY)2020-04-15 04:35:00





             Test Item    Value        Reference Range Interpretation Comments

 

             WHITE BLOOD CELL COUNT (BEAKER) 6.6 K/ L     3.5-10.5              

    



             (test code = 775)                                        

 

             RED BLOOD CELL COUNT (BEAKER) 2.58 M/ L    4.63-6.08    L          

  



             (test code = 761)                                        

 

             HEMOGLOBIN (BEAKER) (test code = 7.4 GM/DL    13.7-17.5    L       

     



             410)                                                

 

             HEMATOCRIT (BEAKER) (test code = 22.7 %       40.1-51.0    L       

     



             411)                                                

 

             MEAN CORPUSCULAR VOLUME (BEAKER) 88.0 fL      79.0-92.2            

     



             (test code = 753)                                        

 

             MEAN CORPUSCULAR HEMOGLOBIN 28.7 pg      25.7-32.2                 



             (BEAKER) (test code = 751)                                        

 

             MEAN CORPUSCULAR HEMOGLOBIN CONC 32.6 GM/DL   32.3-36.5            

     



             (BEAKER) (test code = 752)                                        

 

             RED CELL DISTRIBUTION WIDTH 13.1 %       11.6-14.4                 



             (BEAKER) (test code = 412)                                        

 

             PLATELET COUNT (BEAKER) (test 106 K/CU MM  150-450      L          

  



             code = 756)                                         

 

             MEAN PLATELET VOLUME (BEAKER) 11.3 fL      9.4-12.4                

  



             (test code = 754)                                        

 

             NUCLEATED RED BLOOD CELLS 0 /100 WBC   0-0                       



             (BEAKER) (test code = 413)                                        



CBC (HEMOGRAM ONLY)2020 23:56:00





             Test Item    Value        Reference Range Interpretation Comments

 

             WHITE BLOOD CELL COUNT 6.5 K/ L     3.5-10.5                  



             (BEAKER) (test code =                                        



             775)                                                

 

             RED BLOOD CELL COUNT 2.58 M/ L    4.63-6.08    L            



             (BEAKER) (test code =                                        



             761)                                                

 

             HEMOGLOBIN (BEAKER) 7.7 GM/DL    13.7-17.5    L            



             (test code = 410)                                        

 

             HEMATOCRIT (BEAKER) 22.8 %       40.1-51.0    L            



             (test code = 411)                                        

 

             MEAN CORPUSCULAR 88.4 fL      79.0-92.2                 



             VOLUME (BEAKER) (test                                        



             code = 753)                                         

 

             MEAN CORPUSCULAR 29.8 pg      25.7-32.2                 



             HEMOGLOBIN (BEAKER)                                        



             (test code = 751)                                        

 

             MEAN CORPUSCULAR 33.8 GM/DL   32.3-36.5                 



             HEMOGLOBIN CONC                                        



             (BEAKER) (test code =                                        



             752)                                                

 

             RED CELL DISTRIBUTION 13.1 %       11.6-14.4                 



             WIDTH (BEAKER) (test                                        



             code = 412)                                         

 

             PLATELET COUNT 105 K/CU MM  150-450      L            Discordant PL

T



             (BEAKER) (test code =                                        result

s compared to



             756)                                                previous result

s;



                                                                 clinical correl

ation



                                                                 required.

 

             MEAN PLATELET VOLUME 11.4 fL      9.4-12.4                  



             (BEAKER) (test code =                                        



             754)                                                

 

             NUCLEATED RED BLOOD 0 /100 WBC   0-0                       



             CELLS (BEAKER) (test                                        



             code = 413)                                         



POCT-GLUCOSE [id number] 23:45:00





             Test Item    Value        Reference Range Interpretation Comments

 

             POC-GLUCOSE METER 107 mg/dL                        : TESTED A

T [facility] [address]



             (BEAKER) (test code =                                        [address] [place],



             [address]38)                                               [address]:



                                                                 /Techni

melissa ID



                                                                 = [id number] for ROCK ORANTES



POCT-GLUCOSE [id number] 18:25:00





             Test Item    Value        Reference Range Interpretation Comments

 

             POC-GLUCOSE METER 118 mg/dL           H            : TESTED A

T [facility] [address]



             (BEAKER) (test code =                                        [address] [facility],



             [address]38)                                               [address]:



                                                                 /Techni

melissa ID



                                                                 = [id number] for ZEKE MAZARIEGOS



CBC (HEMOGRAM ONLY)2020 15:52:00





             Test Item    Value        Reference Range Interpretation Comments

 

             WHITE BLOOD CELL COUNT (BEAKER) 6.2 K/ L     3.5-10.5              

    



             (test code = 775)                                        

 

             RED BLOOD CELL COUNT (BEAKER) 2.72 M/ L    4.63-6.08    L          

  



             (test code = 761)                                        

 

             HEMOGLOBIN (BEAKER) (test code = 7.9 GM/DL    13.7-17.5    L       

     



             410)                                                

 

             HEMATOCRIT (BEAKER) (test code = 24.1 %       40.1-51.0    L       

     



             411)                                                

 

             MEAN CORPUSCULAR VOLUME (BEAKER) 88.6 fL      79.0-92.2            

     



             (test code = 753)                                        

 

             MEAN CORPUSCULAR HEMOGLOBIN 29.0 pg      25.7-32.2                 



             (BEAKER) (test code = 751)                                        

 

             MEAN CORPUSCULAR HEMOGLOBIN CONC 32.8 GM/DL   32.3-36.5            

     



             (BEAKER) (test code = 752)                                        

 

             RED CELL DISTRIBUTION WIDTH 13.0 %       11.6-14.4                 



             (BEAKER) (test code = 412)                                        

 

             PLATELET COUNT (BEAKER) (test code 24 K/CU MM   150-450      L     

       



             = 756)                                              

 

             MEAN PLATELET VOLUME (BEAKER) 11.9 fL      9.4-12.4                

  



             (test code = 754)                                        

 

             NUCLEATED RED BLOOD CELLS (BEAKER) 0 /100 WBC   0-0                

       



             (test code = 413)                                        



POCT-GLUCOSE [id number] 15:45:00





             Test Item    Value        Reference Range Interpretation Comments

 

             POC-GLUCOSE METER 118 mg/dL           H            : TESTED A

T [facility] [address]



             (BEAKER) (test code =                                        [address] [facility],



             153)                                               [id number]:



                                                                 /Techni

melissa ID



                                                                 = [id number] for LESLEE PRAJAPATI



CBC (HEMOGRAM ONLY)2020 08:51:00





             Test Item    Value        Reference Range Interpretation Comments

 

             WHITE BLOOD CELL COUNT (BEAKER) 9.4 K/ L     3.5-10.5              

    



             (test code = 775)                                        

 

             RED BLOOD CELL COUNT (BEAKER) 3.18 M/ L    4.63-6.08    L          

  



             (test code = 761)                                        

 

             HEMOGLOBIN (BEAKER) (test code = 9.5 GM/DL    13.7-17.5    L       

     



             410)                                                

 

             HEMATOCRIT (BEAKER) (test code = 27.8 %       40.1-51.0    L       

     



             411)                                                

 

             MEAN CORPUSCULAR VOLUME (BEAKER) 87.4 fL      79.0-92.2            

     



             (test code = 753)                                        

 

             MEAN CORPUSCULAR HEMOGLOBIN 29.9 pg      25.7-32.2                 



             (BEAKER) (test code = 751)                                        

 

             MEAN CORPUSCULAR HEMOGLOBIN CONC 34.2 GM/DL   32.3-36.5            

     



             (BEAKER) (test code = 752)                                        

 

             RED CELL DISTRIBUTION WIDTH 12.6 %       11.6-14.4                 



             (BEAKER) (test code = 412)                                        

 

             PLATELET COUNT (BEAKER) (test 106 K/CU MM  150-450      L          

  



             code = 756)                                         

 

             MEAN PLATELET VOLUME (BEAKER) 11.9 fL      9.4-12.4                

  



             (test code = 754)                                        

 

             NUCLEATED RED BLOOD CELLS 0 /100 WBC   0-0                       



             (BEAKER) (test code = 413)                                        



Hemoglobin [id number] 08:16:00





             Test Item    Value        Reference Range Interpretation Comments

 

             Hemoglobin A1C (test code = 4548-4) 5.5 %        4.3-6.1           

        

 

             Lab Interpretation (test code = Normal                             

    



             22825-1)                                            



[facility]HEMO[facility] [id number] 08:16:00





             Test Item    Value        Reference Range Interpretation Comments

 

             HEMOGLOBIN A1C (BEAKER) (test code = 5.5 %        4.3-6.1          

         



             368)                                                



ABORH, [id number] 03:54:00





             Test Item    Value        Reference Range Interpretation Comments

 

             ABO Grouping (test code = 2588) A                                  

    

 

             Rh Factor (test code = 2589) NEG                                   

 



[facility] CenterType [facility] screen, [id number] 03:35:00





             Test Item    Value        Reference Range Interpretation Comments

 

             ABO/RH AUTOMATED (BEAKER) (test A NEGATIVE                         

    



             code = 2260)                                        

 

             Ab Scrn (test code = 890-4) NEGATIVE                               



[facility] CenterProthrombin time/[id number] 03:30:00





             Test Item    Value        Reference Range Interpretation Comments

 

             Protime (test code = 15.6         11.9- 14.2   H            



             5902-2)                   seconds                   

 

             INR (test code = 1.3          <=5.9                     



             6301-6)                                             

 

             GANGA (test code = GANGA) Effective 2019:                         

  



                          PT Reference Range                           



                          ChangeNew: 11.9-14.2                           



                          Previous: 11.7-14.7                           



                          RECOMMENDED                            



                          COUMADIN/WARFARIN INR                           



                          THERAPY RANGESSTANDARD                           



                          DOSE: 2.0-3.0                           



                          Includes: PROPHYLAXIS                           



                          for venous thrombosis,                           



                          systemic embolization;                           



                          TREATMENT for venous                           



                          thrombosis and/or                           



                          pulmonary embolus.HIGH                           



                          RISK: Target INR is                           



                          2.5-3.5 for patients                           



                          wiht mechanical heart                           



                          valves.                                

 

             Lab Interpretation Abnormal                               



             (test code = 99724-5)                                        



[facility] CenterPROTHROMBIN TIME/[id number] 03:30:00





             Test Item    Value        Reference Range Interpretation Comments

 

             PROTIME (BEAKER) (test code = 15.6 seconds 11.9-14.2    H          

  



             759)                                                

 

             INR (BEAKER) (test code = 370) 1.3          <=5.9                  

   



Effective 2019: PT Reference Range ChangeNew: 11.9-14.2  Previous: 11.7-
14.7RECOMMENDED COUMADIN/WARFARIN INR THERAPY RANGESSTANDARD DOSE: 2.0-3.0  
Includes: PROPHYLAXIS for venous thrombosis, systemic embolization; TREATMENT 
for venous thrombosis and/or pulmonary embolus.HIGH RISK: Target INR is2.5-3.5 
for patients wiht mechanical heart valves.Comprehensive metabolic panel
2020 03:14:00





             Test Item    Value        Reference Range Interpretation Comments

 

             Protein, Total (test 4.0          6.0- 8.3 gm/dL L            



             code = 2885-2)                                        

 

             Albumin (test code = 2.1 g/dL     3.5-5        L            



             74785-5)                                            

 

             Alkaline Phosphatase 77 U/L                           



             (test code = 6768-6)                                        

 

             Total Bilirubin (test 1.4 mg/dL    0.2-1.2      H            



             code = 1975-2)                                        

 

             Sodium (test code = 131 meq/L    136-145      L            



             2951-2)                                             

 

             Potassium (test code = 4.8 meq/L    3.5-5.1                   



             2823-3)                                             

 

             Chloride (test code = 106 meq/L                        



             2075-0)                                             

 

             CO2 (test code = 21 meq/L     22-29        L            



             2028-9)                                             

 

             BUN (test code = 18 mg/dL     7-21                      



             3094-0)                                             

 

             Creatinine (test code 0.69 mg/dL   0.57-1.25                 



             = 2160-0)                                           

 

             Glucose (test code = 209 mg/dL           H            



             2345-7)                                             

 

             Calcium (test code = 7.0 mg/dL    8.4-10.2     L            



             77925-5)                                            

 

             AST (test code = 12 U/L       5-34                      



             1920-8)                                             

 

             ALT (test code = 12 U/L       6-55                      



             1742-6)                                             

 

             EGFR (test code = 113          mL/min/1.73 sq m              ESTIMA

AGUILAR GFR IS



             33914-3)                                            NOT AS ACCURATE



                                                                 AS CREATININE



                                                                 CLEARANCE IN



                                                                 PREDICTING



                                                                 GLOMERULAR



                                                                 FILTRATION RATE

.



                                                                 ESTIMATED GFR I

S



                                                                 NOT APPLICABLE



                                                                 FOR DIALYSIS



                                                                 PATIENTS.

 

             GANGA (test code = GANGA)  ID -                           



                          BS                                     

 

             Lab Interpretation Abnormal                               



             (test code = 54227-5)                                        



[facility] CenterCOMPREHENSIVE METABOLIC [id number] 03:14:00





             Test Item    Value        Reference Range Interpretation Comments

 

             TOTAL PROTEIN 4.0 gm/dL    6.0-8.3      L            



             (BEAKER) (test code =                                        



             770)                                                

 

             ALBUMIN (BEAKER) 2.1 g/dL     3.5-5.0      L            



             (test code = 1145)                                        

 

             ALKALINE PHOSPHATASE 77 U/L                           



             (BEAKER) (test code =                                        



             346)                                                

 

             BILIRUBIN TOTAL 1.4 mg/dL    0.2-1.2      H            



             (BEAKER) (test code =                                        



             377)                                                

 

             SODIUM (BEAKER) (test 131 meq/L    136-145      L            



             code = 381)                                         

 

             POTASSIUM (BEAKER) 4.8 meq/L    3.5-5.1                   



             (test code = 379)                                        

 

             CHLORIDE (BEAKER) 106 meq/L                        



             (test code = 382)                                        

 

             CO2 (BEAKER) (test 21 meq/L     22-29        L            



             code = 355)                                         

 

             BLOOD UREA NITROGEN 18 mg/dL     7-21                      



             (BEAKER) (test code =                                        



             354)                                                

 

             CREATININE (BEAKER) 0.69 mg/dL   0.57-1.25                 



             (test code = 358)                                        

 

             GLUCOSE RANDOM 209 mg/dL           H            



             (BEAKER) (test code =                                        



             652)                                                

 

             CALCIUM (BEAKER) 7.0 mg/dL    8.4-10.2     L            



             (test code = 697)                                        

 

             AST (SGOT) (BEAKER) 12 U/L       5-34                      



             (test code = 353)                                        

 

             ALT (SGPT) (BEAKER) 12 U/L       6-55                      



             (test code = 347)                                        

 

             EGFR (BEAKER) (test 113                                    ESTIMATE

D GFR IS



             code = 1092) mL/min/1.73 sq                           NOT AS ACCURA

TE AS



                          m                                      CREATININE



                                                                 CLEARANCE IN



                                                                 PREDICTING



                                                                 GLOMERULAR



                                                                 FILTRATION RATE

.



                                                                 ESTIMATED GFR I

S



                                                                 NOT APPLICABLE 

FOR



                                                                 DIALYSIS PATIEN

TS.



 ID - [id number] 03:10:00





             Test Item    Value        Reference Range Interpretation Comments

 

             Magnesium (test code = 1.7 mg/dL    1.6-2.6                   



             23313-0)                                            

 

             GANGA (test code = GANGA)  ID - BS                           

 

             Lab Interpretation (test Normal                                 



             code = 93131-1)                                        



[facility] CenterMAGNESIUM2020-04-14 03:10:00





             Test Item    Value        Reference Range Interpretation Comments

 

             MAGNESIUM (BEAKER) (test code = 1.7 mg/dL    1.6-2.6               

    



             627)                                                



 ID - BSLactic acid, [id number] 03:02:00





             Test Item    Value        Reference Range Interpretation Comments

 

             Lactate, Venous (test code = 1.75 mmol/L  0.5-2.2                  

 



             2872)                                               

 

             GANGA (test code = GANGA)  ID - BS                           

 

             Lab Interpretation (test Normal                                 



             code = 11049-8)                                        



[facility] CenterLACTIC ACID, [id number] 03:02:00





             Test Item    Value        Reference Range Interpretation Comments

 

             LACTATE BLOOD VENOUS (2) (BEAKER) 1.75 mmol/L  0.50-2.20           

      



             (test code = 2872)                                        



 ID - BSCBC W/PLT COUNT &amp; AUTO [id number] 03:01:00





             Test Item    Value        Reference Range Interpretation Comments

 

             WHITE BLOOD CELL COUNT (BEAKER) 9.6 K/ L     3.5-10.5              

    



             (test code = 775)                                        

 

             RED BLOOD CELL COUNT (BEAKER) 2.80 M/ L    4.63-6.08    L          

  



             (test code = 761)                                        

 

             HEMOGLOBIN (BEAKER) (test code = 8.3 GM/DL    13.7-17.5    L       

     



             410)                                                

 

             HEMATOCRIT (BEAKER) (test code = 24.8 %       40.1-51.0    L       

     



             411)                                                

 

             MEAN CORPUSCULAR VOLUME (BEAKER) 88.6 fL      79.0-92.2            

     



             (test code = 753)                                        

 

             MEAN CORPUSCULAR HEMOGLOBIN 29.6 pg      25.7-32.2                 



             (BEAKER) (test code = 751)                                        

 

             MEAN CORPUSCULAR HEMOGLOBIN CONC 33.5 GM/DL   32.3-36.5            

     



             (BEAKER) (test code = 752)                                        

 

             RED CELL DISTRIBUTION WIDTH 12.6 %       11.6-14.4                 



             (BEAKER) (test code = 412)                                        

 

             PLATELET COUNT (BEAKER) (test 114 K/CU MM  150-450      L          

  



             code = 756)                                         

 

             MEAN PLATELET VOLUME (BEAKER) 11.5 fL      9.4-12.4                

  



             (test code = 754)                                        

 

             NUCLEATED RED BLOOD CELLS 0 /100 WBC   0-0                       



             (BEAKER) (test code = 413)                                        

 

             NEUTROPHILS RELATIVE PERCENT 86 %                                  

 



             (BEAKER) (test code = 429)                                        

 

             LYMPHOCYTES RELATIVE PERCENT 5 %                                   

 



             (BEAKER) (test code = 430)                                        

 

             MONOCYTES RELATIVE PERCENT 9 %                                    



             (BEAKER) (test code = 431)                                        

 

             EOSINOPHILS RELATIVE PERCENT 0 %                                   

 



             (BEAKER) (test code = 432)                                        

 

             BASOPHILS RELATIVE PERCENT 0 %                                    



             (BEAKER) (test code = 437)                                        

 

             NEUTROPHILS ABSOLUTE COUNT 8.25 K/ L    1.78-5.38    H            



             (BEAKER) (test code = 670)                                        

 

             LYMPHOCYTES ABSOLUTE COUNT 0.50 K/ L    1.32-3.57    L            



             (BEAKER) (test code = 414)                                        

 

             MONOCYTES ABSOLUTE COUNT (BEAKER) 0.83 K/ L    0.30-0.82    H      

      



             (test code = 415)                                        

 

             EOSINOPHILS ABSOLUTE COUNT 0.01 K/ L    0.04-0.54    L            



             (BEAKER) (test code = 416)                                        

 

             BASOPHILS ABSOLUTE COUNT (BEAKER) 0.01 K/ L    0.01-0.08           

      



             (test code = 417)                                        

 

             IMMATURE GRANULOCYTES-RELATIVE 0 %          0-1                    

   



             PERCENT (BEAKER) (test code =                                      

  



             2801)                                               



RAD, CHEST, 1 VIEW, NON [id number] 02:35:00Reason for exam:-&gt;c/f 
aspirationShould this be performed at the bedside?-&gt;YesFINAL REPORT PATIENT 
ID:   [id number]  Chest one view. Clinical history: c/f aspiration Comparison: Non
e. Technique: A single frontal view of the chest was obtained.  Findings:There 
is a right IJ Port-A-Cath with tip in the SVC.The heart is normal in size. The 
aorta is uncoiled. There are bibasilar airspace opacities suspicious for 
pneumonia. There is no pleural effusion or pneumothorax. The bony thorax is 
unremarkable.        Signed: Soo Waldrop Verified Date/Time:  
2020 02:35:34Electronically signed by: SOO WALDROP MD on 
2020 02:35 AMXR chest 1 view portable / [id number] 02:35:00
Interface, External Ris In - 2020  2:38 AM CDTFINAL REPORT PATIENT ID:   
[id number]  Chest one view. Clinical history: c/f aspiration Comparison: None. 
Technique: A single frontal view of the chestwas obtained.  Findings:There is a 
right IJ Port-A-Cath with tip in the SVC.The heart is normal in size. The aorta 
is uncoiled. There are bibasilar airspace opacities suspicious for pneumonia. 
There isno pleural effusion or pneumothorax. The bony thorax is unremarkable.   
    Signed: Soo Waldrop Verified Date/Time:  2020 02:35:34     
Electronically signed by: SOO WALDROP MD on 2020 02:35 [facility] Health FarmFL, FLUORO, NON-SPECIFIC, UP TO 1 [id number] 12:58:00
Reason for exam:-&gt;dysphagiaFINAL REPORT PATIENT ID:   [id number] A fluoroscopic
unit was utilized for a procedure performed in the operating room. No 
interpretation was requested. Please refer to the operative report regarding 
findings. Please refer to PACS for patient radiation dose information. Signed: 
JR Borrego Robert MDReport Verified Date/Time:  2020 12:58:42 
Reading Location: [facility] Radiology Reading Room    Electronically signed 
by: ALFREDO BORREGO on 2020 12:58 PMFL fluoro non-specific up to 1 
[id number] 12:58:00Interface, External Ris In - 2020  1:00 PM CDTFINAL
REPORT PATIENT ID:   [id number] A fluoroscopic unit was utilized for a procedure 
performed in the operating room. No interpretation was requested. Please refer 
to the operative report regarding findings. Please refer to PACS for patient 
radiationdose information. Signed: JR Salima, Alfredo Schwarzcassandra Verified 
Date/Time:  2020 12:58:42Reading Location: [facility] Reading
Room    Electronically signed by: ALFREDO BORREGO on 2020 12:58 PM
[facility] CenterIR Port [id number] 14:56:41Hm Interface, 
Radiology Results  - 2020  2:59 PM CDTEXAMINATION:  IR PORT 
PLACEMENTCLINICAL HISTORY:  C15.9 Malignant neoplasm of esophagus  unspecified, 
Z01.818 Encounter for other preprocedural examination, esophageal 
cancerCOMPARISON:  None.PROCEDURE: Venous port placementProcedural Pe
rsonnelAttending physician(s): DEEPA Lewisre-procedure diagnosis: 
Esophageal cancerPost-procedure diagnosis: SameIndication: Administration of 
chemotherapyAdditional clinical history: NoneComplications: No immediate 
complications.IMPRESSION: Insertion of right-sided power-injectable single-lumen
tunneled chest port, with catheter tip in the expected location of the 
cavoatrial junction.Plan: The port may be used immediately. 
_______________________________________________________________PROCEDURE 
SUMMARY:- Venous access with ultrasound guidance- Tunneled port insertion under 
fluoroscopic guidance- Additional procedure(s): NonePre-procedureHistory and 
imaging of central venous access reviewed (QCDR): Yes Consent: Informed consent 
for the procedure including risks, benefits and alternatives was obtained and 
time-out was performed prior to the procedure.Preparation (MIPS): The site was 
prepared and draped using all elements of maximal sterile barrier technique 
including sterile gloves, sterile gown, cap, mask, large sterile sheet, sterile 
ultrasound probe cover, hand hygiene and cutaneous antisepsis with 2% 
chlorhexidine. Medical reason for site preparation exception (MIPS): Not applica
bleAnesthesia/sedationLevel of anesthesia/sedation: Moderate sedation (conscious
sedation)Anesthesia/sedation administered by: Independent trained observer under
attending supervision with continuous monitoring of the patients level of 
consciousness and physiologic statusTotal intra-service sedation time (minutes):
43AccessLocal anesthesia was administered. The vessel was sonographically 
evaluated and determined to be patent. Real time ultrasound was used to 
visualize needle entry into the vessel and a permanent image was not stored.Vein
accessed: Internal jugular veinAccess technique: Micropuncture set with 21 gauge
needlePort placementAn incision was made at the upper chest, a pocket was 
created, and the catheter was tunneled subcutaneously to the venous access site 
and trimmed to appropriate length. The port was inserted into the pocket and the
catheter was advanced via a peel-away sheath into the vein under fluoroscopic 
guidance. The port was not sutured into the pocket. Catheter tip location was 
fluoroscopically verified and a permanent image was stored.Port placed: 
Angiodynamics SmartPortCatheter size (French): 6Catheter flush: Heparin (100 
units/mL)ClosureThe access site and incision were closed and sterile dressing(s)
were applied.Access site closure technique: Tissue adhesiveIncision closure 
technique: Absorbable suture and tissue adhesivePatient discharged from 
procedure suite with device accessed: NoContrastContrast agent: NoneContrast 
volume (mL): 0Radiation DoseReference air kerma (mGy): 7 Additional 
DetailsAdditional description of procedure: NoneEquipment details: NoneSpecimens
removed: NoneEstimated blood loss (mL): Less than 10Standardized report: 
SIR_[id number] MethodistXR Chest External [id number] 
20:37:35This exam was not acquired at a [facility] facility and has not been 
interpreted by a [facility] Provider.  The exam was imported into our imaging 
system.[facility]Pelvic External [id number] 20:37:05This exam
was not acquired at a [facility] facility and has not been interpreted by a 
[facility] Provider.  The exam was imported into our imaging system.[place] 
[facility] antigen [id number] 13:00:57





             Test Item    Value        Reference Range Interpretation Comments

 

             CA 19-9 (test code 1 U/mL       0-35                      The [employer]

 [place] 8000 



             = 1006)                                             immunoassay was

 used. Results



                                                                 obtained with d

ifferent assay



                                                                 methods or kits

 should not be



                                                                 used interchang

eably and may



                                                                 be different.



Mark CrawfordCarcinoembryonic antigen (CEA)2020 13:00:10





             Test Item    Value        Reference Range Interpretation Comments

 

             CEA (test code = 3.3 ng/mL    0-3.8                     Reference r

belkis for heavy



             2039)                                             smokers:  0.0 -

 5.5



                                                                 ng/mLThe ROCHE 

[place] 8000



                                                                 CEA immunoassay

 was used.



                                                                 Results obtaine

d with



                                                                 different assay

 methods or



                                                                 kits should not

 be used



                                                                 interchangeably

 and may be



                                                                 different.



Mark CrawfordPartial thromboplastin time, [id number] 12:45:04





             Test Item    Value        Reference Range Interpretation Comments

 

             PTT (test code = 27.8         23.0- 36.0 sec              PTT thera

peutic range for



             12878-0)                                            unfractionated 

heparin



                                                                 is61.0-112.0 se

conds which



                                                                 corresponds to 

Anti-Xa0.3-0.7



                                                                 U/ml.



Mark CrawfordProthrombin time with [id number] 12:44:23





             Test Item    Value        Reference Range Interpretation Comments

 

             Prothrombin time (test 13.3         11.5- 14.5 sec              



             code = 5902-2)                                        

 

             INR (test code = 1.0                                    The Interna

tional



             42356-3)                                            Normalized Rati

o (INR) is



                                                                 a therapeutic m

onitoring



                                                                 tool for patien

ts who are



                                                                 stable on oral



                                                                 anticoagulant t

herapy. An



                                                                 INR of 2.0-3.0 

is



                                                                 suggested for d

eep vein



                                                                 thrombosis/pulm

onary



                                                                 embolism.



Mark CrawfordXR Chest 2 [id number] 12:38:27Hm Interface, Radiology Results
2020 12:41 PM CDTEXAMINATION:  XR CHEST 2 VWCLINICAL HISTORY:  
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
 NoneIMPRESSION:1.Examination was performed with nipple markers.2.No pulmonary 
infiltrates or nodules are seen.3.Heart size within normal limits. Vessels are 
not congested.[id number] Crawford

## 2020-07-04 NOTE — XMS REPORT
Clinical Summary

                             Created on:2020



Patient:Megan Ponce

Sex:Male

:1950

External Reference #:[id number]





Demographics







                          Address                   [address] [place] [address]

 

                          Home Phone                [phone]

 

                          Email Address             [email or web address]

[email or web address]

 

                          Preferred Language        English

 

                          Marital Status            Unknown

 

                          Yarsanism Affiliation     Unknown

 

                          Race                      White

 

                          Ethnic Group              Not  or 









Author







                          Organization              [facility]

 

                          Address                   [address] [place] [address]









Support







                Name            Relationship    Address         Phone

 

                MARK TINSLEY Unavailable     Unavailable     [phone] 









Care Team Providers







                    Name                Role                Phone

 

                    Sujit Harris MD  Primary Care Provider [phone]









Allergies







             Active Allergy Reactions    Severity     Noted Date   Comments

 

             Sulfur       Other (See Comments)              2020   Flu lik

e symptoms







Medications







          Medication Sig       Dispensed Refills   Start Date End Date  Status

 

          lisinopriL Take 5 mg by           0                             Active



          (PRINIVIL,ZESTRIL) 5 MG mouth daily.                                  

       



          tablet                                                      

 

          ondansetron (ZOFRAN-ODT) Take 8 mg by           0                     

        Active



          8 MG disintegrating mouth 2 (two)                                     

    



          tablet    times daily as                                         



                    needed for                                         



                    Nausea.                                           

 

          combination formulary                     0                           

  Active



          medication                                                   

 

          aspirin 81 MG EC tablet Take 81 mg by           0                     

        Active



                    mouth daily.                                         

 

          pantoprazole (PROTONIX) Take 1 tablet 180 tablet 0         2020 

          Active



          40 MG tablet (40 mg total)                                         



                    by mouth 2                                         



                    (two) times                                         



                    daily.                                            







Active Problems







                          Problem                   Noted Date

 

                          Hematemesis               2020







Encounters







             Date         Type         Specialty    Care Team    Description

 

             2020   Anesthesia Event Gastroenterology Jose Luis Kern CRNA 

 

             2020   Surgery      Gastroenterology Jj Sharma UPPER EN

DOSCOPY



                                                    MD           

 

             2020   Hospital     General Internal Anyi Alvarez, Hemate

mesis, presence of 

nausea not specified;



                -               Encounter       Medicine        MD Jamal



                                        Acute upper GI bleed;



             2020                             Parth,      Acute blood los

s anemia;



                                                    Leo  Malignant neopl

asm of lower third of esophagus (HCC)



                                                                MD Zak Mckeon Kimberly Ann, MD      

 

             2020   Travel                                 

 

             2020   Telephone    Critical Care Medicine Anyi Alvarez, 

transfer



                                                    MD Jamal   

 

             2020   Surgery      Gastroenterology Kacie Cartwright MD     ENDOSCOPY,WALL



                                                                 STENT

 

             2020   Anesthesia Event Gastroenterology Perry Stephenson MD   

 

             2020   Hospital     Gastroenterology Kacie Cartwright MD     

 

             2020   Hospital     Pre-Admission Testing Kacie Cartwright MD     

 

             2020   Travel                                 



after 2019



Social History







             Tobacco Use  Types        Packs/Day    Years Used   Date

 

             Current Every Day Smoker                                        









                Smokeless Tobacco: Never Used                                 









                                        Comments: 1 cigar daily









                Alcohol Use     Drinks/Week     oz/Week         Comments

 

                Yes                                             very rarely









                    Alcohol Habits      Answer              Date Recorded

 

                    How often do you have a drink containing alcohol? Never     

          2020

 

                    How many drinks containing alcohol do you have on a typical 

Not asked           



                    day when you are drinking?                     

 

                    How often do you have six or more drinks on one occasion? No

t asked           









                          Sex Assigned at Birth     Date Recorded

 

                          Not on file               









                    Job Start Date      Occupation          Industry

 

                    Not on file         Not on file         Not on file









                    Travel History      Travel Start        Travel End









                                        No recent travel history available.







Last Filed Vital Signs







                    Vital Sign          Reading             Time Taken

 

                    Blood Pressure      110/77              2020  7:50 AM 

CDT

 

                    Pulse               86                  2020  7:50 AM 

CDT

 

                    Temperature         36.9 C (98.5 F) 2020  7:50 AM 

CDT

 

                    Respiratory Rate    18                  2020  7:50 AM 

CDT

 

                    Oxygen Saturation   95%                 2020  7:50 AM 

CDT

 

                    Inhaled Oxygen Concentration -                   -

 

                    Weight              72.8 kg (160 lb 6.4 oz) 2020  6:00

 AM CDT

 

                    Height              185.4 cm (6' 1")    2020  1:30 AM 

CDT

 

                    Body Mass Index     21.16               2020  6:00 AM 

CDT







Plan of Treatment

Not on file



Implants







          Implanted Type      Area       Device    Shelf     Model /



                                                  Identifier Expiration Serial /

 Lot



                                                            Date      

 

           Stent Esoph Wallflx [id number]   [id number] - [id number] IMPLANTS        

      [employer]            

2020                              [id number] /



          Implanted: Qty: 1 on 2020 by Kacie Cartwright MD              

                                      /



                                                                      [id number]







Procedures







             Procedure Name Priority     Date/Time    Associated Diagnosis Comme

nts

 

             REPORT OF PROCEDURE -              2020  3:40              



             ENDOSCOPY SCAN              PM CDT                    

 

             RHYTHM STRIP - SCAN              2020  3:40              



                                       PM CDT                    

 

             TRANSFUSION SERVICE              2020  6:01              



             REPORT - SCAN              PM CDT                    

 

             CBC W/PLT COUNT & Routine      2020  3:54              Result

s for this



             AUTO DIFFERENTIAL              AM CDT                    procedure 

are in



                                                                 the results



                                                                 section.

 

             CBC W/PLT COUNT & Routine      2020  3:54              Result

s for this



             AUTO DIFFERENTIAL              AM CDT                    procedure 

are in



                                                                 the results



                                                                 section.

 

             TRANSFUSION SERVICE              2020  5:50              



             REPORT - SCAN              PM CDT                    

 

             CBC (HEMOGRAM ONLY) Routine      2020 12:37              Resu

lts for this



                                       PM CDT                    procedure are i

n



                                                                 the results



                                                                 section.

 

             CBC (HEMOGRAM ONLY) Routine      2020  5:43              Resu

lts for this



                                       AM CDT                    procedure are i

n



                                                                 the results



                                                                 section.

 

             BASIC METABOLIC PANEL Routine      2020  5:42              Re

sults for this



             (7)                       AM CDT                    procedure are i

n



                                                                 the results



                                                                 section.

 

             CBC (HEMOGRAM ONLY) Routine      2020 12:07              Resu

lts for this



                                       AM CDT                    procedure are i

n



                                                                 the results



                                                                 section.

 

             PREPARE LEUKO-REDUCED Routine      04/15/2020 11:54              Re

sults for this



             RBC                       PM CDT                    procedure are i

n



                                                                 the results



                                                                 section.

 

             CBC (HEMOGRAM ONLY) Routine      04/15/2020  6:01              Resu

lts for this



                                       PM CDT                    procedure are i

n



                                                                 the results



                                                                 section.

 

             TRANSFUSION SERVICE              04/15/2020  5:51              



             REPORT - SCAN              PM CDT                    

 

             CBC (HEMOGRAM ONLY) Routine      04/15/2020 12:42              Resu

lts for this



                                       PM CDT                    procedure are i

n



                                                                 the results



                                                                 section.

 

             POCT-GLUCOSE METER Routine      04/15/2020  5:30              Resul

ts for this



                                       AM CDT                    procedure are i

n



                                                                 the results



                                                                 section.

 

             BASIC METABOLIC PANEL Routine      04/15/2020  4:25              Re

sults for this



             (7)                       AM CDT                    procedure are i

n



                                                                 the results



                                                                 section.

 

             CBC (HEMOGRAM ONLY) Routine      04/15/2020  4:25              Resu

lts for this



                                       AM CDT                    procedure are i

n



                                                                 the results



                                                                 section.

 

             CBC (HEMOGRAM ONLY) Routine      2020 11:33              Resu

lts for this



                                       PM CDT                    procedure are i

n



                                                                 the results



                                                                 section.

 

             POCT-GLUCOSE METER Routine      2020 11:32              Resul

ts for this



                                       PM CDT                    procedure are i

n



                                                                 the results



                                                                 section.

 

             POCT-GLUCOSE METER Routine      2020  6:14              Resul

ts for this



                                       PM CDT                    procedure are i

n



                                                                 the results



                                                                 section.

 

             POCT-GLUCOSE METER Routine      2020  3:34              Resul

ts for this



                                       PM CDT                    procedure are i

n



                                                                 the results



                                                                 section.

 

             CBC (HEMOGRAM ONLY) Routine      2020  3:25              Resu

lts for this



                                       PM CDT                    procedure are i

n



                                                                 the results



                                                                 section.

 

             REPORT OF PROCEDURE -              2020 12:49              



             ENDOSCOPY URL              PM CDT                    

 

             UPPER ENDOSCOPY              2020 11:00 Gastrointestinal 



                                       AM CDT       hemorrhage, unspecified 



                                                    gastrointestinal 



                                                    hemorrhage type 

 

             CBC (HEMOGRAM ONLY) Routine      2020  8:25              Resu

lts for this



                                       AM CDT                    procedure are i

n



                                                                 the results



                                                                 section.

 

             TRANSFUSE    Routine      2020  7:19              



             LEUKO-REDUCED RED              AM CDT                    



             BLOOD CELLS                                         

 

             HEMOGLOBIN A1C Routine      2020  4:55              Results f

or this



                                       AM CDT                    procedure are i

n



                                                                 the results



                                                                 section.

 

             BLOOD CULTURE Routine      2020  4:54              Results fo

r this



                                       AM CDT                    procedure are i

n



                                                                 the results



                                                                 section.

 

             ABORH, MANUAL STAT         2020  3:11              Results fo

r this



                                       AM CDT                    procedure are i

n



                                                                 the results



                                                                 section.

 

             BLOOD CULTURE Routine      2020  2:53              Results fo

r this



                                       AM CDT                    procedure are i

n



                                                                 the results



                                                                 section.

 

             TYPE AND SCREEN, STAT         2020  2:52              Results

 for this



             AUTOMATED                 AM CDT                    procedure are i

n



                                                                 the results



                                                                 section.

 

             CBC W/PLT COUNT & Routine      2020  2:40              Result

s for this



             AUTO DIFFERENTIAL              AM CDT                    procedure 

are in



                                                                 the results



                                                                 section.

 

             PROTHROMBIN TIME/INR Routine      2020  2:40              Res

ults for this



                                       AM CDT                    procedure are i

n



                                                                 the results



                                                                 section.

 

             LACTIC ACID, VENOUS Routine      2020  2:40              Resu

lts for this



                                       AM CDT                    procedure are i

n



                                                                 the results



                                                                 section.

 

             MAGNESIUM    Routine      2020  2:40              Results for

 this



                                       AM CDT                    procedure are i

n



                                                                 the results



                                                                 section.

 

             COMPREHENSIVE Routine      2020  2:40              Results fo

r this



             METABOLIC PANEL              AM CDT                    procedure ar

e in



                                                                 the results



                                                                 section.

 

             CBC W/PLT COUNT & Routine      2020  2:40              Result

s for this



             AUTO DIFFERENTIAL              AM CDT                    procedure 

are in



                                                                 the results



                                                                 section.

 

             XR CHEST 1 VIEW STAT         2020  2:28              Results 

for this



             PORTABLE/BEDSIDE              AM CDT                    procedure a

re in



                                                                 the results



                                                                 section.

 

             REPORT OF PROCEDURE -              2020 11:35              



             ENDOSCOPY URL              AM CDT                    

 

             FL FLUORO    Routine      2020 11:30              Results for

 this



             NON-SPECIFIC UP TO 1              AM CDT                    procedu

re are in



             HOUR                                                the results



                                                                 section.

 

             PROCEDURE W/ C-ARM              2020 11:00 Dysphagia, unspeci

fied 



                                                AM CDT          type



                                        



                                                    Malignant neoplasm of 



                                                    esophagus, unspecified 



                                                    location (HCC) 









                                                    Special Needs







                                                    (C-ARM)









                UPPER ENDOSCOPY,WALL STENT                 2020 11:00 AM C

DT Dysphagia, unspecified type



                                        



                                                    Malignant neoplasm of 



                                                    esophagus, unspecified 



                                                    location (HCC) 









                                                    Special Needs







                                                    (C-ARM)



after 2019



Results

EKG-SCANNED (2020  3:40 PM CDT)





                          Narrative                 Performed At

 

                                        This result has an attachment that is no

t available.



                                        



RHYTHM STRIP - SCAN (2020  3:40 PM CDT)





                          Narrative                 Performed At

 

                                        This result has an attachment that is no

t available.



                                        



TRANSFUSION SERVICE REPORT - SCAN (2020  6:01 PM CDT)Only the most recent 
of3 resultswithin the time period is included.





                          Narrative                 Performed At

 

                                        This result has an attachment that is no

t available.



                                        



CBC with platelet count + automated diff (2020  3:54 AM CDT)Only the most 
recent of2 resultswithin the time period is included.





                Component       Value           Ref Range       Performed At

 

                WBC             4.8             3.5 - 10.5 K/L [facility]

 

                RBC             2.58 (L)        4.63 - 6.08 M/L [facility]

 

                Hemoglobin      7.5 (L)         13.7 - 17.5 GM/DL [facility]

 

                Hematocrit      22.9 (L)        40.1 - 51.0 %   [facility]

 

                MCV             88.8            79.0 - 92.2 fL  [facility]

 

                MCH             29.1            25.7 - 32.2 pg  [facility]

 

                MCHC            32.8            32.3 - 36.5 GM/DL [facility]

 

                RDW             12.9            11.6 - 14.4 %   [facility]

 

                Platelets       160             150 - 450 K/CU MM [facility]

 

                MPV             11.0            9.4 - 12.4 fL   [facility]

 

                nRBC            0               0 - 0 /100 WBC  [facility]

 

                % Neutros       42              %               [facility]

[facility]

 

                % Lymphs        29              %               [facility]

[facility]

 

                % Monos         19              %               [facility]

[facility]

 

                % Eos           10              %               [facility]

 

                % Baso          0               %               [facility]

 

                # Neutros       2.00            1.78 - 5.38 K/L [facility]

 

                # Lymphs        1.36            1.32 - 3.57 K/L [facility]

 

                # Monos         0.88 (H)        0.30 - 0.82 K/L [facility]

 

                # Eos           0.48            0.04 - 0.54 K/L [facility]

 

                # Baso          0.02            0.01 - 0.08 K/L [facility]

 

                Immature Granulocytes-Relative 0               0 - 1 %         C

[facility]









                                        Specimen

 

                                        Blood









                Performing Organization Address         City/State/Zipcode Phone

 Number

 

                [facility] [address] [place]

 [address] [phone]



                CENTER                                          



CBC (Hemogram only) (2020 12:37 PM CDT)Only the most recent of9 results
within the time period is included.





                Component       Value           Ref Range       Performed At

 

                WBC             5.1             3.5 - 10.5 K/L [facility]

 

                RBC             2.58 (L)        4.63 - 6.08 M/L [facility]

 

                Hemoglobin      7.7 (L)         13.7 - 17.5 GM/DL [facility]

 

                Hematocrit      23.5 (L)        40.1 - 51.0 %   [facility]

 

                MCV             91.1            79.0 - 92.2 fL  [facility]

 

                MCH             29.8            25.7 - 32.2 pg  [facility]

 

                MCHC            32.8            32.3 - 36.5 GM/DL [facility]

 

                RDW             13.0            11.6 - 14.4 %   [facility]

 

                Platelets       137 (L)         150 - 450 K/CU MM [facility]

 

                MPV             10.8            9.4 - 12.4 fL   [facility]

 

                nRBC            0               0 - 0 /100 WBC  [facility]









                                        Specimen

 

                                        Blood









                Performing Organization Address         City/State/Zipcode Phone

 Number

 

                [facility] [address] [place]

 [address] [phone]



                CENTER                                          



Basic Metabolic Panel (2020  5:42 AM CDT)Only the most recent of2 results
within the time period is included.





                Component       Value           Ref Range       Performed At

 

                Sodium          134 (L)         136 - 145 meq/L [facility]

 

                Potassium       3.5             3.5 - 5.1 meq/L [facility]

 

                Chloride        105             98 - 107 meq/L  [facility]

 

                CO2             26              22 - 29 meq/L   [facility]

 

                BUN             7               7 - 21 mg/dL    [facility]

 

                Creatinine      0.68            0.57 - 1.25 mg/dL [facility]

 

                Glucose         108 (H)         70 - 105 mg/dL  [facility]

 

                Calcium         7.5 (L)         8.4 - 10.2 mg/dL [facility]

[facility]

 

                EGFR            115Comment: ESTIMATED GFR IS mL/min/1.73 sq m [facility]

I [facility]



                                NOT AS ACCURATE AS CREATININE                 ME

DICAL CENTER



                                CLEARANCE IN PREDICTING                 



                                GLOMERULAR FILTRATION RATE.                 



                                ESTIMATED GFR IS NOT                 



                                APPLICABLE FOR DIALYSIS                 



                                PATIENTS.                       









                                        Specimen

 

                                        Blood









                          Narrative                 Performed At

 

                           ID - PHYLLIS SOLOMON     [facility]

ICAL CENTER









                Performing Organization Address         City/[facility] Phone

 Number

 

                [facility] [address] [place]

 [address] [phone]



                CENTER                                          



Prepare Leuko-Red RBC (04/15/2020 11:54 PM CDT)





                Component       Value           Ref Range       Performed At

 

                CROSSMATCH      COMPATIBLE                      [facility]

 

                Unit ABO        A Neg                           [facility]

 

                UNIT NUMBER     [id number]                   [facility]

 

                Status          WORK IN PROGRESS                 [facility]

 

                Blood Bank Product RED BLOOD CELLS                 [facility]

 

                PRODUCT CODE    [id number]                        [facility]

 

                CROSSMATCH      COMPATIBLE                      [facility]

 

                Unit ABO        A Neg                           [facility]

 

                UNIT NUMBER     [id number]                   [facility]

 

                Status          TX_TIMEINCHART                  [facility]

 

                Blood Bank Product RED BLOOD CELLS                 [facility]

 

                PRODUCT CODE    [id number]                        [facility]

 

                CROSSMATCH      COMPATIBLE                      [facility]

 

                Unit ABO        A Neg                           [facility]

 

                UNIT NUMBER     [id number]                   [facility]

 

                Status          WORK IN PROGRESS                 [facility]

 

                Blood Bank Product RED BLOOD CELLS                 [facility]

 

                PRODUCT CODE    [id number]                        [facility]

 

                CROSSMATCH      COMPATIBLE                      [facility]

 

                Unit ABO        A Neg                           [facility]

 

                UNIT NUMBER     [id number]                   [facility]

 

                Status          WORK IN PROGRESS                 [facility]

 

                Blood Bank Product RED BLOOD CELLS                 [facility]

 

                PRODUCT CODE    [id number]                        [facility]









                                        Specimen

 

                                        Other









                Performing Organization Address         City/[facility] Phone

 Number

 

                [facility]                                    



POC-Glucose meter (04/15/2020  5:30 AM CDT)Only the most recent of4 results
within the time period is included.





                Component       Value           Ref Range       Performed At

 

                POC-Glucose Meter 112 (H)Comment: : TESTED 70 - 110 mg/dL  [facility]



                                AT [facility] [address] [facility]

NTER



                                [facility], [address]:                 



                                /Technician ID =                 



                                [id number] for ELISHA FRITZ                 









                                        Specimen

 

                                        Blood









                Performing Organization Address         City/[facility]/Zipcode Phone

 Number

 

                [facility] [address] [place]

 [address] [phone]



                CENTER                                          



REPORT OF PROCEDURE - ENDOSCOPY URL (2020 12:49 PM CDT)





                          Narrative                 Performed At

 

                                        This result has an attachment that is no

t available.



                                        



Transfuse Leuko-Red RBC (2020  7:19 AM CDT)Hemoglobin A1c (2020  
4:55 AM CDT)





                Component       Value           Ref Range       Performed At

 

                Hemoglobin A1C  5.5             4.3 - 6.1 %     [facility]









                                        Specimen

 

                                        Blood









                Performing Organization Address         City/[facility]/Zipcode Phone

 Number

 

                [address]

 [address] [phone]



                CENTER                                          



Blood Culture - Routine (Left Venipuncture) (2020  4:54 AM CDT)Only the 
most recent of2 resultswithin the time period is included.





                Component       Value           Ref Range       Performed At

 

                Result          No growth in 5 days                 [facility]









                                        Specimen

 

                                        Blood









                Performing Organization Address         City/[facility]ode Phone

 Number

 

                [address]

 [address] [phone]



                CENTER                                          



ABORH, manual (2020  3:11 AM CDT)





                Component       Value           Ref Range       Performed At

 

                ABO Grouping    A                               [facility]

 

                Rh Factor       NEG                             [facility]









                                        Specimen

 

                                        Blood









                Performing Organization Address         City/[facility] Cent[facility]ode Phone

 Number

 

                [address] [address] [phone]



Type and screen, automated (2020  2:52 AM CDT)





                Component       Value           Ref Range       Performed At

 

                ABO/RH AUTOMATED (BEAKER) A NEGATIVE                      [facility]

 

                Ab Scrn         NEGATIVE                        [facility]









                                        Specimen

 

                                        Blood









                Performing Organization Address         City/[facility] Cent[facility]ode Phone

 Number

 

                [address] [address] [phone]



Lactic acid, venous (2020  2:40 AM CDT)





                Component       Value           Ref Range       Performed At

 

                Lactate, Venous 1.75            0.50 - 2.20 mmol/L [facility]









                                        Specimen

 

                                        Blood









                          Narrative                 Performed At

 

                           ID - BS          [facility]

ICAL CENTER









                Performing Organization Address         City/[facility] Centercode Phone

 Number

 

                [address]

 [address] [phone]



                CENTER                                          



Prothrombin time/INR (2020  2:40 AM CDT)





                Component       Value           Ref Range       Performed At

 

                Protime         15.6 (H)        11.9 - 14.2 seconds [facility]

 

                INR             1.3             <=5.9           [facility]









                                        Specimen

 

                                        Blood









                          Narrative                 Performed At

 

                          Effective 2019: PT Reference Range [facility]



                                        Change



                                        



                                        New: 11.9-14.2Previous: 11.7-14.7



                                        



                                         



                                        



                          RECOMMENDED COUMADIN/WARFARIN INR THERAPY 



                                        RANGES



                                        



                          STANDARD DOSE: 2.0-3.0Includes: 



                          PROPHYLAXIS for venous thrombosis, systemic 



                          embolization; TREATMENT for venous thrombosis 



                                        and/or pulmonary embolus.



                                        



                          HIGH RISK: Target INR is 2.5-3.5 for patients 



                          wiht mechanical heart valves. 









                Performing Organization Address         City/State/Zipcode Phone

 Number

 

                [address]

 [address] [phone]



                CENTER                                          



Magnesium (2020  2:40 AM CDT)





                Component       Value           Ref Range       Performed At

 

                Magnesium       1.7             1.6 - 2.6 mg/dL [facility]









                                        Specimen

 

                                        Blood









                          Narrative                 Performed At

 

                           ID - BS          [facility]

ICAL CENTER









                Performing Organization Address         City/State/Zipcode Phone

 Number

 

                [address]

 [address] [phone]



                CENTER                                          



Comprehensive metabolic panel (2020  2:40 AM CDT)





                Component       Value           Ref Range       Performed At

 

                Protein, Total  4.0 (L)         6.0 - 8.3 gm/dL [facility]

ALTH



                                                                [facility]

ER

 

                Albumin         2.1 (L)         3.5 - 5.0 g/dL  [facility]

ALTH



                                                                [facility]

ER

 

                Alkaline Phosphatase 77              40 - 150 U/L    [facility]

ER

 

                Total Bilirubin 1.4 (H)         0.2 - 1.2 mg/dL [facility]

ALTH



                                                                [facility]

ER

 

                Sodium          131 (L)         136 - 145 meq/L [facility]

ALTH



                                                                [facility]

ER

 

                Potassium       4.8             3.5 - 5.1 meq/L [facility]

ALTH



                                                                [facility]

ER

 

                Chloride        106             98 - 107 meq/L  [facility]

ALTH



                                                                [facility]

ER

 

                CO2             21 (L)          22 - 29 meq/L   [facility]

ALTH



                                                                [facility]

ER

 

                BUN             18              7 - 21 mg/dL    [facility]

ALTH



                                                                [facility]

ER

 

                Creatinine      0.69            0.57 - 1.25 mg/dL [facility] 

HEALTH



                                                                [facility]

ER

 

                Glucose         209 (H)         70 - 105 mg/dL  [facility]

ALTH



                                                                [facility]

ER

 

                Calcium         7.0 (L)         8.4 - 10.2 mg/dL [facility]

EALTH



                                                                [facility]

ER

 

                AST             12              5 - 34 U/L      [facility]

ALTH



                                                                [facility]

ER

 

                ALT             12              6 - 55 U/L      [facility]

ALTH



                                                                [facility]

ER

 

                EGFR            113Comment: ESTIMATED mL/min/1.73 sq m [facility]  [facility]



                                GFR IS NOT AS ACCURATE                 [facility]



                                AS CREATININE CLEARANCE                 



                                IN PREDICTING GLOMERULAR                 



                                FILTRATION RATE.                 



                                ESTIMATED GFR IS NOT                 



                                APPLICABLE FOR DIALYSIS                 



                                PATIENTS.                       









                                        Specimen

 

                                        Blood









                          Narrative                 Performed At

 

                           ID - BS          [facility]









                Performing Organization Address         City/State/Zipcode Phone

 Number

 

                [facility] [address] [place]

 [address] [phone]



                CENTER                                          



XR chest 1 view portable / bedside (2020  2:28 AM CDT)





                                        Specimen

 

                                        









                          Narrative                 Performed At

 

                                        FINAL REPORT



                                        [facility]



                                         



                                        



                                        PATIENT ID: [id number]



                                        



                                         



                                        



                                         



                                        



                                        Chest one view.



                                        



                                         



                                        



                                        Clinical history: c/f aspiration



                                        



                                         



                                        



                                        Comparison: None.



                                        



                                         



                                        



                                        Technique: A single frontal view of the 

chest was obtained. 



                                        



                                         



                                        



                                        Findings:



                                        



                                        There is a right IJ Port-A-Cath with tip

 in the SVC.



                                        



                                        The heart is normal in size. The aorta i

s uncoiled. There are 



                                        



                                        bibasilar airspace opacities suspicious 

for pneumonia. There is no 



                                        



                                        pleural effusion or pneumothorax. The abdulaziz

ny thorax is unremarkable.



                                        



                                         



                                        



                                         



                                        



                                         



                                        



                                         



                                        



                                         



                                        



                                         



                                        



                                        Signed: Soo Waldrop MD



                                        



                                        Report Verified Date/Time:2020

 02:35:34



                                        



                                         



                                        



                          Electronically signed by: SOO WALDROP MD on 2020 



                                        02:35 AM



                                        



                                                    









                                        Procedure Note

 

                                        Interface, External Ris In - 2020 

 2:38 AM CDT



FINAL REPORT



                                        



                                        PATIENT ID:   [id number]



                                        



                                        



                                        Chest one view.



                                        



                                        Clinical history: c/f aspiration



                                        



                                        Comparison: None.



                                        



                                        Technique: A single frontal view of the 

chest was obtained.



                                        



                                        Findings:



                                        There is a right IJ Port-A-Cath with tip

 in the SVC.



                                        The heart is normal in size. The aorta i

s uncoiled. There are



                                        bibasilar airspace opacities suspicious 

for pneumonia. There is no



                                        pleural effusion or pneumothorax. The abdulaziz

ny thorax is unremarkable.



                                        



                                        



                                        



                                        



                                        



                                        



                                        Signed: Soo Waldrop MD



                                        Report Verified Date/Time:  2020 0

2:35:34



                                        



                                            Electronically signed by: SOO WALDROP MD on 2020 02:35 AM



                                        









                Performing Organization Address         City/State/Zipcode Phone

 Number

 

                [facility]                                          



REPORT OF PROCEDURE - ENDOSCOPY URL (2020 11:35 AM CDT)





                          Narrative                 Performed At

 

                                        This result has an attachment that is no

t available.



                                        



FL fluoro non-specific up to 1 hour (2020 11:30 AM CDT)





                                        Specimen

 

                                        









                          Narrative                 Performed At

 

                                        FINAL REPORT



                                        [facility]



                                         



                                        



                                        PATIENT ID: [id number]



                                        



                                         



                                        



                                        A fluoroscopic unit was utilized for a p

rocedure performed in the 



                                        



                                        operating room. No interpretation was re

quested. Please refer to the 



                                        



                                        operative report regarding findings. Ple

ase refer to PACS for patient 



                                        



                                        radiation dose information.



                                        



                                         



                                        



                                        Signed: JR Borrego Robert MD



                                        



                                        Report Verified Date/Time:2020

 12:58:42



                                        



                                         



                                        



                                        Reading Location: [facility]



                                        



                          Electronically signed by: ALFREDO KATZ

 on 2020 



                                        12:58 PM



                                        



                                                    









                                        Procedure Note

 

                                        Interface, External Ris In - 2020 

 1:00 PM CDT



FINAL REPORT



                                        



                                        PATIENT ID:   [id number]



                                        



                                        A fluoroscopic unit was utilized for a p

rocedure performed in the



                                        operating room. No interpretation was re

quested. Please refer to the



                                        operative report regarding findings. Ple

ase refer to PACS for patient



                                        radiation dose information.



                                        



                                        Signed: JR Borrego Robert MD



                                        Report Verified Date/Time:  2020 1

2:58:42



                                        



                                        Reading Location:  [facility]

y Reading Room



                                            Electronically signed by: ALFREDO BORREGO on 2020 12:58 PM



                                        









                Performing Organization Address         City/State/Zipcode Phone

 Number

 

                [place]                                          



after 2019



Insurance







           Payer      Benefit Plan / Subscriber ID Type       Phone      Address



                      Group                                       

 

           BLUE CROSS/BLUE BCBS HMO   xxxxxxxxxxxx HMO/POS    [phone] [id number]

X 238617







           SHIELD     BLUE/ESSENTIALS                                  [place]

X



                                                                  [id number]









           Guarantor Name Account Type Relation to Date of    Phone      Billing



                                 Patient    Birth                 Address

 

           [place]   Personal/Family Self       1950 [id number]

Megan Hicks                                  (Home)     [place] [address]







Advance Directives

For more information, please contact:[id number] [place], [place] [phone]





                Code Status     Date Activated  Date Inactivated Comments

 

                Full Code       2020  1:27 AM 2020 12:58 PM 









                    This code status was determined by: Patient

## 2020-07-04 NOTE — ER
Nurse's Notes                                                                                     

 [facility]                                                                 

Name: Megan Julio                                                                         

Age: 69 yrs                                                                                       

Sex: Male                                                                                         

: 1950                                                                                   

MRN: [id number]                                                                                   

Arrival Date: 2020                                                                          

Time: 15:04                                                                                       

Account#: [id number]                                                                            

Bed 5                                                                                             

Private MD: Ramu Harris T                                                                        

Diagnosis: Dysphagia;Esophageal obstruction                                                       

                                                                                                  

Presentation:                                                                                     

                                                                                             

15:30 Chief complaint: Spouse and/or significant other states: difficulty breathing and       dm5 

      difficulty swallowing. Pt has history of esophageal cancer and esophageal stents            

      placed. Coronavirus screen: Surgical mask placed on patient. Patient moved to private       

      room, placed in contact and droplet isolation with eye protection until further             

      assessment. Patient denies a cough. Patient reports shortness of breath or difficulty       

      breathing. Patient denies measured and/or subjective temperature greater than 100.4F        

      prior to today's visit. Patient denies travel on a cruise ship or to a country the [facility]      

      currently lists as an affected area. Patient denies contact with known and/or suspected     

      case of COVID-19. Ebola Screen: Patient negative for fever greater than or equal to         

      101.5 degrees Fahrenheit, and additional compatible Ebola Virus Disease symptoms            

      Patient denies exposure to infectious person. Patient denies travel to an                   

      Ebola-affected area in the 21 days before illness onset. No symptoms or risks               

      identified at this time. Risk Assessment: Do you want to hurt yourself or someone else?     

      Patient reports no desire to harm self or others. Note Oncologist (isis out to             

      Ty) asks that we call following evaluation [phone], GI doc is Gabbie              

      [phone], Wife [phone].                                                            

15:30 Method Of Arrival: Ambulatory                                                           dm5 

15:30 Acuity: BRETT 3                                                                           dm5 

16:24 Initial Sepsis Screen: Does the patient meet any 2 criteria? No. Patient's initial      ph  

      sepsis screen is negative. Does the patient have a suspected source of infection? No.       

      Patient's initial sepsis screen is negative. Onset of symptoms was 2020.           

                                                                                                  

Historical:                                                                                       

- Allergies:                                                                                      

16:01 Sulfa (Sulfonamide Antibiotics);                                                        ph  

- Home Meds:                                                                                      

16:01 aspirin 81 mg Oral susp 1 tab once daily [Active]; lisinopril Oral [Active]; Omeprazole ph  

      Oral [Active]; Tramadol Oral [Active]; Zofran Oral [Active];                                

- PMHx:                                                                                           

16:01 Cancer; ESOPHAGEAL WITH METS TO LYMPH NODES AND LIVER; CVA; Hypertension;               ph  

                                                                                                  

- Immunization history:: Adult Immunizations unknown.                                             

- Social history:: Patient/guardian denies using alcohol, street drugs, The patient               

  lives with family, Smoking status: Patient denies any tobacco usage or history of.              

- Family history:: not pertinent.                                                                 

                                                                                                  

                                                                                                  

Screenin:24 Abuse screen: Denies threats or abuse. Denies injuries from another. Nutritional        ph  

      screening: No deficits noted. Tuberculosis screening: No symptoms or risk factors           

      identified. Fall Risk None identified.                                                      

                                                                                                  

Assessment:                                                                                       

16:26 General: Appears in no apparent distress. comfortable, slender, well groomed, Behavior  ph  

      is calm, cooperative, appropriate for age, Denies fever. Pain: Complains of pain in         

      throat. Neuro: Level of Consciousness is awake, alert, obeys commands, Oriented to          

      person, place, time, situation. Neuro: Reports difficulty swallowing hx of esophageal       

      cancer. Cardiovascular: Reports fatigue, shortness of breath, Denies chest pain,            

      nausea, vomiting, Capillary refill < 3 seconds in bilateral fingers Patient's skin is       

      warm and dry. Respiratory: Reports shortness of breath at rest Airway is patent             

      Respiratory effort is even, unlabored, Respiratory pattern is regular, symmetrical. GI:     

      No signs and/or symptoms were reported involving the gastrointestinal system. Derm:         

      Skin is intact, is fragile, is thin, Skin is pink, warm \T\ dry. Musculoskeletal:           

      Circulation, motion, and sensation intact. Range of motion: intact in all extremities.      

17:45 Reassessment: Patient appears in no apparent distress at this time. Patient and/or      ph  

      family updated on plan of care and expected duration. Pain level reassessed. Patient is     

      alert, oriented x 3, equal unlabored respirations, skin warm/dry/pink.                      

18:25 Reassessment: Patient appears in no apparent distress at this time. Patient and/or      ph  

      family updated on plan of care and expected duration. Pain level reassessed. Patient is     

      alert, oriented x 3, equal unlabored respirations, skin warm/dry/pink. Report called to     

      Carmen SANCHEZ at [facility], pt requesting pudding which was provided, pt tolerating well ,     

      states, " I could only take a few bites but it went down okay." Awaiting EMS for            

      transport.                                                                                  

19:12 General: Appears in no apparent distress. comfortable, Behavior is calm, cooperative,   rr5 

      appropriate for age. Neuro: Level of Consciousness is awake, alert, obeys commands,         

      Oriented to person, place, time, situation. Cardiovascular: Capillary refill < 3            

      seconds Patient's skin is warm and dry. Respiratory: Airway is patent Respiratory           

      effort is even, unlabored, Respiratory pattern is regular, symmetrical. GI: GI: Reports     

      upper abdominal pain. : No signs and/or symptoms were reported regarding the              

      genitourinary system. EENT: Reports difficulty swallowing. Derm: Skin is intact, is         

      healthy with good turgor, Skin temperature is warm. Musculoskeletal: Circulation,           

      motion, and sensation intact. Capillary refill < 3 seconds.                                 

19:25 Reassessment: Patient appears in no apparent distress at this time. Patient is alert,   rr5 

      oriented x 3, equal unlabored respirations, skin warm/dry/pink. report given to [facility]        

      ambulance EMS awake alert GCS 15/15 ambulatory no complaints made. vitally stable. with     

      IV cannula intact ongoing fluid at 100 ml/hr infusing well.                                 

                                                                                                  

Vital Signs:                                                                                      

16:00  / 75; Pulse 65; Resp 18; Pulse Ox 97% on R/A;                                    ph  

16:25 Temp 97.9(TE);                                                                          ph  

16:39  / 80; Pulse 61; Resp 18; Pulse Ox 98% on R/A;                                    ph  

17:45  / 82; Pulse 56; Resp 18; Pulse Ox 98% on R/A;                                    ph  

18:46  / 65; Pulse 62; Resp 18; Temp 97.5; Pulse Ox 100% ;                              ph  

19:14  / 81; Pulse 65; Resp 19; Temp 98.2; Pulse Ox 99% ;                               rr5 

                                                                                                  

ED Course:                                                                                        

15:04 Patient arrived in ED.                                                                  mr  

15:05 Ramu Harris MD is Private Physician.                                                   mr  

15:24 Kenia Lira MD is Attending Physician.                                           ma2 

15:42 Triage completed.                                                                       dm5 

15:56 Alis Wasserman, DANIEL is Primary Nurse.                                                    ph  

16:00 Arm band placed on Patient placed in an exam room.                                      ph  

16:26 Initial lab(s) drawn, by me, sent to lab. Inserted saline lock: 22 gauge in left        ph  

      forearm, using aseptic technique. Blood collected.                                          

16:28 Patient has correct armband on for positive identification. Placed in gown. Bed in low  ph  

      position. Call light in reach. Side rails up X 1. Pulse ox on. NIBP on. Door closed.        

      Noise minimized. Lights dimmed. Warm blanket given. Pillow given.                           

19:06 No provider procedures requiring assistance completed. Patient transferred, IV remains  ph  

      in place.                                                                                   

                                                                                                  

Administered Medications:                                                                         

16:25 Drug: Dextrose 5 % in 1/2 Normal Saline with KCl 10 mEq/L 1000 ml Route: IV; Rate: 100  ph  

      ml/hr; Site: left forearm;                                                                  

19:07 Follow up: Response: No adverse reaction; IV Status: Infusion continued upon transfer   ph  

                                                                                                  

                                                                                                  

Outcome:                                                                                          

17:15 ER care complete, transfer ordered by MD. mckeon 

19:06 Transferred by ground EMS to [facility].                                    ph  

19:06 Condition: stable                                                                           

19:06 Instructed on the need for transfer.                                                        

19:28 Patient left the ED.                                                                    rr5 

                                                                                                  

Signatures:                                                                                       

Fernanda Ravi RN                    RN   dm5                                                  

Stephy Dove Patricia, RN RN ph Alzahri, Mohammad, MD MD   [facility]                                                  

George Fox RN                      RN   rr5                                                  

                                                                                                  

**************************************************************************************************

## 2020-08-01 ENCOUNTER — HOSPITAL ENCOUNTER (EMERGENCY)
Dept: HOSPITAL 97 - ER | Age: 70
Discharge: ANOTHER HOSPITAL | End: 2020-08-01
Payer: COMMERCIAL

## 2020-08-01 VITALS — DIASTOLIC BLOOD PRESSURE: 70 MMHG | SYSTOLIC BLOOD PRESSURE: 93 MMHG | TEMPERATURE: 97.4 F | OXYGEN SATURATION: 100 %

## 2020-08-01 DIAGNOSIS — C77.9: ICD-10-CM

## 2020-08-01 DIAGNOSIS — C15.9: ICD-10-CM

## 2020-08-01 DIAGNOSIS — K92.2: Primary | ICD-10-CM

## 2020-08-01 DIAGNOSIS — Z88.2: ICD-10-CM

## 2020-08-01 DIAGNOSIS — I10: ICD-10-CM

## 2020-08-01 DIAGNOSIS — C78.7: ICD-10-CM

## 2020-08-01 DIAGNOSIS — U07.1: ICD-10-CM

## 2020-08-01 LAB
ALBUMIN SERPL BCP-MCNC: 1.8 G/DL (ref 3.4–5)
ALP SERPL-CCNC: 79 U/L (ref 45–117)
ALT SERPL W P-5'-P-CCNC: 16 U/L (ref 12–78)
ANISOCYTOSIS BLD QL: (no result)
AST SERPL W P-5'-P-CCNC: 19 U/L (ref 15–37)
BLD SMEAR INTERP: (no result)
BUN BLD-MCNC: 28 MG/DL (ref 7–18)
CKMB CREATINE KINASE MB: < 1 NG/ML (ref 0.3–3.6)
GLUCOSE SERPLBLD-MCNC: 136 MG/DL (ref 74–106)
HCT VFR BLD CALC: 18.7 % (ref 39.6–49)
HYPOCHROMIA BLD QL: (no result)
INR BLD: 1.06
LIPASE SERPL-CCNC: 69 U/L (ref 73–393)
LYMPHOCYTES # SPEC AUTO: 1.3 K/UL (ref 0.7–4.9)
MAGNESIUM SERPL-MCNC: 1.6 MG/DL (ref 1.8–2.4)
MORPHOLOGY BLD-IMP: (no result)
PMV BLD: 9.7 FL (ref 7.6–11.3)
POTASSIUM SERPL-SCNC: 3.6 MMOL/L (ref 3.5–5.1)
RBC # BLD: 2.02 M/UL (ref 4.33–5.43)
TROPONIN (EMERG DEPT USE ONLY): 0.02 NG/ML (ref 0–0.04)

## 2020-08-01 PROCEDURE — 82553 CREATINE MB FRACTION: CPT

## 2020-08-01 PROCEDURE — 80048 BASIC METABOLIC PNL TOTAL CA: CPT

## 2020-08-01 PROCEDURE — 86900 BLOOD TYPING SEROLOGIC ABO: CPT

## 2020-08-01 PROCEDURE — 71045 X-RAY EXAM CHEST 1 VIEW: CPT

## 2020-08-01 PROCEDURE — 84484 ASSAY OF TROPONIN QUANT: CPT

## 2020-08-01 PROCEDURE — 80076 HEPATIC FUNCTION PANEL: CPT

## 2020-08-01 PROCEDURE — 83735 ASSAY OF MAGNESIUM: CPT

## 2020-08-01 PROCEDURE — 93005 ELECTROCARDIOGRAM TRACING: CPT

## 2020-08-01 PROCEDURE — 86901 BLOOD TYPING SEROLOGIC RH(D): CPT

## 2020-08-01 PROCEDURE — 85610 PROTHROMBIN TIME: CPT

## 2020-08-01 PROCEDURE — 85730 THROMBOPLASTIN TIME PARTIAL: CPT

## 2020-08-01 PROCEDURE — 96374 THER/PROPH/DIAG INJ IV PUSH: CPT

## 2020-08-01 PROCEDURE — 99285 EMERGENCY DEPT VISIT HI MDM: CPT

## 2020-08-01 PROCEDURE — 70450 CT HEAD/BRAIN W/O DYE: CPT

## 2020-08-01 PROCEDURE — 96361 HYDRATE IV INFUSION ADD-ON: CPT

## 2020-08-01 PROCEDURE — 36415 COLL VENOUS BLD VENIPUNCTURE: CPT

## 2020-08-01 PROCEDURE — 85025 COMPLETE CBC W/AUTO DIFF WBC: CPT

## 2020-08-01 PROCEDURE — 83690 ASSAY OF LIPASE: CPT

## 2020-08-01 PROCEDURE — 36430 TRANSFUSION BLD/BLD COMPNT: CPT

## 2020-08-01 PROCEDURE — 86850 RBC ANTIBODY SCREEN: CPT

## 2020-08-01 PROCEDURE — 82947 ASSAY GLUCOSE BLOOD QUANT: CPT

## 2020-08-01 PROCEDURE — 81003 URINALYSIS AUTO W/O SCOPE: CPT

## 2020-08-01 PROCEDURE — 82550 ASSAY OF CK (CPK): CPT

## 2020-08-01 NOTE — XMS REPORT
Clinical Summary

                            Created on:2020



Patient:Megan Avery

Sex:Male

:1950

External Reference #:[id number]





Demographics







                          Address                   [address] [place] [address]

 

                          Home Phone                [phone]

 

                          Mobile Phone              [phone]

 

                          Email Address             [email or web address]

 

                          Preferred Language        English

 

                          Marital Status            

 

                          Moravian Affiliation     Unknown

 

                          Race                      White

 

                          Ethnic Group              Not  or 









Author







                          Organization              [facility]

 

                          Address                   [address] [place] [address]









Support







                Name            Relationship    Address         Phone

 

                Josephine Avery Spouse          [address]  [phone] 



                                                [place] [address] 









Care Team Providers







                    Name                Role                Phone

 

                    Ramu Harris A       Primary Care Provider [phone]









Allergies







             Active Allergy Reactions    Severity     Noted Date   Comments

 

             Sulfa (Sulfonamide Other (See Comments)              2020   F

judy like symptoms



             Antibiotics)                                        







Medications







          Medication Sig       Dispensed Refills   Start     End Date  Status



                                                  Date                

 

          aspirin (ECOTRIN) 81 Take 81 mg           0                           

  Active



          MG enteric coated by mouth                                          



          tablet    daily.                                            

 

          pantoprazole Take 40 mg           0                    Active



          (PROTONIX) 40 MG EC by mouth 2                     0                  

 



          tablet    (two) times                                         



                    a day.                                            

 

          ondansetron ODT Take 8 mg by           0                             A

ctive



          (ZOFRAN-ODT) 8 MG mouth 2                                           



          disintegrating tablet (two) times                                     

    



                    a day.                                            

 

          traMADoL (ULTRAM) 50 Take 50 mg           0                  

  Active



          mg tablet by mouth.                     0                   

 

          HYDROcodone-acetamino chronic   100 tablet 0         

0  Active



          phen (Norco)  pain. 1 tab                     0         20      

  



          mg per    po q4h prn                                         



          tabletIndications: moderate                                          



          chronic pain pain                                              

 

          lisinopriL (PRINIVIL)                     0         20

  Discontinued



          5 mg tablet                               0         20        

 

          prochlorperazine Take 1    30 tablet 5         20  Exp

ired



          (Compazine) 5 MG tablet (5 mg                     0         20        



          tabletIndications: GE total) by                                       

  



          junction carcinoma mouth every                                        

 



          (HCC)     6 (six)                                           



                    hours as                                          



                    needed for                                         



                    nausea or                                         



                    vomiting for                                         



                    up to 30                                          



                    days.                                             

 

          ondansetron ODT Take 1    30 tablet 5         20  Expi

red



          (Zofran ODT) 8 MG tablet (8 mg                     0         20       

 



          disintegrating total) by                                         



          tabletIndications: GE mouth every                                     

    



          junction carcinoma 8 (eight)                                         



          (HCC)     hours as                                          



                    needed for                                         



                    nausea or                                         



                    vomiting for                                         



                    up to 30                                          



                    days.                                             

 

          dronabinoL (Marinol) Take 1    20 capsule 0         20

  



          2.5 MG capsule capsule (2.5                     0         20        



                    mg total) by                                         



                    mouth 2                                           



                    (two) times                                         



                    a day before                                         



                    meals for 10                                         



                    days.                                             

 

          HYDROcodone-acetamino Take 1    100 tablet 0         

0  Discontinued



          phen (Norco)  tablet by                     0         20        

(Reorder)



          mg per    mouth every                                         



          tabletIndications: 4 (four)                                          



          chronic pain hours as                                          



                    needed for                                         



                    moderate                                          



                    pain for up                                         



                    to 100 doses                                         



                    .chronic                                          



                    pain. Code:                                         



                    C16.0 Max                                         



                    Daily                                             



                    Amount: 6                                         



                    tablets                                           

 

          HYDROcodone-acetamino Take 1    100 tablet 0         

0  Discontinued



          phen (Norco)  tablet by                     0         20        

(Reorder)



          mg per    mouth every                                         



          tabletIndications: 4 (four)                                          



          chronic pain hours as                                          



                    needed for                                         



                    moderate                                          



                    pain for up                                         



                    to 100 doses                                         



                    .chronic                                          



                    pain. Code:                                         



                    C16.0 Max                                         



                    Daily                                             



                    Amount: 6                                         



                    tablets                                           

 

          HYDROcodone-acetamino chronic pain. Code: C16.0 100 tablet 0         0

20  

Discontinued



          phen (Norco)  1 tab po q4h prn moderate pain                    

 0         20        (Reorder)



          mg per                                                      



          tabletIndications:                                                   



          chronic pain                                                   







Active Problems







                          Problem                   Noted Date

 

                          Dysphagia                 2020

 

                          Esophageal dysphagia      2020









                                        Overview: 







                                        Added automatically from request for usman

melissa [id number]









                          Hypotension due to drugs  2020

 

                          Dehydration               2020

 

                          GE junction carcinoma     2020

 

                          Liver metastases          2020







Resolved Problems







                    Problem             Noted Date          Resolved Date

 

                    Gastrointestinal hemorrhage associated with gastric ulcer 2020

 

                    Esophageal dysphagia 2020







Encounters







             Date         Type         Specialty    Care Team    Description

 

             2020   Orders Only  Oncology     Juju Montero RPH 

 

             2020   Oncology     Oncology     Champagne,   



                          Zoe Marroquin RN 

 

             2020   Orders Only  Oncology     Matt Mcgregor MD   

 

             2020   Travel                                 

 

             2020   Oncology     Oncology     ChampagneZoe, DANIEL 

 

             2020   Telephone    Oncology     Matt Mcgregor  COVID-19 (Prima

ry Dx);



                                                    MD Jose   GE junction car

cinoma (HCC)

 

             2020   Nurse Only   Oncology     Matt Mcgregor  GE junction car

sophiaoma



                                                    MD Jose   (HCC) (Primary 

Dx)

 

             2020   Travel                                 

 

             2020   Oncology     Oncology     ChampagnZoe jenkins, DANIEL 

 

             2020   Oncology     Oncology     Champagne   



                          [facility]              Cara, DANIEL 

 

             2020   Telephone    Cardiology   Radhames, Follow-up



                                                    DANIEL Hamilton    

 

             2020   Infusion     Oncology     Matt Mcgregor  GE junction car

cinoma (HCC) (Primary 

Dx);



                                                    MD Jose   Esophageal dysp

hagia

 

             2020   Oncology     Oncology     Champagne,   



                          Zoe Marroquin, DANIEL 

 

             2020   Orders Only  Oncology     Dayron Renae RN        

 

             2020   Travel                                 

 

             07/15/2020   Orders Only  Oncology     Savannah Bolivar, [facility]  

 

             07/15/2020   Oncology     Oncology     ChampagneZoe, RN 

 

             2020   Oncology     Oncology     Champagne,   



                          [facility]              Cara, DANIEL 

 

             2020   Travel                                 

 

             2020   Infusion     Oncology     Matt Mcgregor  GE junction car

cinoma (HCC) (Primary 

Dx);



                                                    MD Joes   Esophageal dysp

hagia

 

             2020   Oncology     Oncology     Champagne,   



                          Zoe Marroquin, RN 

 

             2020   Orders Only  Oncology     Navid Sharma,     GE junction car

cinoma



                                                    Martita, [facility] (HCC) (Primary 

Dx)

 

             2020   Travel                                 

 

             07/10/2020   Oncology     Oncology     ChampagneZoe, RN 

 

             2020   Travel                                 

 

             2020   Travel                                 

 

             2020   Orders Only  Oncology     Kianna Starr, GE junction 

carcinoma (HCC) 

(Primary Dx);



                                                    MA           Liver metastase

s (HCC);



                                                                 Esophageal dysp

hagia

 

             2020   Surgery      Gastroenterology Kacie Cartwright W/ DEXTER MANN MD       

 

             2020   Anesthesia Event Gastroenterology [facility],     



                                                    Bradford Rodriguez MD    

 

             2020   Oncology     Oncology     Champagne,   



                          Zoe Marroquin, RN 

 

             2020   Hospital Encounter Oncology     Sal Raphael Esophagalice

al dysphagia 

(Primary Dx);



                -                                               MD



                                        Malignant neoplasm of esophagus, unspeci

fied (HCC)



             2020                             Kunal Maloney MD     

 

             2020   Intake       Access                    

 

             2020   Travel                                 

 

             2020   Intake       Access                    

 

             2020   Infusion     Oncology     Nikolas Mcgregork  GE junction car

bob Garcia MD   (HCC) (Primary 

Dx)

 

             2020   Office Visit Oncology     Matt Mcgregor  GE junction car

cinoma (HCC) 

(Primary Dx);



                                                    MD Jose   Liver metastase

s (HCC)

 

             2020   Travel                                 

 

             2020   Orders Only  Oncology     Nikolas Mcgregork  GE junction car

sophiaoma



                                                    MD Jose   (HCC) (Primary 

Dx)

 

             2020   Oncology     Oncology     Champagne,   



                          [facility]              Cara, DANIEL 

 

             2020   Infusion     Oncology     Nikolas Mcgregork  GE junction car

cinoma (HCC) (Primary 

Dx);



                                                    MD Jose   Liver metastase

s (HCC)

 

             2020   Orders Only  Oncology     Jazlyn Wood RN     

 

             2020   Orders Only  Oncology     Savannah Bolivar, [facility]  

 

             2020   Oncology     Oncology     Champagne   



                          Survivorship              DANIEL Marroquin 

 

             2020   Travel                                 

 

             2020   Orders Only  Pharmacy     Khadijah Tejada, 



                                                    [facility]          

 

             2020   Transcribe Orders Access       Kacie Cartwright Malignant 

neoplasm of 

esophagus, unspecified (HCC) (Primary Dx);



                                                    MD JOHNATHAN       Dysphagia, unsp

ecified;



                                                                 Pre-operative c

learance

 

             2020   Oncology     Oncology     Champagne,   



                          Zoe Marroquin, DANIEL 

 

             2020   Telephone    Rehabilitation Genet Davila 



                                                    [facility]        

 

             2020   Nurse Only   Oncology     Nikolas Mcgregork  GE junction car

cinoma (HCC) (Primary 

Dx);



                                                    MD Jose   Dehydration

 

             2020   Orders Only  Oncology     Martita Ji, [facility] 

 

             2020   Travel                                 

 

             2020   Oncology     Oncology     Champagne   



                          [facility]              Cara, DANIEL 

 

             2020   Orders Only  Oncology     Jazlyn Wood, DANIEL     

 

             2020   Oncology     Oncology     Champagne,   



                          Survivorship              Cara, DANIEL 

 

             06/15/2020   Infusion     Oncology     Matt Mcgregor  GE junction car

bob Garcia MD   (HCC) (Primary 

Dx)

 

             06/15/2020   Oncology     Oncology     Champagne   



                          [facility]              Cara, DANIEL 

 

             06/15/2020   Travel                                 

 

             2020   Orders Only  Pharmacy     Khadijah Tejada 



                                                    [facility]          

 

             06/10/2020   Hospital Encounter Radiology    Kacie Cartwright Abnormal 

findings on



                                                    M., MD       diagnostic imag

ing of



                                                                 other parts of



                                                                 digestive tract

 

             06/10/2020   Surgery      Gastroenterology Kacie Cartwright EGD W/ DEXTER RIOS W/



                                                    MD LACHO MANN

 

                06/10/2020      Anesthesia Event Gastroenterology Lisset Mullins MD Crawley, Teri, 



                                                    PAT           

 

             06/10/2020   Hospital Encounter Gastroenterology Kacie Cartwright MD       

 

             2020   Travel                                 

 

             2020   Travel                                 

 

             2020   Telephone    Oncology     Matt Mcgregor MD   

 

             2020   Oncology     Oncology     Champagne,   



                          [facility]              Cara, DANIEL 

 

             2020   Telephone    Oncology     Wood, Lotti GE junction car

cinoma (HCC) (Primary 

Dx);



                                                    DANIEL Smith     Dysphagia, unsp

ecified type

 

             2020   Orders Only  Oncology     Israel, Lotti GE junction car

sophiaoma



                                                    DANIEL Smith     (HCC) (Primary 

Dx)

 

             2020   Oncology     Oncology     Champagne,   



                          [facility]              Cara, DANIEL 

 

             2020   Nurse Only   Oncology     Nikolas Mcgregork  GE junction car

sophiaoma



                                                    MD Jose   (HCC) (Primary 

Dx)

 

             2020   Travel                                 

 

             2020   Orders Only  Oncology     Payal Conde RN        

 

             2020   Office Visit Oncology     Matt Mcgregor  GE junction car

cinoma (HCC) 

(Primary Dx);



                                                    MD Jose   Liver metastase

s (HCC)

 

             2020   Infusion     Oncology     Matt Mcgregor  GE junction car

cinoma (HCC) (Primary 

Dx);



                                                    MD Jose   Liver metastase

s (HCC)

 

             2020   Hospital Encounter Radiology    Matt Mcgregor juncti

on carcinoma (HCC);



                                                    MD Jose   Liver metastase

s (HCC)

 

             2020   Oncology     Oncology     Champagne   



                          [facility]              Cara, DANIEL 

 

             2020   Travel                                 

 

             05/15/2020   Office Visit Cardiology   [place],      Palpitations (P

rimary Dx);



                                                    Galina    GE junction car

cinoma (HCC)



                                                    PAT Dill    

 

             05/15/2020   Nurse Only   Oncology     Matt Mcgregor  GE junction car

bob Garcia MD   (HCC) (Primary 

Dx)

 

             05/15/2020   Travel                                 

 

             2020   Infusion     Oncology     Matt Mcgregor  GE junction car

bob Garcia MD   (HCC) (Primary 

Dx)

 

             2020   Oncology     Oncology     Zoe Jovel RN 

 

             2020   Travel                                 

 

             2020   Orders Only  Pharmacy     Khadijah Tejada 



                                                    [facility]          

 

             2020   Telephone    Oncology     Matt Mcgregor MD   

 

             2020   Travel                                 

 

             2020   Telephone    Oncology     Jazlyn Wood GE junction ginny Smith RN     (HCC) (Primary 

Dx)

 

             2020   Orders Only  Oncology     Jazlyn Wood RN     

 

             2020   Nurse Only   Oncology     Matt Mcgregor  GE junction ginny Garcia MD   (HCC) (Primary 

Dx)

 

             2020   Hospital Encounter Procedural Cardiology Matt Mcgregor  

Encounter for



                                                    MD Jose   antineoplastic



                                                                 chemotherapy

 

             2020   Travel                                 

 

             2020   Orders Only  Oncology     Yoanna Griggs, GE junction 

carcinoma (HCC) 

(Primary Dx);



                                                    MA           Liver metastase

s (HCC)

 

             2020   Telephone    Oncology     Matt Mcgregor MD   

 

             2020   Telephone    Oncology     Jazlyn Wood RN     

 

             2020   Travel                                 

 

             2020   Documentation Oncology     Cara Jovel RN 

 

             2020   Documentation Oncology     Cara Jovel RN 

 

             2020   Office Visit Oncology     Matt Mcgregor  Gastrointestina

l hemorrhage 

associated with gastric ulcer (Primary Dx);



                                                    MD Jose   GE junction car

cinoma (HCC);



                                                                 Liver metastase

s (HCC);



                                                                 Hypotension due

 to drugs;



                                                                 Esophageal dysp

hagia

 

             2020   Infusion     Oncology     Matt Mcgregor  GE junction ginny Garcia MD   (HCC) (Primary 

Dx)

 

             2020   Oncology     Oncology     Zoe Jovel RN 

 

             2020   Orders Only  Oncology     Yoanna Griggs, Encounter fo

r



                                                    MA           antineoplastic



                                                                 chemotherapy (P

rimary



                                                                 Dx)

 

             2020   Telephone    Oncology     Matt Mcgregor MD   

 

             2020   Telephone    Oncology     Cara Jovel RN 

 

             2020   Orders Only  Pharmacy     Khadijah Tejada, 



                                                    [facility]          

 

             2020   Travel                                 

 

             2020   Documentation Oncology     Cara Jovel RN 

 

             2020   Telephone    Oncology     Matt Mcgregor MD   

 

             2020   Telephone Consult Oncology     Matt Mcgregor junctio

n carcinoma (HCC) 

(Primary Dx);



                                                    MD Jose   Esophageal dysp

hagia;



                                                                 Liver metastase

s (HCC)

 

             2020   Orders Only  Oncology     Yoanna Griggs, Encounter fo

r



                                                    MA           antineoplastic



                                                                 chemotherapy (P

rimary



                                                                 Dx)

 

             2020   Telephone    Oncology     Cara Jovel, DANIEL 

 

             2020   Orders Only  Oncology     Martita Ji, [facility] 

 

             2020   Orders Only  Oncology     Jazlyn Wood RN     

 

             2020   Telephone    Oncology     Deya Gordillo RD   

 

             2020   Telephone    Oncology     Jazlyn Wood RN     

 

             04/15/2020   Telephone    Oncology     Cara Jovel, DANIEL 

 

             2020   Telephone    Oncology     Yoanna Griggs, 



                                                    [place]           

 

             2020   Telephone    Oncology     Matt Mcgregor MD   

 

             2020   Travel                                 

 

             2020   Documentation Oncology     Uche Santos RN  

 

             2020   Telephone    Oncology     Cara Jovel RN 

 

             2020   Orders Only  Oncology     Yoanna Griggs, Encounter fo

r antineoplastic 

chemotherapy (Primary Dx);



                                                    MA           GE junction car

cinoma (HCC)

 

             2020   Orders Only  Oncology     Matt Mcgregor MD   

 

             2020   Orders Only  Oncology     Martita Ji, [facility] 

 

             2020   Orders Only  Oncology     Jazlyn Wood, DANIEL     

 

             2020   Telephone    Oncology     Matt Mcgregor MD   

 

             2020   Telephone    Oncology     Cara Jovel, DANIEL 

 

             2020   Telephone    Oncology     Deya Gordillo, MIKEY   

 

             2020   Orders Only  Oncology     Jazlyn Wood, DANIEL     

 

             2020   Telephone    Oncology     Jazlyn Wood RN     

 

             2020   Telephone    Oncology     Deya Gordillo, MIKEY   

 

             2020   Telephone    Oncology     Cara Jovel, DANIEL 

 

             2020   Telephone    Oncology     Cara Jovel, DANIEL 

 

             2020   Nurse Only   Oncology     Matt Mcgregor  [facility] junction car

cinoma



                                                    MD Jose   (HCC) (Primary 

Dx)

 

             2020   Travel                                 

 

             2020   Telephone    Oncology     Cara Jovel, DANIEL 

 

             2020   Social Work  Oncology     Cara Rendon ARELIS 

 

             2020   Infusion     Oncology     Matt Mcgregor  GE junction ginny Garcia MD   (HCC) (Primary 

Dx)

 

             2020   Documentation Oncology     Martita Ji, [facility] 

 

             2020   Telephone    Oncology     Matt Mcgregor MD   

 

             2020   Telephone    Oncology     Matt Mcgregor MD   

 

             2020   Telephone    Oncology     Matt Mcgregor MD   

 

             2020   Travel                                 

 

             2020   Hospital Encounter Radiology    Matt Mcgregor  Malignant

 neoplasm of 

esophagus, unspecified location (HCC);



                                                    MD Jose   Pre-op testing

 

             2020   Telephone    Oncology     Matt Mcgregor MD   

 

             2020   Telephone    Oncology     Jazlyn Wood RN     

 

             2020   Telephone    Oncology     Matt Mcgregor MD   

 

             2020   Telephone    Oncology     Matt Mcgregor MD   

 

             2020   Travel                                 

 

             2020   Telephone    Oncology     Matt Mcgregor MD   

 

             2020   Orders Only  Oncology     Matt Mcgregor  GE junction ginny Garcia MD   (HCC) (Primary 

Dx)

 

             2020   Telephone    Radiology    Ashlee Shields, DANIEL    

 

             2020   Oncology     Oncology     Champagne,   



                          Survivorship              Cara, DANIEL 

 

             2020   Documentation Medical Records Provider,    



                                                    Unknown      

 

             2020   Orders Only  Oncology     Martita Ji, [facility] 

 

             2020   Orders Only  Oncology     Navid Sharma     [facility] car

bob Anders, [facility] (HCC) (Primary 

Dx)

 

             2020   Hospital Encounter Radiology    Matt Mcgregor MD   

 

             2020   Hospital Encounter Radiology    Matt Mcgregor MD   

 

             2020   Hospital Encounter Radiology    Matt Mcgregor  Malignant

 neoplasm of



                                                    MD Jose   esophagus,



                                                                 unspecified loc

ation



                                                                 (HCC)

 

             2020   Lab          Lab          Matt Mcgregor  Malignant neopl

asm of esophagus, unspecified 

location (HCC);



                                                    MD Jose   Pre-op testing

 

             2020   Consult      Oncology     Matt Mcgregor  Liver metastase

s (HCC) (Primary Dx);



                                                    MD Jose   [facility] junction car

cinoma (HCC)

 

             2020   Orders Only  Oncology     Yoanna Griggs, Malignant ne

oplasm of



                                                    MA           esophagus,



                                                                 unspecified loc

ation



                                                                 (HCC) (Primary 

Dx)

 

             2020   Orders Only  Oncology     Rideau, Yoanna, Malignant ne

oplasm of esophagus,

unspecified location (HCC) (Primary Dx);



                                                    MA           Pre-op testing

 

             2020   Travel                                 

 

             2020   Telephone    Oncology     Matt Mcgregor MD   

 

             2020   Office Visit General Surgery Patrick Siddiqui Malignant

 neoplasm of



                                                    R., MD       esophagus,



                                                                 unspecified loc

ation



                                                                 (HCC) (Primary 

Dx)

 

             2020   Travel                                 

 

             2020   Travel                                 



after 2019



Family History







                Medical History Relation        Name            Comments

 

                Heart attack    Father                          

 

                Breast cancer   Mother                          









                Relation        Name            Status          Comments

 

                Father                                          

 

                Mother                                          







Social History







             Tobacco Use  Types        Packs/Day    Years Used   Date

 

             Former Smoker Cigars       1                         Quit: 2020









                Smokeless Tobacco: Never Used                                 









                Alcohol Use     Drinks/Week     oz/Week         Comments

 

                Never                                           









                    Alcohol Habits      Answer              Date Recorded

 

                    How often do you have a drink containing alcohol? Never     

          2020

 

                    How many drinks containing alcohol do you have on a typical 

Not asked           



                    day when you are drinking?                     

 

                    How often do you have six or more drinks on one occasion? No

t asked           









                          Sex Assigned at Birth     Date Recorded

 

                          Not on file               









                    Job Start Date      Occupation          Industry

 

                    Not on file         Not on file         Not on file









                    Travel History      Travel Start        Travel End









                                        No recent travel history available.









                    COVID-19 Exposure   Response            Date Recorded

 

                    In the last month, have you been in contact with Yes        

         2020 11:27 AM CDT



                    someone who was confirmed or suspected to have              

       



                    Coronavirus / COVID-19?                     







Last Filed Vital Signs







                Vital Sign      Reading         Time Taken      Comments

 

                Blood Pressure  99/59           2020  2:09 PM CDT 

 

                Pulse           73              2020  2:09 PM CDT 

 

                Temperature     35.8 C (96.4 F) 2020  2:09 PM CDT 

 

                Respiratory Rate 17              2020  2:09 PM CDT 

 

                Oxygen Saturation 98%             2020  2:09 PM CDT 

 

                Inhaled Oxygen Concentration -               -               

 

                Weight          66.8 kg (147 lb 3.2 oz) 2020  2:09 PM CDT 

 

                Height          185.4 cm (6' 1") 2020  2:09 PM CDT 

 

                Body Mass Index 19.42           2020  2:09 PM CDT 







Plan of Treatment







             Date         Type         Specialty    Care Team    Description

 

                2020      Appointment     Radiology       Matt Mcgregor rd, MD



                                        



                                                                [address] [place] [address]

0



                                        



                                                                [phone]



                                        



                                                    [phone] (Fax) 

 

                2020      Infusion        Oncology        Matt Mcgregor rd, MD



                                        



                                                                [address] [place] [address]

0



                                        



                                                                [phone]



                                        



                                                    [phone] (Fax) 

 

                2020      Office Visit    Oncology        Matt Mcgregor rd, MD



                                        



                                                                [address] [place] [address]

0



                                        



                                                                [phone]



                                        



                                                    [phone] (Fax) 

 

                2020      Nurse Only      Oncology        Matt Mcgregor rd, MD



                                        



                                                                [address] [place] [address]

0



                                        



                                                                [phone]



                                        



                                                    [phone] (Fax) 

 

                2020      Infusion        Oncology        Matt Mcgregor rd, MD



                                        



                                                                [address] [place] [address]

0



                                        



                                                                [phone]



                                        



                                                    [phone] (Fax) 

 

                2020      Nurse Only      Oncology        Matt Mcgregor rd, MD



                                        



                                                                [address] [place] [address]

0



                                        



                                                                [phone]



                                        



                                                    [phone] (Fax) 

 

                2020      Office Visit    Cardiology      Howie Robbins MD



                                        



                                                                [address] [facility]

et



                                        



                                                                [address]



                                        



                                                                [place], [place] [address]

0



                                        



                                                                [phone]



                                        



                                                    [phone] (Fax) 

 

                2020      Infusion        Oncology        Matt Mcgregor rd, MD



                                        



                                                                [address] [place] [address]

0



                                        



                                                                [phone]



                                        



                                                    [phone] (Fax) 

 

                2020      Nurse Only      Oncology        Matt Mcgregor rd, MD



                                        



                                                                [address] [place] [address]

0



                                        



                                                                [phone]



                                        



                                                    [phone] (Fax) 









                Health Maintenance Due Date        Last Done       Comments

 

                COLONOSCOPY SCREENING 1998                      

 

                SHINGLES VACCINES (#1) 1998                      

 

                65+ PNEUMOCOCCAL VACCINE (1 of 2 - PCV13) 2013            

          

 

                INFLUENZA VACCINE 2020                      







Implants







          Implanted Type      Area       Device    Shelf     Model /



                                                  Identifier Expiration Serial /



                                                            Date      Lot

 

                          Port Imlpntbl Smart Port W/ Dtchd 0.4ml 6.6fr 55cm W/ 

Sheath - [id number] 

Implantable  N/A:         [employer]              2022   [id number]

1 HOUS /



                Implanted: 2020 at [facility] (Quantity not on file) Inf

usion Ports  N/A             

INC                                                          /



                    or Accessories                                         [id number]

21

 

          Stent     Stent                                             









                                        Description:digestive









                Stent Espgl Wallflex 10cm 23mm Flcvrd - [id number] Surgical Sten

ts N/A: N/A        [facility] 

ENDOSCOPY                               2021          [id number] /



          Implanted: 06/10/2020 at [facility] (Quantity not on file)          

                                          /



                                                                      [id number]







Procedures







             Procedure Name Priority     Date/Time    Associated   Comments



                                                    Diagnosis    

 

             SMEAR REVIEW STAT         2020                Results for



                                       9:34 AM CDT               this



                                                                 procedure are



                                                                 in the



                                                                 results



                                                                 section.

 

             ESTIMATED GFR STAT         2020                Results for



                                       9:34 AM CDT               this



                                                                 procedure are



                                                                 in the



                                                                 results



                                                                 section.

 

             MAGNESIUM LEVEL STAT         2020   GE junction  Results for



                                       9:34 AM CDT  carcinoma (HCC) this



                                                                 procedure are



                                                                 in the



                                                                 results



                                                                 section.

 

             HC COMPLETE BLD COUNT W/AUTO STAT         2020   GE junction 

 Results for



             DIFF                      9:34 AM CDT  carcinoma (HCC) this



                                                                 procedure are



                                                                 in the



                                                                 results



                                                                 section.

 

             COMPREHENSIVE METABOLIC PANEL STAT         2020   GE junction

  Results for



                                       9:34 AM CDT  carcinoma (HCC) this



                                                                 procedure are



                                                                 in the



                                                                 results



                                                                 section.

 

             SMEAR REVIEW STAT         2020                Results for



                                       9:40 AM CDT               this



                                                                 procedure are



                                                                 in the



                                                                 results



                                                                 section.

 

             ESTIMATED GFR STAT         2020                Results for



                                       9:40 AM CDT               this



                                                                 procedure are



                                                                 in the



                                                                 results



                                                                 section.

 

             MAGNESIUM LEVEL STAT         2020   GE junction  Results for



                                       9:40 AM CDT  carcinoma (HCC) this



                                                                 procedure are



                                                                 in the



                                                                 results



                                                                 section.

 

             COMPREHENSIVE METABOLIC PANEL STAT         2020   GE junction

  Results for



                                       9:40 AM CDT  carcinoma (HCC) this



                                                                 procedure are



                                                                 in the



                                                                 results



                                                                 section.

 

             HC COMPLETE BLD COUNT W/AUTO STAT         2020   GE junction 

 Results for



             DIFF                      9:40 AM CDT  carcinoma (HCC) this



                                                                 procedure are



                                                                 in the



                                                                 results



                                                                 section.

 

             CV CARDIAC EVENT MONITOR Routine      2020                

 

             ESTIMATED GFR Routine      2020                Results for



                                       4:27 AM CDT               this



                                                                 procedure are



                                                                 in the



                                                                 results



                                                                 section.

 

             HC COMPLETE BLD COUNT W/AUTO Routine      2020               

 Results for



             DIFF                      4:27 AM CDT               this



                                                                 procedure are



                                                                 in the



                                                                 results



                                                                 section.

 

             BASIC METABOLIC PANEL Routine      2020                Result

s for



                                       4:27 AM CDT               this



                                                                 procedure are



                                                                 in the



                                                                 results



                                                                 section.

 

             FL < 1 HOUR  Routine      2020   Malignant neoplasm Results f

or



                                       11:34 AM CDT of esophagus, this



                                                    unspecified (HCC) procedure 

are



                                                                 in the



                                                                 results



                                                                 section.

 

             MN AN ELECTIVE ENDOTRACHEAL Routine      2020                

Results for



             AIRWAY                    11:00 AM CDT              this



                                                                 procedure are



                                                                 in the



                                                                 results



                                                                 section.

 

             ESOPHAGOGASTRODUODENOSCOPY              2020   Esophageal   



             (EGD)                     10:30 AM CDT dysphagia    

 

             ESTIMATED GFR Routine      2020                Results for



                                       3:54 AM CDT               this



                                                                 procedure are



                                                                 in the



                                                                 results



                                                                 section.

 

             THYROID STIMULATING HORMONE Routine      2020                

Results for



                                       3:54 AM CDT               this



                                                                 procedure are



                                                                 in the



                                                                 results



                                                                 section.

 

             T4, FREE     Routine      2020                Results for



                                       3:54 AM CDT               this



                                                                 procedure are



                                                                 in the



                                                                 results



                                                                 section.

 

             PHOSPHORUS LEVEL Routine      2020                Results for



                                       3:54 AM CDT               this



                                                                 procedure are



                                                                 in the



                                                                 results



                                                                 section.

 

             MAGNESIUM LEVEL Routine      2020                Results for



                                       3:54 AM CDT               this



                                                                 procedure are



                                                                 in the



                                                                 results



                                                                 section.

 

             BASIC METABOLIC PANEL Routine      2020                Result

s for



                                       3:54 AM CDT               this



                                                                 procedure are



                                                                 in the



                                                                 results



                                                                 section.

 

             HC COMPLETE BLD COUNT W/AUTO Routine      2020               

 Results for



             DIFF                      3:54 AM CDT               this



                                                                 procedure are



                                                                 in the



                                                                 results



                                                                 section.

 

             ESTIMATED GFR Routine      2020                Results for



                                       4:30 AM CDT               this



                                                                 procedure are



                                                                 in the



                                                                 results



                                                                 section.

 

             PHOSPHORUS LEVEL Routine      2020                Results for



                                       4:30 AM CDT               this



                                                                 procedure are



                                                                 in the



                                                                 results



                                                                 section.

 

             MAGNESIUM LEVEL Routine      2020                Results for



                                       4:30 AM CDT               this



                                                                 procedure are



                                                                 in the



                                                                 results



                                                                 section.

 

             COMPREHENSIVE METABOLIC PANEL Routine      2020              

  Results for



                                       4:30 AM CDT               this



                                                                 procedure are



                                                                 in the



                                                                 results



                                                                 section.

 

             HC COMPLETE BLD COUNT W/AUTO Routine      2020               

 Results for



             DIFF                      4:30 AM CDT               this



                                                                 procedure are



                                                                 in the



                                                                 results



                                                                 section.

 

             XR ABDOMEN 1 VW STAT         2020                Results for



                                       10:58 PM CDT              this



                                                                 procedure are



                                                                 in the



                                                                 results



                                                                 section.

 

             COVID-19 QUALITATIVE PCR Routine      2020                Res

ults for



                                       10:40 PM CDT              this



                                                                 procedure are



                                                                 in the



                                                                 results



                                                                 section.

 

             ESTIMATED GFR Routine      2020                Results for



                                       9:30 PM CDT               this



                                                                 procedure are



                                                                 in the



                                                                 results



                                                                 section.

 

             PHOSPHORUS LEVEL Routine      2020                Results for



                                       9:30 PM CDT               this



                                                                 procedure are



                                                                 in the



                                                                 results



                                                                 section.

 

             MAGNESIUM LEVEL Routine      2020                Results for



                                       9:30 PM CDT               this



                                                                 procedure are



                                                                 in the



                                                                 results



                                                                 section.

 

             COMPREHENSIVE METABOLIC PANEL Routine      2020              

  Results for



                                       9:30 PM CDT               this



                                                                 procedure are



                                                                 in the



                                                                 results



                                                                 section.

 

             PARTIAL THROMBOPLASTIN TIME Routine      2020                

Results for



             (PTT)                     9:30 PM CDT               this



                                                                 procedure are



                                                                 in the



                                                                 results



                                                                 section.

 

             PROTHROMBIN TIME WITH INR Routine      2020                Re

sults for



                                       9:30 PM CDT               this



                                                                 procedure are



                                                                 in the



                                                                 results



                                                                 section.

 

             HC COMPLETE BLD COUNT W/AUTO Routine      2020               

 Results for



             DIFF                      9:30 PM CDT               this



                                                                 procedure are



                                                                 in the



                                                                 results



                                                                 section.

 

             ESTIMATED GFR STAT         2020                Results for



                                       10:04 AM CDT              this



                                                                 procedure are



                                                                 in the



                                                                 results



                                                                 section.

 

             MAGNESIUM LEVEL STAT         2020   [facility]  Results for



                                       10:04 AM CDT carcinoma (HCC) this



                                                                 procedure are



                                                                 in the



                                                                 results



                                                                 section.

 

             HC COMPLETE BLD COUNT W/AUTO STAT         2020   [facility] 

 Results for



             DIFF                      10:04 AM CDT carcinoma (HCC) this



                                                                 procedure are



                                                                 in the



                                                                 results



                                                                 section.

 

             COMPREHENSIVE METABOLIC PANEL STAT         2020   [facility]

  Results for



                                       10:04 AM CDT carcinoma (HCC) this



                                                                 procedure are



                                                                 in the



                                                                 results



                                                                 section.

 

             ESTIMATED GFR STAT         06/15/2020                Results for



                                       9:15 AM CDT               this



                                                                 procedure are



                                                                 in the



                                                                 results



                                                                 section.

 

             MAGNESIUM LEVEL STAT         06/15/2020   [facility]  Results for



                                       9:15 AM CDT  carcinoma (HCC) this



                                                                 procedure are



                                                                 in the



                                                                 results



                                                                 section.

 

             HC COMPLETE BLD COUNT W/AUTO STAT         06/15/2020   [facility] 

 Results for



             DIFF                      9:15 AM CDT  carcinoma (HCC) this



                                                                 procedure are



                                                                 in the



                                                                 results



                                                                 section.

 

             COMPREHENSIVE METABOLIC PANEL STAT         06/15/2020   [facility]

  Results for



                                       9:15 AM CDT  carcinoma (HCC) this



                                                                 procedure are



                                                                 in the



                                                                 results



                                                                 section.

 

             FL < 1 HOUR  Routine      06/10/2020   Abnormal findings Results fo

r



                                       8:47 AM CDT  on diagnostic this



                                                    imaging of other procedure a

re



                                                    parts of digestive in the



                                                    tract        results



                                                                 section.

 

             ESOPHAGOGASTRODUODENOSCOPY              06/10/2020   Abnormal findi

ngs 



             (EGD)                     7:52 AM CDT  on diagnostic 



                                                    imaging of other 



                                                    parts of digestive 



                                                                tract



                                        



                                                    Dysphagia,   



                                                    unspecified  

 

             CT CHEST W CONTRAST ABDOMEN W Routine      2020   [facility]

  Results for



                CONTRAST PELVIS W CONTRAST                 10:33 AM CDT    carci

noma (HCC)



                                        this



                                                    Liver metastases procedure a

re



                                                    (HCC)        in the



                                                                 results



                                                                 section.

 

             ESTIMATED GFR STAT         2020                Results for



                                       9:59 AM CDT               this



                                                                 procedure are



                                                                 in the



                                                                 results



                                                                 section.

 

             MAGNESIUM LEVEL STAT         2020   [facility]  Results for



                                       9:59 AM CDT  carcinoma (HCC) this



                                                                 procedure are



                                                                 in the



                                                                 results



                                                                 section.

 

             HC COMPLETE BLD COUNT W/AUTO STAT         2020   [facility] 

 Results for



             DIFF                      9:59 AM CDT  carcinoma (HCC) this



                                                                 procedure are



                                                                 in the



                                                                 results



                                                                 section.

 

             COMPREHENSIVE METABOLIC PANEL STAT         2020   [facility]

  Results for



                                       9:59 AM CDT  carcinoma (HCC) this



                                                                 procedure are



                                                                 in the



                                                                 results



                                                                 section.

 

             ECG 12-LEAD  Routine      05/15/2020                

 

             ESTIMATED GFR STAT         2020                Results for



                                       9:23 AM CDT               this



                                                                 procedure are



                                                                 in the



                                                                 results



                                                                 section.

 

             MAGNESIUM LEVEL STAT         2020   [facility]  Results for



                                       9:23 AM CDT  carcinoma (HCC) this



                                                                 procedure are



                                                                 in the



                                                                 results



                                                                 section.

 

             HC COMPLETE BLD COUNT W/AUTO STAT         2020   [facility] 

 Results for



             DIFF                      9:23 AM CDT  carcinoma (HCC) this



                                                                 procedure are



                                                                 in the



                                                                 results



                                                                 section.

 

             COMPREHENSIVE METABOLIC PANEL STAT         2020   [facility]

  Results for



                                       9:23 AM CDT  carcinoma (HCC) this



                                                                 procedure are



                                                                 in the



                                                                 results



                                                                 section.

 

             TTE COMPLETE, WO CONTRAST, W Routine      2020   Encounter fo

r Results for



             DOPPLER ([id number])              11:08 AM CDT antineoplastic this



                                                    chemotherapy procedure are



                                                                 in the



                                                                 results



                                                                 section.

 

             FOLATE LEVEL STAT         2020   [facility]  Results for



                                       9:43 AM CDT  carcinoma (HCC) this



                                                                 procedure are



                                                                 in the



                                                                 results



                                                                 section.

 

             VITAMIN B12 LEVEL STAT         2020   [facility]  Results fo

r



                                       9:43 AM CDT  carcinoma (HCC) this



                                                                 procedure are



                                                                 in the



                                                                 results



                                                                 section.

 

             ESTIMATED GFR STAT         2020                Results for



                                       9:42 AM CDT               this



                                                                 procedure are



                                                                 in the



                                                                 results



                                                                 section.

 

             TOTAL IRON BINDING CAPACITY STAT         2020   [facility]  

Results for



                                       9:42 AM CDT  carcinoma (HCC) this



                                                                 procedure are



                                                                 in the



                                                                 results



                                                                 section.

 

             FERRITIN LEVEL STAT         2020   [facility]  Results for



                                       9:42 AM CDT  carcinoma (HCC) this



                                                                 procedure are



                                                                 in the



                                                                 results



                                                                 section.

 

             MAGNESIUM LEVEL STAT         2020   [facility]  Results for



                                       9:42 AM CDT  carcinoma (HCC) this



                                                                 procedure are



                                                                 in the



                                                                 results



                                                                 section.

 

             HC COMPLETE BLD COUNT W/AUTO STAT         2020   [facility] 

 Results for



             DIFF                      9:42 AM CDT  carcinoma (HCC) this



                                                                 procedure are



                                                                 in the



                                                                 results



                                                                 section.

 

             COMPREHENSIVE METABOLIC PANEL STAT         2020   [facility]

  Results for



                                       9:42 AM CDT  carcinoma (HCC) this



                                                                 procedure are



                                                                 in the



                                                                 results



                                                                 section.

 

             ESTIMATED GFR STAT         2020                Results for



                                       9:25 AM CDT               this



                                                                 procedure are



                                                                 in the



                                                                 results



                                                                 section.

 

             MAGNESIUM LEVEL STAT         2020   [facility]  Results for



                                       9:25 AM CDT  carcinoma (HCC) this



                                                                 procedure are



                                                                 in the



                                                                 results



                                                                 section.

 

             HC COMPLETE BLD COUNT W/AUTO STAT         2020   [facility] 

 Results for



             DIFF                      9:25 AM CDT  carcinoma (HCC) this



                                                                 procedure are



                                                                 in the



                                                                 results



                                                                 section.

 

             COMPREHENSIVE METABOLIC PANEL STAT         2020   [facility]

  Results for



                                       9:25 AM CDT  carcinoma (HCC) this



                                                                 procedure are



                                                                 in the



                                                                 results



                                                                 section.

 

             IR PORT PLACEMENT Routine      2020   Malignant neoplasm Resu

lts for



                                       11:36 AM CDT of esophagus, this



                                                    unspecified  procedure are



                                                                location (HCC)



                                        in the



                                                    Pre-op testing results



                                                                 section.

 

             XR CHEST 2 VW Routine      2020   Malignant neoplasm Results 

for



                                       12:36 PM CDT of esophagus, this



                                                    unspecified  procedure are



                                                    location (HCC) in the



                                                                 results



                                                                 section.

 

             ESTIMATED GFR Routine      2020                Results for



                                       12:08 PM CDT              this



                                                                 procedure are



                                                                 in the



                                                                 results



                                                                 section.

 

             PARTIAL THROMBOPLASTIN TIME Routine      2020   Malignant anna

plasm Results for



             (PTT)                     12:08 PM CDT of esophagus, this



                                                    unspecified  procedure are



                                                                location (HCC)



                                        in the



                                                    Pre-op testing results



                                                                 section.

 

             PROTHROMBIN TIME WITH INR Routine      2020   Malignant neopl

asm Results for



                                       12:08 PM CDT of esophagus, this



                                                    unspecified  procedure are



                                                                location (HCC)



                                        in the



                                                    Pre-op testing results



                                                                 section.

 

             CANCER ANTIGEN 19-9 Routine      2020   Malignant neoplasm Re

sults for



                                       12:08 PM CDT of esophagus, this



                                                    unspecified  procedure are



                                                    location (HCC) in the



                                                                 results



                                                                 section.

 

             CARCINOEMBRYONIC ANTIGEN (CEA) Routine      2020   Malignant 

neoplasm Results for



                                       12:08 PM CDT of esophagus, this



                                                    unspecified  procedure are



                                                    location (HCC) in the



                                                                 results



                                                                 section.

 

             COMPREHENSIVE METABOLIC PANEL Routine      2020   Malignant n

eoplasm Results for



                                       12:08 PM CDT of esophagus, this



                                                    unspecified  procedure are



                                                    location (HCC) in the



                                                                 results



                                                                 section.

 

             HC COMPLETE BLD COUNT W/AUTO Routine      2020   Malignant ne

oplasm Results for



             DIFF                      12:08 PM CDT of esophagus, this



                                                    unspecified  procedure are



                                                    location (HCC) in the



                                                                 results



                                                                 section.

 

             CT ABD/PELVIC EXTERNAL STUDY Routine      2020               

 Results for



                                       8:38 AM CST               this



                                                                 procedure are



                                                                 in the



                                                                 results



                                                                 section.

 

             XR CHEST EXTERNAL STUDY Routine      2020                Resu

lts for



                                       8:11 AM CST               this



                                                                 procedure are



                                                                 in the



                                                                 results



                                                                 section.



after 2019



Results

Smear review (2020  9:34 AM CDT)Only the most recent of2 resultswithin the
time period is included.





             Component    Value        Ref Range    Performed At Pathologist



                                                                 [facility]

 

             Platelet slide review Decreased (A)              [facility]     

 

             Anisocytosis Moderate                  [facility]     

 

             Ovalocytes   Moderate                  [facility]     









                                        Specimen

 

                                        









                Performing Organization Address         City/[facility]

 Number

 

                [facility] DEPARTMENT OF PATHOLOGY AND [address] [place] [id number] 



Estimated GFR (2020  9:34 AM CDT)Only the most recent of13 resultswithin 
the time period is included.





             Component    Value        Ref Range    Performed At Pathologist



                                                                 [facility]

 

             Estimated GFR >=90         mL/min/1.73  [facility] 



                          Comment:     m2           HOSPITAL     



                          Catergory Units  Interpretation                 

          



                          G1     >=90   Normal or high              

             



                          G2     60-89  Mildly decreased            

               



                          G3a    45-59  Mildly to moderately decreas

ed                           



                          G3b    30-44  Moderately to severely decre

ased                           



                          G4     15-29  Severely decreased          

                 



                          G5     <15   Kidney failure             

              



                          The eGFR was calculated using the Chronic Kidney Disea

se                           



                          Epidemiology Collaboration (CKD-EPI) equation.        

                   



                          Interpretation is based on recommendations of the     

                      



                          National Kidney Foundation-Kidney Disease Outcomes Espinoza

lity                           



                          Initiative (NKF-KDOQI) published in 2014.             

              



                                                                 









                                        Specimen

 

                                        









                Performing Organization Address         City/[facility]/Zipcode Phone

 Number

 

                [facility] DEPARTMENT OF PATHOLOGY AND [address] [place] [address]

0 



                [address] [address] 



CBC with platelet and differential (2020  9:34 AM CDT)Only the most recent
of13 resultswithin the time period is included.





             Component    Value        Ref Range    Performed At Pathologist



                                                                 Signature

 

             WBC          3.39 (L)     4.50 - 11.00 [facility] 



                                       k/uL         HOSPITAL     

 

             RBC          3.54 (L)     4.40 - 6.00  [facility] 



                                       m/uL         HOSPITAL     

 

             HGB          10.5 (L)     14.0 - 18.0  [facility] 



                                       g/dL         [facility]     

 

             HCT          33.6 (L)     41.0 - 51.0 % [facility]     

 

             MCV          94.9         82.0 - 100.0 [facility]     

 

             MCH          29.7         27.0 - 34.0 pg [facility]     

 

             MCHC         31.3         31.0 - 37.0  [facility] 



                                       g/dL         [facility]     

 

             RDW - SD     75.0 (H)     37.0 - 55.0 fL [facility]     

 

             MPV          12.2         8.8 - 13.2 fL [facility]     

 

             Platelet count 79 (L)       150 - 400 k/uL [facility]     

 

             Nucleated RBC 0.00         /100 WBC     [facility]     

 

             Neutrophils  48.1         39.0 - 69.0 % [facility]     

 

             Lymphocytes  27.7         25.0 - 45.0 % [facility]     

 

             Monocytes    23.3 (H)     0.0 - 10.0 % [facility]     

 

             Eosinophils  0.3          0.0 - 5.0 %  [facility]     

 

             Basophils    0.3          0.0 - 1.0 %  [facility]     

 

             Immature granulocytes 0.3Comment:  0.0 - 1.0 %  [facility] 



                          "[facility]     



                          granulocytes"                           



                          (promyelocytes                           



                          , myelocytes,                           



                          metamyelocytes                           



                          )                                      









                                        Specimen

 

                                        Blood









                Performing Organization Address         City/[facility] Phone

 Number

 

                [facility] DEPARTMENT OF PATHOLOGY AND [address] [place] [address]

0 



                [address] [address] 



Magnesium level (2020  9:34 AM CDT)Only the most recent of11 resultswithin
the time period is included.





             Component    Value        Ref Range    Performed At Pathologist Sig

nature

 

             Magnesium    1.9          1.6 - 2.4 mg/dL [facility]

L 









                                        Specimen

 

                                        Blood









                Performing Organization Address         City/[facility]/Zipcode Phone

 Number

 

                [facility] DEPARTMENT OF PATHOLOGY AND [address] [place] [address]

0 



                [address] [address] 



Comprehensive metabolic panel (2020  9:34 AM CDT)Only the most recent of11
resultswithin the time period is included.





             Component    Value        Ref Range    Performed At Pathologist



                                                                 Signature

 

             Sodium       137          135 - 148    [facility] 



                                       mEq/L        [facility]     

 

             Potassium    4.3          3.5 - 5.0    [facility] 



                                       mEq/L        [facility]     

 

             Chloride     105          98 - 112     [facility] 



                                       mEq/L        HOSPITAL     

 

             CO2          20 (L)       24 - 31 mEq/L [facility]     

 

             Anion gap    12@ANIO      7 - 15 mEq/L [facility]     

 

             BUN          15           8 - 23 mg/dL [facility]     

 

             Creatinine   0.69 (L)     0.70 - 1.20  [facility] 



                                       mg/dL        HOSPITAL     

 

             Glucose      81           65 - 99 mg/dL [facility]     

 

             Calcium      8.2 (L)      8.8 - 10.2   [facility] 



                                       mg/dL        [facility]     

 

             Protein      5.7 (L)      6.3 - 8.3    [facility] 



                          Comment:     g/dL         HOSPITAL     



                          -                                      



                          [place]         4.6-7.0 g/dL        

                   



                          1 week          4.4-7.6 g/dL        

                   



                          7 months-1year      5.1-7.3 g/dL            

               



                          1-2 years        5.6-7.5 g/dL         

                  



                          >3 years         6.0-8.0 g/dL         

                  



                                    6.3-8.3 g/dL        

                   



                                                                 

 

             Albumin      2.4 (L)      3.5 - 5.0    [facility] 



                                       g/dL         [facility]     

 

             A/G ratio    0.7          0.7 - 3.8    [facility]     

 

             Alkaline phosphatase 114          40 - 129 U/L [facility]     

 

             AST          31           10 - 50 U/L  [facility]     

 

             ALT          20           5 - 50 U/L   [facility]     

 

             Total bilirubin 0.3          0.0 - 1.2    [facility] 



                                       mg/dL        [facility]     









                                        Specimen

 

                                        Blood









                Performing Organization Address         City/State/Zipcode Phone

 Number

 

                [facility] DEPARTMENT OF PATHOLOGY AND [address] [place], [place] [address]

0 



                GENOMIC MEDICINE                                 

 

                [address] [address] 



CV Cardiac event monitor (2020)





                          Narrative                 Performed At

 

                                        This result has an attachment that is no

t available.



                                        



Basic metabolic panel (2020  4:27 AM CDT)Only the most recent of2 results
within the time period is included.





             Component    Value        Ref Range    Performed At Pathologist Sig

nature

 

             Sodium       138          135 - 148 mEq/L [facility]     

 

             Potassium    3.7          3.5 - 5.0 mEq/L [facility]     

 

             Chloride     105          98 - 112 mEq/L [facility]     

 

             CO2          21 (L)       24 - 31 mEq/L [facility]     

 

             Anion gap    12@ANIO      7 - 15 mEq/L [facility]     

 

             BUN          8            8 - 23 mg/dL [facility]     

 

             Creatinine   0.65 (L)     0.70 - 1.20 mg/dL [facility]     

 

             Glucose      114 (H)      65 - 99 mg/dL [facility]     

 

             Calcium      8.4 (L)      8.8 - 10.2 mg/dL [facility]     









                                        Specimen

 

                                        Blood









                Performing Organization Address         City/State/Zipcode Phone

 Number

 

                [facility] DEPARTMENT OF PATHOLOGY AND [address] [place], [place] [address]

0 



                GENOMIC MEDICINE                                 

 

                [facility] [address] [place] [address] 



[place] &lt; 1 Hour (2020 11:34 AM CDT)Only the most recent of2 resultswithin 
the time period is included.





                                        Specimen

 

                                        









                          Narrative                 Performed At

 

                                        EXAMINATION: FL < 1 HOUR



                                         [facility]



                                         



                                        



                                         



                                        



                                        LOCATION: [place] [address] ENDOSCOPY ROOM



                                        



                                        PROCEDURE: EGD WITH STENT REMOVAL



                                        



                                        SCHEDULED TIME:1100



                                        



                                        START TIME:1045



                                        



                                        END TIME: 1120



                                        



                                        FLUORO TIME: 0.3 MINS.



                                        



                                        DOSE (mGy): 3.0 MGy



                                        



                                        # OF IMAGES: 6



                                        



                                        TECH(S): MET



                                        



                                         



                                        



                                         



                                        



                                        IMPRESSION:



                                        



                                         



                                        



                                        Fluoroscopy was requested in the [facility]

py Suite. 



                                        



                                         



                                        



                          Separate endoscopy report will be issued by the physic

rosa performing the 



                          procedure.                









                                        Procedure Note

 

                                         Interface, Radiology Results Incoming

 - 2020  7:03 PM CDT



EXAMINATION:  FL < 1 HOUR



                                        



                                        



                                        LOCATION: [place] [address] ENDOSCOPY ROOM



                                        PROCEDURE: EGD WITH STENT REMOVAL



                                        SCHEDULED TIME:1100



                                        START TIME:1045



                                        END TIME: 1120



                                        FLUORO TIME: 0.3  MINS.



                                        DOSE (mGy):  3.0 MGy



                                        # OF IMAGES: 6



                                        TECH(S): MET



                                        



                                        



                                        IMPRESSION:



                                        



                                        Fluoroscopy was requested in the [facility]

py Suite.



                                        



                                        Separate endoscopy report will be issued

 by the physician performing the 

procedure.









                Performing Organization Address         City/State/Zipcode Phone

 Number

 

                 [facility]      [address] [place] [address] 



Airway (2020 11:00 AM CDT)





                          Narrative                 Performed At

 

                                        Bradford Mott MD   2020 11:01 AM



                                        



                                        Airway



                                        



                                        Date/Time: 2020 10:56 AM



                                        



                                        Performed by: Bradford Mott MD



                                        



                                        Authorized by: Bradford Mott MD 



                                        



                                         



                                        



                                        Location: Endoscopy



                                        



                                        Urgency: Elective



                                        



                                        Difficult Airway: No 



                                        



                                        Preoxygenated with 100% O2: Yes 



                                        



                                        C-spine Precautions Maintained Throughou

t: Yes 



                                        



                                        Mask Ventilation: Not attempted



                                        



                                        Final Airway Type: Endotracheal airway



                                        



                                        Final Endotracheal Airway: ETT



                                        



                                        Cuffed: Yes 



                                        



                                        Technique Used: Direct laryngoscopy



                                        



                                        Insertion Site: Oral



                                        



                                        Blade Type: Jeronimo



                                        



                                        Laryngoscope Blade/Videolaryngoscope Lizandro lara Size: 2



                                        



                                        ETT Size (mm): 8.0



                                        



                                        Cuff at minimum occlusion pressure: Yes 





                                        



                                        Measured from: Lips



                                        



                                        ETT to Lips (cm): 26



                                        



                                        Placement Verified by: CO2 detection, di

rect visualization and equal 



                                        



                                        breath sounds 



                                        



                                        Laryngoscopic view: Grade I - full vie

w of glottis



                                        



                                        Modified RSI: Yes 



                                        



                          Number of Attempts at Approach: 1 



Thyroid stimulating hormone (2020  3:54 AM CDT)





             Component    Value        Ref Range    Performed At Pathologist Sig

nature

 

             TSH          0.98         0.27 - 4.20 uIU/mL [facility] 









                                        Specimen

 

                                        Blood









                Performing Organization Address         City/[facility] Phone

 Number

 

                [facility] DEPARTMENT OF PATHOLOGY AND [address] [place] [phone] 



T4, free (2020  3:54 AM CDT)





             Component    Value        Ref Range    Performed At Pathologist Sig

[facility]

 

             T4, free     1.6          0.9 - 1.7 ng/dL [facility] 









                                        Specimen

 

                                        Blood









                Performing Organization Address         City/State/[facility] Phone

 Number

 

                [facility] DEPARTMENT OF PATHOLOGY AND [address] [place] [phone] 



Phosphorus level (2020  3:54 AM CDT)Only the most recent of3 resultswithin
the time period is included.





             Component    Value        Ref Range    Performed At Pathologist Sig

[facility]

 

             Phosphorus   2.8          2.4 - 4.5 mg/dL [facility] 









                                        Specimen

 

                                        Blood









                Performing Organization Address         City/[facility] Phone

 Number

 

                [facility] DEPARTMENT OF PATHOLOGY AND [address] [place] [id number] 



XR Abdomen 1 Vw (2020 10:58 PM CDT)





                                        Specimen

 

                                        









                          Narrative                 Performed At

 

                                        Examination: XR ABDOMEN 1 VW



                                        [facility]



                                         



                                        



                                        Clinical History: dysphagia  dislodg

ed esopahgeal stent



                                        



                                         



                                        



                                        Comparison: None.



                                        



                                         



                                        



                                        Findings:



                                        



                                         



                                        



                          Single frontal view of the abdomen is obtained. Bowel 

gas pattern is 



                          nonspecific but nonobstructive with scattered fecal ma

terial in the 



                                        colon.



                                        



                                         



                                        



                          There is a distal esophageal stent noted in the region

 of the distal 



                          esophagus, GE junction with the distal aspect overlyin

g the cardia or 



                                        proximal body of the stomach. Clinical c

orrelation is recommended.



                                        



                                         



                                        



                                        No free air is seen.



                                        



                                         



                                        



                                        IMPRESSION:



                                        



                                        1. Nonspecific but nonobstructive bowel 

gas pattern.



                                        



                                        2. Esophageal stent as described. Clinic

al correlation is recommended.



                                        



                                         



                                        



                                         



                                        



                          [facility]-[id number]            









                                        Procedure Note

 

                                        Hm Interface, Radiology Results Incoming

 - 2020 11:06 PM CDT



Examination:  XR ABDOMEN 1 VW



                                        



                                        Clinical History: dysphagia    dislodged

 esopahgeal stent



                                        



                                        Comparison: None.



                                        



                                        Findings:



                                        



                                        Single frontal view of the abdomen is ob

tained. Bowel gas pattern is nonspecific

but nonobstructive with scattered fecal material in the colon.



                                        



                                        There is a distal esophageal stent noted

 in the region of the distal esophagus, 

GE junction with the distal aspect overlying the cardia or proximal body of the 
stomach. Clinical correlation is recommended.



                                        



                                        No free air is seen.



                                        



                                        IMPRESSION:



                                        1. Nonspecific but nonobstructive bowel 

gas pattern.



                                        2. Esophageal stent as described. Clinic

al correlation is recommended.



                                        



                                        



                                        [facility]-[id number]









                Performing Organization Address         City/[facility]/Zipcode Phone

 Number

 

                [facility]      [address] [place] [address] 



COVID-19 qualitative PCR (2020 10:40 PM CDT)





             Component    Value        Ref Range    Performed At Pathologist



                                                                 [facility]

 

                          Interpretation            Negative results do not prec

lude 2019-nCoV infection and should 

not be used as the sole basis for treatment or other patient management 
decisions. Negative results must be combined with clinical observations, patient
history, and epidemiological                     [facility]             



                          information.              [facility]     

 

             COVID-19 qualitative Not-Detected Not-Detecte  [facility]      



             PCR result                d            [facility]     

 

             COVID-19 qualitative See link below for              [facility]      



             PCR          PDF Lab                   [facility]    



                          ReportComment: Case              [facility]     



                          Number: [id number]                           









                                        Specimen

 

                                        









                Performing Organization Address         City/[facility]/Zipcode Phone

 Number

 

                [facility] DEPARTMENT OF PATHOLOGY AND [address] [place], [place] [address]

0 



                GENOMIC MEDICINE                                 

 

                [address] [address] 

 

                [facility]                                 



Partial thromboplastin time, activated (2020  9:30 PM CDT)Only the most 
recent of2 resultswithin the time period is included.





             Component    Value        Ref Range    Performed At Pathologist



                                                                 [facility]

 

             PTT          28.8         23.0 - 36.0  [facility] 



                          Comment:     [facility]     



                          PTT therapeutic range for unfractionated heparin is   

                        



                          61.0-112.0 seconds which corresponds to Anti-Xa       

                    



                          0.3-0.7 U/ml.                           



                                                                 









                                        Specimen

 

                                        Blood









                Performing Organization Address         City/State/Zipcode Phone

 Number

 

                [facility] DEPARTMENT OF PATHOLOGY AND [address] [place], [place] [address]

0 



                [address] [address] 



Prothrombin time with INR (2020  9:30 PM CDT)Only the most recent of2 
resultswithin the time period is included.





             Component    Value        Ref Range    Performed At Pathologist



                                                                 [facility]

 

             Prothrombin time 14.5         11.5 - 14.5  [facility]     

 

             INR          1.1                       [facility]      



                          Comment:                  [facility]    



                          The International Normalized Ratio (INR) is a therapeu

[facility]     



                          monitoring tool for patients who are stable on oral   

                        



                          anticoagulant therapy. An INR of 2.0-3.0 is suggested 

for deep                           



                          vein thrombosis/pulmonary embolism.                   

        



                                                                 









                                        Specimen

 

                                        Blood









                Performing Organization Address         City/[facility]/Zipcode Phone

 Number

 

                [facility] DEPARTMENT OF PATHOLOGY AND [address] [place] [id number] 



CT Chest W Contrast Abdomen W Contrast Pelvis W Contrast (2020 10:33 AM 
CDT)





                                        Specimen

 

                                        









                          Narrative                 Performed At

 

                                        EXAMINATION: CT CHEST W CONTRAST ABDOM

EN W CONTRAST PELVIS W CONTRAST



                                         [facility]



                                         



                                        



                          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

lasm of cardia, 



                          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

epatic bile duct, 



                                        GE adenocarcinoma



                                        



                                         



                                        



                          TECHNIQUE: Multiple axial images of the chest, abdom

en, and pelvis 



                          were obtained following intravenous administration of 

iodinated 



                          contrast. Sagittal and coronal computerized reformatte

d images were 



                                        obtained. CT imaging was performed with 

iterative 



                                        



                          reconstruction techniques and/or automated exposure co

ntrol to reduce 



                                        radiation dose. 



                                        



                                         



                                        



                                        COMPARISON: To a previous outside exam

ination from 3/5/2020



                                        



                                         



                                        



                                        IMPRESSION:



                                        



                                         



                                        



                                        CHEST:



                                        



                                         



                                        



                          Lungs and airways: Patchy peripheral areas of groundgl

ass attenuation 



                          are present in the posterior segment of the right uppe

r lobe. Findings 



                          are nonspecific but are concerning for aspiration pneu

monia. The left 



                                        lung is clear. 



                                        



                                        Pleura: No pleural effusion or pneumotho

rax.



                                        



                                        Mediastinum and lymph nodes: No lymphade

nopathy. 



                                        



                          Cardiovascular: The heart size is normal. No pericardi

al effusion. The 



                                        thoracic aorta is normal in caliber. No 

dissection. 



                                        



                          Other: A metallic stent is present in the distal esoph

andreina extending 



                                        into the stomach. The stomach is distend

ed.



                                        



                                         



                                        



                                        ABDOMEN:



                                        



                                         



                                        



                          Liver: Extensive metastatic disease is present, most m

arked involving 



                          hepatic segments 7 and 8, without definite interval ch

belkis from the 



                                        March examination. Metastatic nodules me

asure 5 to 20 mm.



                                        



                          Gallbladder/Biliary: The gallbladder is normal. There 

is no evidence of 



                                        intra or extrahepatic biliary ductal dil

atation.



                                        



                                        Spleen: The spleen is not enlarged.



                                        



                                        Pancreas: The pancreas is small.



                                        



                                        Adrenal Glands: The adrenal glands are u

nremarkable.



                                        



                          Kidneys: The kidneys are unremarkable. No mass, hydron

ephrosis or 



                          calculi. Subcentimeter cysts are present in both kidne

ys not of clinical 



                                        significance.



                                        



                                        Vascular: The abdominal aorta is nonaneu

rysmal.



                                        



                                        Nodes: No enlarged retroperitoneal or me

senteric lymphadenopathy.



                                        



                          Bowel: No bowel obstruction or inflammatory changes. T

he appendix is 



                                        normal in appearance. Moderate amount of

 feces is noted in the colon.



                                        



                                        Ascites/fluid collections: No ascites or

 fluid collections.



                                        



                                         



                                        



                                        PELVIS:



                                        



                                        No mass, fluid collection or significant

 adenopathy. 



                                        



                                         



                                        



                                        MUSCULOSKELETAL: 



                                        



                                        No suspicious osseous lesions. 



                                        



                                         



                                        



                                        SUMMARY:



                                        



                                        1.Metastatic disease in the liver.



                                        



                                         



                                        



                                        2.Placement of a metallic stent across t

he GE junction.



                                        



                                         



                                        



                                         



                                        



                                        [id number]



                                        



                                         



                                        



                                         



                                        



                                         



                                        



                                         



                                        



                                                    









                                        Procedure Note

 

                                        Hm Interface, Radiology Results Incoming

 - 2020 11:25 AM CDT



EXAMINATION:  CT CHEST W CONTRAST ABDOMEN W CONTRAST PELVIS W CONTRAST



                                        



                                        CLINICAL HISTORY: 69 years Male  C16.0 M

alignant neoplasm of cardia, C78.7 

Secondary malignant neoplasm of liver and intrahepatic bile duct, GE 
adenocarcinoma



                                        



                                        TECHNIQUE:  Multiple axial images of the

 chest, abdomen, and pelvis were 

obtained following intravenous administration of iodinated contrast. Sagittal 
and coronal computerized reformatted images were obtained. CT imaging was 
performed with iterative



                                        reconstruction techniques and/or automat

ed exposure control to reduce radiation 

dose.



                                        



                                        COMPARISON:  To a previous outside exami

nation from 3/5/2020



                                        



                                        IMPRESSION:



                                        



                                        CHEST:



                                        



                                        Lungs and airways: Patchy peripheral are

as of groundglass attenuation are 

present in the posterior segment of the right upper lobe. Findings are 
nonspecific but are concerning for aspiration pneumonia. The left lung is clear.



                                        Pleura: No pleural effusion or pneumotho

rax.



                                        Mediastinum and lymph nodes: No lymphade

nopathy.



                                        Cardiovascular: The heart size is normal

. No pericardial effusion. The thoracic 

aorta is normal in caliber. No dissection.



                                        Other: A metallic stent is present in th

e distal esophagus extending into the 

stomach. The stomach is distended.



                                        



                                        ABDOMEN:



                                        



                                        Liver: Extensive metastatic disease is p

resent, most marked involving hepatic 

segments 7 and 8, without definite interval change from the March examination. 
Metastatic nodules measure 5 to 20 mm.



                                        Gallbladder/Biliary: The gallbladder is 

normal. There is no evidence of intra or

extrahepatic biliary ductal dilatation.



                                        Spleen: The spleen is not enlarged.



                                        Pancreas: The pancreas is small.



                                        Adrenal Glands: The adrenal glands are u

nremarkable.



                                        Kidneys: The kidneys are unremarkable. N

o mass, hydronephrosis or calculi. 

Subcentimeter cysts are present in both kidneys not of clinical significance.



                                        Vascular: The abdominal aorta is nonaneu

rysmal.



                                        Nodes: No enlarged retroperitoneal or me

senteric lymphadenopathy.



                                        Bowel: No bowel obstruction or inflammat

ory changes. The appendix is normal in 

appearance. Moderate amount of feces is noted in the colon.



                                        Ascites/fluid collections: No ascites or

 fluid collections.



                                        



                                        PELVIS:



                                        No mass, fluid collection or significant

 adenopathy.



                                        



                                        MUSCULOSKELETAL:



                                        No suspicious osseous lesions.



                                        



                                        SUMMARY:



                                        1.Metastatic disease in the liver.



                                        



                                        2.Placement of a metallic stent across t

he GE junction.



                                        



                                        



                                        [id number]









                Performing Organization Address         City/State/Zipcode Phone

 Number

 

                 [facility]      [address] [place] [address] 



ECG 12 lead (05/15/2020)





                          Narrative                 Performed At

 

                                        This result has an attachment that is no

t available.



                                        



Transthoracic Echocardiogram Complete, (w Contrast, Strain and 3D if needed) 
(2020 11:08 AM CDT)





                                        Specimen

 

                                        









                          Narrative                 Performed At

 

                                        This result has an attachment that is no

t available.



                   [facility]



                                      Echocardiography R

eport 



                                [address] [place] [address] 



                                            



                                        Pat.Name: MEGAN AVERY.ID:  [id number]       

                                      



                                        .Date:  2020       

 Refer.MD: MATT MCGREGOR MD   

                                      



                                        Exam Time: 9:07:00 AM       

Study Type:Routine Echo     

                                      



                                        Height:  73in        

  Weight:  159lb     

                                



                                        BSA:    1.95 m2       

   Age: 1950,69Y   

                                  



                                        Sex:    MALE        

  BP:    115/67    

                            



                                        HR:    69 bpm       

  Sonogrphr: EDI Rios   

                                  



                                        Pat. Stat.:Outpatient       

Room:   [facility]       

                                  



                          Study Status:Final         



                          Echo Event ID:[id number]       



                          Order ID: [id number]        



                          Reason for Study:chemotherapy    



                          Procedures: 2D Echo, Colorflow Doppler, Strain 



                          Race:   C            



                          ------------------------------------ 



                          SUMMARY:                  



                          ------------------------------------ 



                          LV EF is normal.          



                          Estimated EF is 55-59%.  



                          RV systolic function is normal. 



                          Estimated PA systolic pressure is 26-31 mmHg, assuming

 a mean RAP of 



                          5-10 mmHg.                



                          ------------------------------------ 



                          FINDINGS:                 



                          ------------------------------------ 



                          LV:    LV EF is normal. Overall wall motion is n

ormal. Estimated EF 



                               is 55-59%. Normal average LV global jon

gitudinal strain at 



                               -18.4%.    



                          RV:    RV size is normal. RV systolic function i

s normal. 



                          LA:    LA volume is severely enlarged. 



                          RA:    RA size is normal. 



                          AO:    Aortic root diameter is normal. 



                          MARK:   No pericardial effusion. 



                          AV:    No structural AV abnormalities noted. 



                          MV:    No structural MV abnormalities noted. A t

race of mitral 



                               regurgitation. 



                          PV:    No structural PV abnormalities noted. 



                          TV:    No structural TV abnormalities noted. Mil

d tricuspid 



                               regurgitation 



                          [place]:   Normal diastolic function and LV filling p

ressures. 



                          Other:  Estimated PA systolic pressure is 26-31 mm

Hg, assuming a 



                               mean RAP of 5-10 mmHg. 



                          ------------------------------------ 



                          MEASUREMENTS:             



                          ------------------------------------ 



                                           2D 



                          Parasternal Long Axis     



                           Ao An      2.4 cm      LV%fs 

      30 %  



                                         Ao Rtd      3.4 cm    

  Index    1.7 cm/m2   

                                



                           LVPWd      0.96 cm  



                                         IVSd       1.1 cm    

  RWT       0.4  

                                      



                                         LVIDd      4.9 cm    

  Index    2.5 cm/m2  

                                



                           LV Mass     186 g  (122-174) 



                           LVIDs      3.4 cm      LVM In

dex     96 g/m 



                          LA Sng Plane              



                           LA Area      27 cm (8.8-23.4) LA Vol 

     93 ml  



                           Index    48 ml/m2 



                           LA LngAx      6 cm      



                          LVOT Stroke Vol           



                           LVOT       2.3 cm      



                          LVOT                    



                           LVOT Area    4.2 cm      



                                          DOPPLE

R 



                          LVOT Stroke Vol           



                                         LVOT TVI     18 cm    

  LVOT CI     2.4 l/m/m

                                        



                           LVOT SV      76 ml      HR  

      61 bpm 



                           LVOT CO     4.6 l/min     



                          LVOT                    



                           SVi        39 ml/m     



                                                    



                          Signed 2020 11:28 AM 



                          Franck Yoon M.D.           









                                        Procedure Note

 

                                        Interface, Radiology Results In - 2020 11:28 AM CDT







                                                                Echocardiography

 Report



                                                   [address] [place] [address]



                                        



                                        Pat.Name:  MEGAN AVERY    PatWayneI

D:    [id number]



                                        .Date:   2020               Refer

.MD:  MATT MCGREGOR MD



                                        Exam Time: 9:07:00 AM              Study

 Type:Routine Echo



                                        Height:    73in                    Weigh

t:    159lb



                                        BSA:       1.95 m2                   

Age:  1950,69Y



                                        Sex:       MALE                    BP:  

      115/67



                                        HR:        69 bpm                  Sonog

rphr: Tig[facility] Estrada [facility]



                                        Pat. Stat.:Outpatient              Room:

      [facility]



                                        Study Status:Final



                                        Echo Event ID:[id number]



                                        Order ID:  [id number]



                                        Reason for Study:chemotherapy



                                        Procedures: 2D Echo, Colorflow Doppler, 

Strain



                                        Race:      C



                                        ------------------------------------



                                        SUMMARY:



                                        ------------------------------------



                                        LV EF is normal.



                                        Estimated EF is 55-59%.



                                        RV systolic function is normal.



                                        Estimated PA systolic pressure is 26-31 

mmHg, assuming a mean RAP of



                                        5-10 mmHg.



                                        ------------------------------------



                                        FINDINGS:



                                        ------------------------------------



                                        LV:       LV EF is normal. Overall wall 

motion is normal. Estimated EF



                                                  is 55-59%. Normal average LV g

lobal longitudinal strain at



                                                  -18.4%.



                                        RV:       RV size is normal. RV systolic

 function is normal.



                                        LA:       LA volume is severely enlarged

.



                                        RA:       RA size is normal.



                                        AO:       Aortic root diameter is normal

.



                                        MARK:     No pericardial effusion.



                                        AV:       No structural AV abnormalities

 noted.



                                        MV:       No structural MV abnormalities

 noted. A trace of mitral



                                                  regurgitation.



                                        PV:       No structural PV abnormalities

 noted.



                                        TV:       No structural TV abnormalities

 noted. Mild tricuspid



                                                  regurgitation



                                        [place]:     Normal diastolic function and 

LV filling pressures.



                                        Other:    Estimated PA systolic pressure

 is 26-31 mmHg, assuming a



                                                  mean RAP of 5-10 mmHg.



                                        ------------------------------------



                                        MEASUREMENTS:



                                        ------------------------------------



                                                                          2D



                                        Parasternal Long Axis



                                         Ao An            2.4 cm            LV%f

s             30 %



                                         Ao Rtd           3.4 cm            Inde

x        1.7 cm/m2



                                         LVPWd           0.96 cm



                                         IVSd             1.1 cm            RWT 

             0.4



                                         LVIDd            4.9 cm            Inde

x        2.5 cm/m2



                                         LV Mass          186 g    (122-174)



                                         LVIDs            3.4 cm            LVM 

Index         96 g/m



                                        LA Sng Plane



                                         LA Area           27 cm  (8.8-23.4) L

A Vol            93 ml



                                         Index        48 ml/m2



                                         LA LngAx           6 cm



                                        LVOT Stroke Vol



                                         LVOT             2.3 cm



                                        LVOT



                                         LVOT Area        4.2 cm



                                                                        DOPPLER



                                        LVOT Stroke Vol



                                         LVOT TVI          18 cm            LVOT

 CI          2.4 l/m/m



                                         LVOT SV           76 ml            HR  

              61 bpm



                                         LVOT CO          4.6 l/min



                                        LVOT



                                         SVi               39 ml/m



                                        



                                        Signed 2020 11:28 AM



                                        Franck Yoon M.D.









                Performing Organization Address         City/State/Zipcode Phone

 Number

 

                 [facility]        [address] [place] [phone] 



Folate level (2020  9:43 AM CDT)





             Component    Value        Ref Range    Performed At Pathologist Sig

nature

 

             Folate       10.8         4.8 - 24.2 ng/mL [facility] 









                                        Specimen

 

                                        Serum









                Performing Organization Address         City/[facility] Phone

 Number

 

                [facility] DEPARTMENT OF PATHOLOGY AND [address] [place] [address]

0 



                [address] [address] 



Vitamin B12 level (2020  9:43 AM CDT)





             Component    Value        Ref Range    Performed At Pathologist



                                                                 Signature

 

             Vitamin B12  532          211 - 946    [facility] 



                          Comment:     pg/mL        HOSPITAL     



                          Significant overlap exists between normal and deficien

cy states.                           



                          However, most patients with deficiencies will have Ser

um B12                             



                                                    <200 pg/mL.      

                 

                                                    



                                                                 









                                        Specimen

 

                                        Serum









                Performing Organization Address         City/[facility] Phone

 Number

 

                [facility] DEPARTMENT OF PATHOLOGY AND [address] [place] [address]

0 



                [address] [address] 



Total iron binding capacity (2020  9:42 AM CDT)





             Component    Value        Ref Range    Performed At Pathologist Sig

[facility]

 

             Iron level   15 (L)       59 - 158 ug/dL [facility]     

 

             Iron binding capacity 178 (L)      200 - 400 ug/dL [facility]     

 

             % Saturation 8.4 (L)      20.0 - 40.0 % [facility]     









                                        Specimen

 

                                        Blood









                Performing Organization Address         City/[facility] Phone

 Number

 

                [facility] DEPARTMENT OF PATHOLOGY AND [address] [place] [address]

0 



                [address] [address] 



Ferritin level (2020  9:42 AM CDT)





             Component    Value        Ref Range    Performed At Pathologist Sig

nature

 

             Ferritin level 228          30 - 400 ng/mL [facility] 









                                        Specimen

 

                                        Blood









                Performing Organization Address         City/[facility] Phone

 Number

 

                [facility] DEPARTMENT OF PATHOLOGY AND [address] [place] [address]

0 



                [address] [address] 



IR Port Placement (2020 11:36 AM CDT)





                                        Specimen

 

                                        









                          Narrative                 Performed At

 

                                        EXAMINATION: IR PORT PLACEMENT



                                        [facility]



                                         



                                        



                          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

salvador unspecified, 



                                        Z01.818 Encounter for other preprocedura

l examination, esophageal cancer



                                        



                                         



                                        



                                        COMPARISON: None.



                                        



                                         



                                        



                                        PROCEDURE: Venous port placement



                                        



                                         



                                        



                                        Procedural Personnel



                                        



                                        Attending physician(s): Yuri vang MD



                                        



                                         



                                        



                                        Pre-procedure diagnosis: Esophageal canc

er



                                        



                                        Post-procedure diagnosis: Same



                                        



                                        Indication: Administration of chemothera

py



                                        



                                        Additional clinical history: None



                                        



                                         



                                        



                                        Complications: No immediate complication

s.



                                        



                                         



                                        



                          IMPRESSION: Insertion of right-sided power-injectable 

single-lumen 



                          tunneled chest port, with catheter tip in the expected

 location of the 



                                        cavoatrial junction.



                                        



                                         



                                        



                                        Plan: The port may be used immediately. 



                                        



                                        ________________________________________

_______________________



                                        



                                         



                                        



                                        PROCEDURE SUMMARY:



                                        



                                        - Venous access with ultrasound guidance



                                        



                                        - Tunneled port insertion under fluorosc

opic guidance



                                        



                                        - Additional procedure(s): None



                                        



                                         



                                        



                                        Pre-procedure



                                        



                                        History and imaging of central venous ac

cess reviewed (QCDR): Yes 



                                        



                          Consent: Informed consent for the procedure including 

risks, benefits 



                          and alternatives was obtained and time-out was perform

ed prior to the 



                                        procedure.



                                        



                          Preparation (MIPS): The site was prepared and draped u

sing all elements 



                          of maximal sterile barrier technique including sterile

 gloves, sterile 



                          gown, cap, mask, large sterile sheet, sterile ultrasou

nd probe cover, 



                                        hand hygiene and cutaneous antisepsis 



                                        



                                        with 2% chlorhexidine. 



                                        



                                        Medical reason for site preparation exce

ption (MIPS): Not applicable



                                        



                                         



                                        



                                        Anesthesia/sedation



                                        



                                        Level of anesthesia/sedation: Moderate s

edation (conscious sedation)



                                        



                          Anesthesia/sedation administered by: Independent train

ed observer under 



                          attending supervision with continuous monitoring of th

e patients level 



                                        of consciousness and physiologic status



                                        



                                        Total intra-service sedation time (minut

es): 43



                                        



                                         



                                        



                                        Access



                                        



                          Local anesthesia was administered. The vessel was sono

graphically 



                          evaluated and determined to be patent. Real time ultra

sound was used to 



                          visualize needle entry into the vessel and a permanent

 image was not 



                                        stored.



                                        



                                        Vein accessed: Internal jugular vein



                                        



                                        Access technique: Micropuncture set with

 21 gauge needle



                                        



                                         



                                        



                                        Port placement



                                        



                          An incision was made at the upper chest, a pocket was 

created, and the 



                          catheter was tunneled subcutaneously to the venous acc

ess site and 



                          trimmed to appropriate length. The port was inserted i

nto the pocket and 



                                        the catheter was advanced via a peel-urmila

y 



                                        



                          sheath into the vein under fluoroscopic guidance. The 

port was not 



                          sutured into the pocket. Catheter tip location was flu

oroscopically 



                                        verified and a permanent image was store

d.



                                        



                                        Port placed: [employer] SmartPort



                                        



                                        Catheter size (French): 6



                                        



                                        Catheter flush: Heparin (100 units/mL)



                                        



                                         



                                        



                                        Closure



                                        



                          The access site and incision were closed and sterile d

ressing(s) were 



                                        applied.



                                        



                                        Access site closure technique: Tissue ad

hesive



                                        



                                        Incision closure technique: Absorbable s

uture and tissue adhesive



                                        



                                        Patient discharged from procedure suite 

with device accessed: No



                                        



                                         



                                        



                                        Contrast



                                        



                                        Contrast agent: None



                                        



                                        Contrast volume (mL): 0



                                        



                                         



                                        



                                        Radiation Dose



                                        



                                        Reference air kerma (mGy): 7 



                                        



                                         



                                        



                                         



                                        



                                        Additional Details



                                        



                                        Additional description of procedure: Non

e



                                        



                                        Equipment details: None



                                        



                                        Specimens removed: None



                                        



                                        Estimated blood loss (mL): Less than 10



                                        



                                        Standardized report: SIR_Port_v2



                                        



                                         



                                        



                                         



                                        



                          [facility]-[id number]            









                                        Procedure Note

 

                                        Hm Interface, Radiology Results Incoming

 - 2020  2:59 PM CDT



EXAMINATION:  IR PORT PLACEMENT



                                        



                                        CLINICAL HISTORY:  C15.9 Malignant neopl

asm of esophagus  unspecified, Z01.818 

Encounter for other preprocedural examination, esophageal cancer



                                        



                                        COMPARISON:  None.



                                        



                                        PROCEDURE: Venous port placement



                                        



                                        Procedural Personnel



                                        Attending physician(s): Yuri vang MD



                                        



                                        Pre-procedure diagnosis: Esophageal canc

er



                                        Post-procedure diagnosis: Same



                                        Indication: Administration of chemothera

py



                                        Additional clinical history: None



                                        



                                        Complications: No immediate complication

s.



                                        



                                        IMPRESSION: Insertion of right-sided pow

er-injectable single-lumen tunneled 

chest port, with catheter tip in the expected location of the cavoatrial 
junction.



                                        



                                        Plan: The port may be used immediately.



                                        ________________________________________

_______________________



                                        



                                        PROCEDURE SUMMARY:



                                        - Venous access with ultrasound guidance



                                        - Tunneled port insertion under fluorosc

opic guidance



                                        - Additional procedure(s): None



                                        



                                        Pre-procedure



                                        History and imaging of central venous ac

cess reviewed (QCDR): Yes



                                        Consent: Informed consent for the proced

ure including risks, benefits and 

alternatives was obtained and time-out was performed prior to the procedure.



                                        Preparation (MIPS): The site was prepare

d and draped using all elements of 

maximal sterile barrier technique including sterile gloves, sterile gown, cap, 
mask, large sterile sheet, sterile ultrasound probe cover, hand



                                        hygiene and cutaneous antisepsis



                                        with 2% chlorhexidine.



                                        Medical reason for site preparation exce

ption (MIPS): Not applicable



                                        



                                        Anesthesia/sedation



                                        Level of anesthesia/sedation: Moderate s

edation (conscious sedation)



                                        Anesthesia/sedation administered by: Ind

ependent trained observer under 

attending supervision with continuous monitoring of the patients level of 
consciousness and physiologic status



                                        Total intra-service sedation time (minut

es): 43



                                        



                                        Access



                                        Local anesthesia was administered. The v

essel was sonographically evaluated and 

determined to be patent. Real time ultrasound was used to visualize needle entry
into the vessel and a permanent image was not stored.



                                        Vein accessed: Internal jugular vein



                                        Access technique: Micropuncture set with

 21 gauge needle



                                        



                                        Port placement



                                        An incision was made at the upper chest,

 a pocket was created, and the catheter 

was tunneled subcutaneously to the venous access site and trimmed to appropriate
length. The port was inserted into the pocket and the



                                        catheter was advanced via a peel-away



                                        sheath into the vein under fluoroscopic 

guidance. The port was not sutured into 

the pocket. Catheter tip location was fluoroscopically verified and a permanent 
image was stored.



                                        Port placed: [employer] SmartPort



                                        Catheter size (French): 6



                                        Catheter flush: Heparin (100 units/mL)



                                        



                                        Closure



                                        The access site and incision were closed

 and sterile dressing(s) were applied.



                                        Access site closure technique: Tissue ad

hesive



                                        Incision closure technique: Absorbable s

uture and tissue adhesive



                                        Patient discharged from procedure suite 

with device accessed: No



                                        



                                        Contrast



                                        Contrast agent: None



                                        Contrast volume (mL): 0



                                        



                                        Radiation Dose



                                        Reference air kerma (mGy): 7



                                        



                                        



                                        Additional Details



                                        Additional description of procedure: Non

e



                                        Equipment details: None



                                        Specimens removed: None



                                        Estimated blood loss (mL): Less than 10



                                        Standardized report: [email or web address]_[email or web address]



                                        



                                        



                                        [facility]-[id number]









                Performing Organization Address         City/State/Zipcode Phone

 Number

 

                 [facility]      [address] [place] [address] 



XR Chest 2 Vw (2020 12:36 PM CDT)





                                        Specimen

 

                                        









                          Narrative                 Performed At

 

                                        EXAMINATION: XR CHEST 2 VW



                                         [facility]



                                         



                                        



                          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

salvador unspecified, 



                                        esopahgeal cancer



                                        



                                         



                                        



                                        COMPARISON: None



                                        



                                         



                                        



                                        IMPRESSION:



                                        



                                        1.Examination was performed with nipple 

markers.



                                        



                                        2.No pulmonary infiltrates or nodules ar

e seen.



                                        



                                        3.Heart size within normal limits. Vesse

ls are not congested.



                                        



                                         



                                        



                                         



                                        



                          [facility]-[id number]           









                                        Procedure Note

 

                                        Hm Interface, Radiology Results Incoming

 - 2020 12:41 PM CDT



EXAMINATION:  XR CHEST 2 VW



                                        



                                        CLINICAL HISTORY:  C15.9 Malignant neopl

asm of esophagus  unspecified, 

esopahgeal cancer



                                        



                                        COMPARISON:  None



                                        



                                        IMPRESSION:



                                        1.Examination was performed with nipple 

markers.



                                        2.No pulmonary infiltrates or nodules ar

e seen.



                                        3.Heart size within normal limits. Vesse

ls are not congested.



                                        



                                        



                                        [id number]









                Performing Organization Address         City/[facility] Phone

 Number

 

                 [facility]      [address] [place] [phone] 



Cancer antigen 19-9 (2020 12:08 PM CDT)





             Component    Value        Ref Range    Performed At Pathologist



                                                                 Signature

 

             CA 19-9      1            0 - 35 U/mL  [facility] 



                          Comment:                  HOSPITAL     



                          The Roche Moises 8000  immunoassay was used.     

                      



                          Results obtained with different assay methods or kits 

                          



                          should not be used interchangeably and may be differen

t.                           



                                                                 









                                        Specimen

 

                                        Blood









                Performing Organization Address         City/[facility] Phone

 Number

 

                [facility] DEPARTMENT OF PATHOLOGY AND [address] [place] [address]

[address] [place] [phone] 



Carcinoembryonic antigen (CEA) (2020 12:08 PM CDT)





             Component    Value        Ref Range    Performed At Pathologist



                                                                 Signature

 

             CEA          3.3          0.0 - 3.8    [facility] 



                          Comment:     ng/mL        HOSPITAL     



                          Reference range for heavy smokers: 0.0 - 5.5 ng/mL  

                         



                          The ROCHE Moises 8000 CEA immunoassay was used.        

                   



                          Results obtained with different assay methods or kits 

                          



                          should not be used interchangeably and may be differen

t.                           



                                                                 









                                        Specimen

 

                                        Serum









                Performing Organization Address         City/[facility] Phone

 Number

 

                [facility] DEPARTMENT OF PATHOLOGY AND [address] [place] [address]

0 



                [address] [address] 



CT Abd/Pelvic External Study (2020  8:38 AM CST)





                                        Specimen

 

                                        









                          Narrative                 Performed At

 

                                        This exam was not acquired at a [facility]

t facility and has not been 



                                         [facility]



                                        interpreted by a [facility] Provider. T

he exam was imported into our 



                                        



                          imaging system.           









                Performing Organization Address         City/[facility]/Zipcode Phone

 Number

 

                 [facility]      [address] [place] [phone] 



XR Chest External Study (2020  8:11 AM CST)





                                        Specimen

 

                                        









                          Narrative                 Performed At

 

                                        This exam was not acquired at a [facility]

[facility] facility and has not been 



                                         [facility]



                                        interpreted by a [facility] Provider. T

he exam was imported into our 



                                        



                          imaging system.           









                Performing Organization Address         City/State/Zipcode Phone

 Number

 

                 [facility]      [address] [place] [address] 



after 2019



Insurance







          Payer     Benefit Plan / Group Subscriber ID Effective Dates Phone    

 Address   Type

 

          [employer] xxxxxxxxxxxx 2017-Present 

                    HMO



                    ESSENTIALS                                         









           Guarantor Name Account Type Relation to Date of    Phone      Billing



                                 Patient    Birth                 Address

 

           Korey Avery Personal/Family Self       1950 [phone] [address]                                          (Home)     [place] [address]







Advance Directives

For more information, please contact: [phone]





                Type            Date Recorded   Patient Representative Explanati

on

 

                Advance Directives, Living Will 6/10/2020  7:14 AM              

   



                and Medical Power of

## 2020-08-01 NOTE — ER
Nurse's Notes                                                                                     

 [facility]                                                                 

Name: Megan Julio                                                                         

Age: 69 yrs                                                                                       

Sex: Male                                                                                         

: 1950                                                                                   

MRN: [id number]                                                                                   

Arrival Date: 2020                                                                          

Time: 03:29                                                                                       

Account#: [id number]                                                                            

Bed 8                                                                                             

Private MD:                                                                                       

Diagnosis: GI Bleed                                                                               

                                                                                                  

Presentation:                                                                                     

                                                                                             

03:29 Chief complaint: EMS states: they were toned out for report of pt having syncopal       bb  

      episode and fall in the bathroom pt was hypotensive on their arrival BP was 77 systolic     

      they started an IV and gave NS. Pt states he does not remember passing out. Pt has had      

      diarrhea x 2 days. Coronavirus screen: The client reports previous COVID testing was        

      negative. Date of collection: 2020 pt's wife is positive for COVID. Ebola          

      Screen: No symptoms or risks identified at this time. Initial Sepsis Screen: Does the       

      patient meet any 2 criteria? No. Patient's initial sepsis screen is negative. Does the      

      patient have a suspected source of infection? No. Patient's initial sepsis screen is        

      negative. Risk Assessment: Do you want to hurt yourself or someone else? Patient            

      reports no desire to harm self or others. Onset of symptoms was 2020. Care       

      prior to arrival: Medication(s) given: Normal saline infusion, 400 mL IV initiated. 20      

      GA, in the left antecubital area, Glucose check: 140.                                       

03:29 Method Of Arrival: EMS: [facility]  

03:29 Acuity: BRETT 2                                                                           bb  

03:40 Note spouse Josy De La Rosa phone # [phone], Pt's doctor at [address]  

      [phone].                                                                                   

20:41 Note [facility] notified by telephone of pt's COVID positive results.                    bb  

                                                                                                  

Triage Assessment:                                                                                

03:35 General: Appears cachectic, Behavior is calm, cooperative. Pain: Complains of pain in   bb  

      hips. Neuro: Reports a syncopal episode.                                                    

                                                                                                  

Historical:                                                                                       

- Allergies:                                                                                      

03:35 Sulfa (Sulfonamide Antibiotics);                                                        bb  

- Home Meds:                                                                                      

03:35 Hydrocodone-Acetaminophen Oral [Active];                                                bb  

- PMHx:                                                                                           

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  

                                                                                                  

- Immunization history:: Adult Immunizations unknown.                                             

- Social history:: Smoking status: Patient/guardian denies using tobacco, Stopped _               

  months ago 7.                                                                                   

                                                                                                  

                                                                                                  

Screenin:35 Abuse screen: Denies threats or abuse. Denies injuries from another. Nutritional        rr5 

      screening: No deficits noted. Tuberculosis screening: No symptoms or risk factors           

      identified. Fall Risk IV access (20 points). Total Andujar Fall Scale indicates No Risk       

      (0-24 pts).                                                                                 

                                                                                                  

Assessment:                                                                                       

03:35 General: Appears in no apparent distress. uncomfortable, Behavior is calm, cooperative. rr5 

03:35 Pain: Denies pain. Neuro: Level of Consciousness is awake, alert, obeys commands,       rr5 

      Oriented to person, place, time, situation, Reports a syncopal episode weakness.            

      Cardiovascular: Rhythm is regular. Respiratory: Airway is patent Respiratory effort is      

      even, unlabored, Respiratory pattern is regular, symmetrical. GI: No signs and/or           

      symptoms were reported involving the gastrointestinal system. : No signs and/or           

      symptoms were reported regarding the genitourinary system. EENT: No signs and/or            

      symptoms were reported regarding the EENT system. Derm: Skin is fragile, is thin, Skin      

      is pale, Skin temperature is warm. Musculoskeletal: Reports.                                

04:05 Reassessment: Patient appears in no apparent distress at this time. Patient is alert,   rr5 

      oriented x 3, equal unlabored respirations, skin warm/dry/pink. came back from C T          

      scan. accessed port a catheter right chest.                                                 

05:20 Reassessment: Patient appears in no apparent distress at this time. Patient is alert,   rr5 

      oriented x 3, equal unlabored respirations, skin warm/dry/pink. first unit of PRBC          

      started, consented, double checked with liliam SANCHEZ Type A negative blood component number     

      [id number], administration lot number [id number] Expires 20 [id number].                     

05:51 Reassessment: call made to [facility] spoke to [facility], she responded to   rr5 

      call back after 15 minutes.                                                                 

06:20 Reassessment: patient wife josy [phone] updated for the plan of care spoke over  rr5 

      the phone.                                                                                  

06:35 Reassessment: Patient appears in no apparent distress at this time. Patient is alert,   rr5 

      oriented x 3, equal unlabored respirations, skin warm/dry/pink. report given to carine SANCHEZ from [facility] over the phone.                                                           

07:10 Reassessment: Patient appears in no apparent distress at this time. Patient is alert,   rr5 

      oriented x 3, equal unlabored respirations, skin warm/dry/pink. report given to LUAN       

      awake, alert vitally stable, no complaints made. 1st Unit PRBC consumed and terminated.     

15:34 Reassessment: + COVID RESULTS FAXED TO TRANSFERRING FACILITY. [facility] [place].              

                                                                                                  

Vital Signs:                                                                                      

03:29 BP 85 / 66; Pulse 77; Resp 14 S; Temp 97.6(O); Pulse Ox 95% on R/A; Weight 65.77 kg     bb  

      (R); Height 6 ft. 1 in. (185.42 cm) (R);                                                    

03:44 BP 97 / 58; Pulse 75; Resp 16; Pulse Ox 99% ;                                           rr5 

04:30 BP 94 / 63; Pulse 68; Resp 14; Pulse Ox 100% ;                                          rr5 

04:40  / 64; Pulse 67; Resp 19; Pulse Ox 99% ;                                          rr5 

05:20 BP 93 / 70; Pulse 69; Resp 16; Temp 97.4; Pulse Ox 100% on R/A;                         rr5 

03:29 Body Mass Index 19.13 (65.77 kg, 185.42 cm)                                             bb  

05:20 suceeding VS please refer to BT form.                                                   rr5 

                                                                                                  

Ritesh Coma Score:                                                                               

03:35 Eye Response: spontaneous(4). Verbal Response: oriented(5). Motor Response: obeys       rr5 

      commands(6). Total: 15.                                                                     

04:30 Eye Response: spontaneous(4). Verbal Response: oriented(5). Motor Response: obeys       rr5 

      commands(6). Total: 15.                                                                     

                                                                                                  

ED Course:                                                                                        

03:29 Patient arrived in ED.                                                                  bb  

03:30 Maintain EMS IV. Dressing intact. Good blood return noted. Site clean \T\ dry. Gauge \T\    rr
5

      site: 20 left AC.                                                                           

03:32 Pablo Olivas RN is Primary Nurse.                                                  rv  

03:34 Triage completed.                                                                       bb  

03:35 Ion Dexter MD is Attending Physician.                                             mh7 

03:35 Arm band placed on Patient placed in an exam room, on a stretcher, on pulse oximetry.   bb  

03:35 Patient has correct armband on for positive identification. Placed in gown. Bed in low  rr5 

      position. Call light in reach. Side rails up X2. Cardiac monitor on. Pulse ox on. NIBP      

      on.                                                                                         

03:35 Door closed. Noise minimized. Lights dimmed. Warm blanket given. Pillow given.          rr5 

03:35 EKG done, by ED staff, reviewed by Ion Dexter MD.                                   rr5 

03:58 CT Head Brain wo Cont In Process Unspecified.                                           EDMS

04:11 Initiated transfer with [facility] per family request. Spoke with Lety. She said she  tt3 

      would reach out to the patients doctor and asked for me to reach out as well and call       

      back.                                                                                       

04:15 covid 19.                                                                               rr5 

04:20 Accessed Port-a-Cath. Clean \T\ dry. Dressing intact. Good blood return. Flushes easily.  rr5

04:21 Called Dr. Lan via the number provided by patients family. Spoke with Pam and she tt3 

      said she would page Dr. Lan and have him call back regarding patient transfer.            

04:25 X-ray(s) taken.                                                                         rr5 

04:27 Dr. Lan called and was transferred to speak with Dr. Dexter regarding the patients    tt3 

      case.                                                                                       

04:29 Called [facility] back and spoke with Kayy but was transferred to speak tt3 

      with Lety. Dr. Lan was going to call the transfer center as well and work on           

      getting the patient a bed.                                                                  

04:30 Chest Single View XRAY In Process Unspecified.                                          EDMS

05:13 Lety Noble called with Dr. Gonzales on the line to speak with Dr. Dexter.           tt3 

05:17 Lety Noble gave admin approval. Dr. Samy Gonzales is the accepting physician.     tt3 

      Face sheet faxed to [phone] per Lety's request. She stated once she got that        

      she would call back with a bed assignment and the number for the nurse to call report.      

05:36 No provider procedures requiring assistance completed.                                  rr5 

05:42 Lety Noble called back with the bed assignment and number for report. The patient tt3 

      is going to Main [address] [place] [address] Bed 781. The number for the nurse to call report is            

      [phone].                                                                              

07:14 Patient transferred, IV remains in place. intact, No redness/swelling at site.          rr5 

                                                                                                  

Administered Medications:                                                                         

04:25 Drug: NS 0.9% 1000 ml Route: IV; Rate: 1000 ml; Site: Port-a-cath;                      rr5 

05:31 Follow up: Response: No adverse reaction; IV Status: Completed infusion; IV Intake:     rr5 

      1000ml                                                                                      

04:26 Drug: ProTONIX 80 mg Route: IVP; Site: Port-a-cath;                                     rr5 

05:30 Follow up: Response: No adverse reaction                                                rr5 

                                                                                                  

                                                                                                  

Medication:                                                                                       

05:20 Blood products: PRBCs X 1 unit given. Type A negative, [id number] exp: ,  rr5 

      wristband [id number] See transfusion record.                                                  

                                                                                                  

Point of Care Testing:                                                                            

      Blood Glucose:                                                                              

03:44 Blood Glucose: 125 mg/dL;                                                               rr5 

      Guaiac:                                                                                     

03:46 Stool Guaiac: Positive; Stool Hemoccult Control: Pass;                                  rr5 

      Ranges:                                                                                     

                                                                                                  

Intake:                                                                                           

05:31 IV: 1000ml; Total: 1000ml.                                                              rr5 

07:10 IV: 250ml (Blood Products); Total: 1250ml.                                              rr5 

                                                                                                  

Output:                                                                                           

07:10 Urine: 300ml; Total: 300ml.                                                             rr5 

                                                                                                  

Outcome:                                                                                          

05:19 ER care complete, transfer ordered by MD.                                               [facility] 

07:14 Transferred by ground EMS to [facility], Transfer form completed.           rr5 

07:14 Condition: stable                                                                           

07:14 Instructed on the need for transfer.                                                        

07:16 Patient left the ED.                                                                    rr5 

                                                                                                  

Signatures:                                                                                       

Dispatcher [employer]                           EDMS                                                 

Isabel Garcia RN RN bb Smirch, Shelby, RN RN ss Vicente, Ronaldo, RN RN rv Roque, Raymond, RN RN   rr5                                                  

Ion Dexter MD MD   [facility]                                                  

Ward Ye                                  tt3                                                  

                                                                                                  

Corrections: (The following items were deleted from the chart)                                    

03:37 03:29 Chief complaint: EMS states: they were toned out for report of pt having syncopal bb  

      episode and fall in the bathroom pt was hypotensive on their arrival BP was 77 systolic     

      they started an IV and gave NS bb                                                           

                                                                                                  

**************************************************************************************************

## 2020-08-01 NOTE — EDPHYS
Physician Documentation                                                                           

 [facility]                                                                 

Name: Megan Julio                                                                         

Age: 69 yrs                                                                                       

Sex: Male                                                                                         

: 1950                                                                                   

MRN: [id number]                                                                                   

Arrival Date: 2020                                                                          

Time: 03:29                                                                                       

Account#: [id number]                                                                            

Bed 8                                                                                             

Private MD:                                                                                       

ED Physician Ion Dexter                                                                      

HPI:                                                                                              

                                                                                             

03:57 This 69 yrs old  Male presents to ER via EMS with complaints of Syncope.       mh7 

03:57 The patient has experienced syncope, collapsed.                                         mh7 

03:58 Onset: The symptoms/episode began/occurred today. Duration: This was a single episode,  mh7 

      that lasted an unknown period of time. Context: the episode(s) was witnessed, by no         

      one, occurred at home, occurred while the patient was.                                      

03:58 Context: occurred while the patient was standing, Just prior to the episode the patient mh7 

      experienced no apparent symptoms. Associated injury: The patient did not suffer any         

      apparent associated injury. Associated signs and symptoms: Pertinent positives:             

      weakness, generalized, Pertinent negatives: abdominal pain, agitation, ataxia, blurred      

      vision, chest pain, combativeness, confusion, diaphoresis, dizziness, headache,             

      lightheadedness, nausea, numbness, palpitations, seizure, shortness of breath,              

      tingling, vertigo, vomiting. Current symptoms: Currently, the patient is not                

      experiencing any symptoms, the patient feels back to baseline, no decreased level of        

      consciousness, no confusion, no dysphasia, no headache, no paralysis, no visual             

      changes. Patient reports black stools for the past 3 days..                                 

                                                                                                  

Historical:                                                                                       

- Allergies:                                                                                      

03:35 Sulfa (Sulfonamide Antibiotics);                                                        bb  

- Home Meds:                                                                                      

03:35 Hydrocodone-Acetaminophen Oral [Active];                                                bb  

- PMHx:                                                                                           

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  

                                                                                                  

- Immunization history:: Adult Immunizations unknown.                                             

- Social history:: Smoking status: Patient/guardian denies using tobacco, Stopped _               

  months ago 7.                                                                                   

                                                                                                  

                                                                                                  

ROS:                                                                                              

03:58 Constitutional: Negative for fever, chills, and weight loss, Eyes: Negative for injury, mh7 

      pain, redness, and discharge, ENT: Negative for injury, pain, and discharge, Neck:          

      Negative for injury, pain, and swelling, Cardiovascular: Negative for chest pain,           

      palpitations, and edema, Respiratory: Negative for shortness of breath, cough,              

      wheezing, and pleuritic chest pain, Back: Negative for injury and pain, : Negative        

      for injury, bleeding, discharge, and swelling, MS/Extremity: Negative for injury and        

      deformity, Skin: Negative for injury, rash, and discoloration, Psych: Negative for          

      depression, anxiety, suicide ideation, homicidal ideation, and hallucinations,              

      Allergy/Immunology: Negative for hives, rash, and allergies, Endocrine: Negative for        

      neck swelling, polydipsia, polyuria, polyphagia, and marked weight changes.                 

                                                                                                  

Exam:                                                                                             

03:58 Head/Face:  Normocephalic, atraumatic. Eyes:  Pupils equal round and reactive to light, mh7 

      extra-ocular motions intact.  Lids and lashes normal.  Conjunctiva and sclera are           

      non-icteric and not injected.  Cornea within normal limits.  Periorbital areas with no      

      swelling, redness, or edema.                                                                

03:58 ENT:  Nares patent. No nasal discharge, no septal abnormalities noted.  Tympanic            

      membranes are normal and external auditory canals are clear.  Oropharynx with no            

      redness, swelling, or masses, exudates, or evidence of obstruction, uvula midline.          

      Mucous membranes moist. Neck:  Trachea midline, no thyromegaly or masses palpated, and      

      no cervical lymphadenopathy.  Supple, full range of motion without nuchal rigidity, or      

      vertebral point tenderness.  No Meningismus. Chest/axilla:  Normal chest wall               

      appearance and motion.  Nontender with no deformity.  No lesions are appreciated.           

      Cardiovascular:  Regular rate and rhythm with a normal S1 and S2.  No gallops, murmurs,     

      or rubs.  Normal PMI, no JVD.  No pulse deficits. Respiratory:  Lungs have equal breath     

      sounds bilaterally, clear to auscultation and percussion.  No rales, rhonchi or wheezes     

      noted.  No increased work of breathing, no retractions or nasal flaring.                    

03:58 Back:  No spinal tenderness.  No costovertebral tenderness.  Full range of motion.          

03:58 MS/ Extremity:  Pulses equal, no cyanosis.  Neurovascular intact.  Full, normal range       

      of motion. Neuro:  Awake and alert, GCS 15, oriented to person, place, time, and            

      situation.  Cranial nerves II-XII grossly intact.  Motor strength 5/5 in all                

      extremities.  Sensory grossly intact.  Cerebellar exam normal.  Normal gait. Psych:         

      Awake, alert, with orientation to person, place and time.  Behavior, mood, and affect       

      are within normal limits.                                                                   

03:58 Constitutional: The patient appears alert, awake, frail, obviously ill.                     

03:58 Eyes: Conjunctiva: pale, bilaterally.                                                       

03:58 Abdomen/GI: Inspection: abdomen appears normal, Bowel sounds: normal, in all quadrants,     

      Palpation: abdomen is soft and non-tender, in all quadrants, Rectal exam: Stool: guaiac     

      positive, black, Indicators: McBurney's point is not tender, Villanueva's sign is negative,     

      Rovsing's sign is negative, Obturator sign is negative, Psoas sign is negative, Liver:      

      no appreciated palpable abnormalities, Hernia: not appreciated.                             

03:58 Skin:                                                                                       

04:46 ECG was reviewed by the Attending Physician.                                            mh7 

                                                                                                  

Vital Signs:                                                                                      

03:29 BP 85 / 66; Pulse 77; Resp 14 S; Temp 97.6(O); Pulse Ox 95% on R/A; Weight 65.77 kg     bb  

      (R); Height 6 ft. 1 in. (185.42 cm) (R);                                                    

03:44 BP 97 / 58; Pulse 75; Resp 16; Pulse Ox 99% ;                                           rr5 

04:30 BP 94 / 63; Pulse 68; Resp 14; Pulse Ox 100% ;                                          rr5 

04:40  / 64; Pulse 67; Resp 19; Pulse Ox 99% ;                                          rr5 

05:20 BP 93 / 70; Pulse 69; Resp 16; Temp 97.4; Pulse Ox 100% on R/A;                         rr5 

03:29 Body Mass Index 19.13 (65.77 kg, 185.42 cm)                                             bb  

05:20 suceeding VS please refer to BT form.                                                   rr5 

                                                                                                  

Cascade Coma Score:                                                                               

03:35 Eye Response: spontaneous(4). Verbal Response: oriented(5). Motor Response: obeys       rr5 

      commands(6). Total: 15.                                                                     

04:30 Eye Response: spontaneous(4). Verbal Response: oriented(5). Motor Response: obeys       rr5 

      commands(6). Total: 15.                                                                     

                                                                                                  

MDM:                                                                                              

03:35 Patient medically screened.                                                             mh7 

05:17 Differential Diagnosis: cardiac arrhythmia, GI bleed, idiopathic syncope, pseudo        mh7 

      seizure, seizure, sepsis, vasovagal episode. Data reviewed: vital signs, nurses notes,      

      EMS record, old medical records, lab test result(s), cardiac enzymes, CBC,                  

      electrolytes, urinalysis, EKG, radiologic studies, CT scan, plain films. Data               

      interpreted: Pulse oximetry: on room air is 99 %. Interpretation: normal. Counseling: I     

      had a detailed discussion with the patient and/or guardian regarding: the historical        

      points, exam findings, and any diagnostic results supporting the discharge/admit            

      diagnosis, lab results, radiology results, the need to transfer to another facility.        

      Response to treatment: the patient's symptoms have markedly improved after treatment.       

06:15 Special discussion:. ED course: .                                                                                                                                                    

03:36 Order name: Basic Metabolic Panel; Complete Time: 04:29                                 7 

                                                                                             

05:14 Interpretation: GLUC 136.                                                                                                                                                            

03:36 Order name: CBC with Diff; Complete Time: 04:29                                         7 

                                                                                             

03:36 Order name: Ckmb; Complete Time: 04:29                                                  7 

                                                                                             

03:36 Order name: CPK; Complete Time: 04:29                                                   7 

                                                                                             

03:36 Order name: Hepatic Function; Complete Time: 04:29                                      7 

                                                                                             

03:36 Order name: Lipase; Complete Time: 04:29                                                7 

                                                                                             

03:36 Order name: Magnesium; Complete Time: 04:29                                             7 

                                                                                             

03:36 Order name: Protime (+inr); Complete Time: 04:07                                        7 

                                                                                             

03:36 Order name: Ptt, Activated; Complete Time: 04:07                                        7 

                                                                                             

03:36 Order name: Troponin (emerg Dept Use Only); Complete Time: 04:29                        7 

                                                                                             

03:36 Order name: Type And Screen                                                             [facility] 

                                                                                             

03:46 Order name: Glucose, Ancillary Testing; Complete Time: 04:07                            EDMS

                                                                                             

03:52 Order name: COVID-19                                                                    rr5 

                                                                                             

04:04 Order name: CBC Smear Scan; Complete Time: 04:29                                        MS

                                                                                             

03:36 Order name: EKG; Complete Time: 03:37                                                   7 

                                                                                             

03:36 Order name: Cardiac monitoring; Complete Time: 03:43                                    7 

                                                                                             

03:36 Order name: EKG - Nurse/Tech; Complete Time: 03:43                                      7 

                                                                                             

03:36 Order name: IV Saline Lock; Complete Time: 03:43                                        [facility] 

                                                                                             

03:36 Order name: Labs collected and sent; Complete Time: 03:43                               7 

                                                                                             

03:36 Order name: NPO; Complete Time: 03:43                                                                                                                                                

03:36 Order name: O2 Per Protocol; Complete Time: 03:43                                                                                                                                    

03:36 Order name: O2 Sat Monitoring; Complete Time: 03:43                                     7 

                                                                                             

03:36 Order name: Urine Dipstick-Ancillary (obtain specimen); Complete Time: 07:13            [facility] 

                                                                                             

03:36 Order name: CT Head Brain wo Cont                                                       [facility] 

                                                                                             

04:08 Order name: Chest Single View XRAY                                                      [facility] 

                                                                                             

04:32 Order name: Packed RBC Leukored                                                         [facility]

                                                                                             

04:33 Order name: Guiac                                                                       [facility] 

                                                                                             

07:02 Order name: Urine Dipstick--Ancillary (enter results)                                   eb  

                                                                                                  

EC:46 Rate is 73 beats/min. Rhythm is regular, Normal Sinus Rhythm. QRS Axis is Normal. IL    mh7 

      interval is normal. QRS interval is normal. QT interval is normal. No Q waves. T waves      

      are Normal. No ST changes noted.                                                            

                                                                                                  

Administered Medications:                                                                         

04:25 Drug: NS 0.9% 1000 ml Route: IV; Rate: 1000 ml; Site: Port-a-cath;                      rr5 

05:31 Follow up: Response: No adverse reaction; IV Status: Completed infusion; IV Intake:     rr5 

      1000ml                                                                                      

04:26 Drug: ProTONIX 80 mg Route: IVP; Site: Port-a-cath;                                     rr5 

05:30 Follow up: Response: No adverse reaction                                                rr5 

                                                                                                  

                                                                                                  

Point of Care Testing:                                                                            

      Blood Glucose:                                                                              

03:44 Blood Glucose: 125 mg/dL;                                                               rr5 

      Guaiac:                                                                                     

03:46 Stool Guaiac: Positive; Stool Hemoccult Control: Pass;                                  rr5 

      Ranges:                                                                                     

      Critical Glucose Levels:Adult <50 mg/dl or >400 mg/dl  <40 mg/dl or >180 mg/dl       

Disposition:                                                                                      

20 05:19 Transfer ordered to [facility]. Diagnosis is GI Bleed.                       

- Reason for transfer: Higher level of care.                                                      

- Accepting physician is Dr. Maloney.                                                                

- Condition is Stable.                                                                            

- Problem is new.                                                                                 

- Symptoms have improved.                                                                         

                                                                                                  

                                                                                                  

                                                                                                  

Signatures:                                                                                       

Dispatcher [facility]                           Isabel Lucas RN                     RN   George Kasper RN                      RN   rr5                                                  

Ion Dexter MD MD   mh7                                                  

                                                                                                  

Corrections: (The following items were deleted from the chart)                                    

07:16 05:19 2020 05:19 Transfer ordered to [facility]. Diagnosis is GI Bleed.     rr5 

      Reason for transfer: Higher level of care. Accepting physician is Dr. Maloney. Condition       

      is Stable. Problem is new. Symptoms have improved. mh7                                      

                                                                                                  

**************************************************************************************************

## 2020-08-01 NOTE — XMS REPORT
Continuity of Care Document

                            Created on:2020



Patient:KAYLI DODSON

Sex:Male

:1950

External Reference #:[id number]





Demographics







                          Address                   [address] [place] [address]

 

                          Home Phone                [phone]

 

                          Mobile Phone              [phone]

 

                          Email Address             NONE

 

                          Preferred Language        English

 

                          Marital Status            Unknown

 

                          [place]ats[place] Affiliation     Unknown

 

                          Race                      Unknown

 

                          Additional Race(s)        Unavailable



                                                    White

 

                          Ethnic Group              Unknown









Author







                          Organization              [facility]

t

 

                          Address                   [address]



                                                    [place] [address]

 

                          Phone                     [phone]









Support







                Name            Relationship    Address         Phone

 

                LAUREANO  Spouse          Unavailable     [phone]

 

                Gena  Spouse          [address]  [phone]



                                                [place] [address] 









Care Team Providers







                    Name                Role                Phone

 

                    Sujit Harris MD  Primary Care Physician [phone]

 

                    [facility] Att[facility] Clinician Unavailable

 

                    Becka SANCHEZ        Attending Clinician Unavailable

 

                    Jose Mcgregor MD   Attending Clinician [phone]

 

                    Radhames SANCHEZ   Attending Clinician Unavailable

 

                    Oc SANCHEZ,  DEVON        Attending Clinician Unavailable

 

                    Osmel [facility]     Attending Clinician Unavailable

 

                    Navid Sharma [facility]         Attending Clinician Unavailable

 

                    Matty SWEET          Attending Clinician Unavailable

 

                    STEVE Raphael MD        Attending Clinician [phone]

 

                    Cristian Maloney MD      Attending Clinician [phone]

 

                    JOHNATHAN Cartwright MD      Attending Clinician [phone]

 

                    Michael Mott MD  Attending Clinician [phone]

 

                    Luis Wood RN     Attending Clinician Unavailable

 

                    Terrell [facility]            Attending Clinician Unavailable

 

                    Lola [facility]         Attending Clinician Unavailable

 

                    Suasnna ESCALERA            Attending Clinician [phone]

 

                    Jackelyn STEWART          Attending Clinician [phone]

 

                    Aniyah Spencer NP   Attending Clinician [phone]

 

                    Elton RN,  M     Attending Clinician Unavailable

 

                    Anson SWEET           Attending Clinician Unavailable

 

                    Amanda Gordillo RD Attending Clinician Unavailable

 

                    ANYI ALVAREZ    Attending Clinician Unavailable

 

                    Anyi Alvarez MD Attending Clinician [phone]

 

                    Linda Alba MD Attending Clinician [phone]

 

                    Alberta Crespo MD     Attending Clinician [phone]

 

                    Concha GUERRA  Carol Attending Clinician [phone]

 

                    Zachary ESCALERA          Attending Clinician [phone]

 

                    Tom RN         Attending Clinician Unavailable

 

                    Cherri ESCALERA,  Cristian    Attending Clinician [phone]

 

                    Karina Stephenson MD  Attending Clinician [phone]

 

                    Justice INFANTE        Attending Clinician Unavailable

 

                    Cornelius SANCHEZ       Attending Clinician Unavailable

 

                    Provider            Attending Clinician Unavailable

 

                    GREER Siddiqui MD   Attending Clinician [phone]

 

                    ALISA                Admitting Clinician Unavailable

 

                    CHERRI              Admitting Clinician Unavailable

 

                    ANYI ALVAREZ    Admitting Clinician Unavailable









Payers







           Payer Name Policy     Policy Number Effective  Expiration Source



                      Type                  Date       Date       

 

           [employer] [place]            xxxxxxxxxxxx 2017            [facility]/HMO BLUE                       00:00:00              [place]



           ESSENTIALSxxxxxxxxxxx                                             



           x9/2017-PresentO                                             

 

           BLUE CROSS/BLUE            xxxxxxxxxxxx                       [employer] H[employer]                                             - Medical



           BLUE/ESSENTIALSxxxxxx                                             [place]ocelynn

ter



           [id number]                                             



           [id number]                                             



           [id number]                                             



           [id number]                                             







Problems







       Condition Condition Condition Status Onset  Resolution Last   Treating Co

mments 

Source



       Name   Details Category        Date   Date   Treatment Clinician        



                                                 Date                 

 

       Dysphagia Dysphagia Disease Active -0                             [facility]

[facility]



                                   7-04                               [facility]



                                   00:00:                             st



                                   00                                 

 

       Esophageal Esophageal Disease Active 2020-0                      Overview

: [facility]



       dysphagia dysphagia               704                        Added  [facility]

kiran



                                   00:00:                      automatic st



                                                             ally from 



                                                               request 



                                                               for    



                                                               surgery 



                                                               [id number] 

 

       Hypotensio Hypotensio Disease Active 2020-0                             [facility]

[facility]



       n due to n due to                                              Method

i



       drugs  drugs                00:00:                             st



                                   00                                 

 

       Hematemesi Hematemesi Disease Active 2020-0                             C

[facility]



       s      s                    414                               [facility]



                                   00[facility]                             [facility]



                                   00                                 Center

 

       Dehydratio Dehydratio Disease Active 2020-0                             [facility]

[place]



       n      n                    4                               [facility]



                                   00:00:                             st



                                   00                                 

 

       GE     GE     Disease Active -0                             [facility]



       junction junction               3-25                               Method

i



       carcinoma carcinoma               00:00:                             st



                                   00                                 

 

       Liver  Liver  Disease Active -0                             [facility]



       metastases metastases               3-25                               Me

thodi



                                   00:00:                             st



                                   00                                 







History of Past Illness







       Condition Condition Condition Status Onset  Resolution Last   Treating Co

mments 

Source



       Name   Details Category        Date   Date   Treatment Clinician        



                                                 Date                 

 

       Gastrointe Gastrointe Disease Resolve 2020       

        [facility]



       stinal stinal        d      4-24   00:00:00 13:54:07               Method

i



       hemorrhage hemorrhage               00:00:                             st



       associated associated               00                                 



       with   with                                                    



       gastric gastric                                                  



       ulcer  ulcer                                                   

 

       Esophageal Esophageal Disease Resolve 2020       

        [facility]



       dysphagia dysphagia        d      3-25   00:00:00 13:54:05               

Methodi



                                   00:00:                             st



                                   00                                 







Allergies, Adverse Reactions, Alerts







       Allergy Allergy Status Severity Reaction(s) Onset  Inactive Treating Comm

ents 

Source



       Name   Type                        Date   Date   Clinician        

 

       Sulfur Propensi Active        Other (See                Flu like [facility]

I [facility]



              ty to                Comments) 4-14                 symptoms [facility]

 -



              adverse                      00:00:                      Medical



              reaction                      00                          Center



              s                                                       

 

       Sulfa  Propensi Active        Other (See                Flu like [facility]

[place]



       (Sulfona ty to                Comments) 3-25                 symptoms [facility]

[facility]



       mide   adverse                      00:00:                      st



       Antibiot reaction                      00                          



       ics)   s to                                                    



              drug                                                    







Family History







           Family Member Diagnosis  Comments   Start Date Stop Date  Source

 

           Natural father Heart attack                                  [facility] 

[facility]

 

           Natural mother Breast cancer                                  [facility]

 [facility]







Social History







           Social Habit Start Date Stop Date  Quantity   Comments   Source

 

           History of tobacco                       Current smoker            [facility]



           use                                                    

 

           History [facility] Meth

odist



           Alcohol Std Drinks                                             

 

           History [facility] Meth

odist



           Alcohol Binge                                             

 

           Sex Assigned At                                             [facility]

ethodist



           Birth                                                  

 

           Exposure to                       Yes                   [facility]

dist



           SARS-CoV-2 (event)                                             

 

           Cigarettes smoked 2020                       [facility]

 [place]



           current (pack per 00:00:00   00:00:00                         



           day) - Reported                                             

 

           Alcohol intake 2020 Lifetime              [facility]

thodist



                      00:00:00   00:00:00   non-drinker            



                                            (finding)             

 

           Tobacco Comment 2020 1 cigar daily            [facility]

 [facility]



                      00:[facility]00   00:00:00                         [facility]

 

           Alcohol Comment 2020 very rarely            [facility]

[facility]



                      00:00:00   00:00:00                         [facility] [place]

 

           History SDOH 2020 1                     [facility]

odist



           Alcohol Frequency 00:00:00   00:00:00                         









                Smoking Status  Start Date      Stop Date       Source

 

                Former smoker   2020 00:00:00 2020 00:00:00 [facility] 

[facility]

 

                Current every day 2020 00:00:00                 [facility]

es - Medical



                smoker                                          Center







Medications







       Ordered Filled Start  Stop   Current Ordering Indication Dosage Frequency

 Signature

                    Comments            Components          Source



     Medication Medication Date Date Medication? Clinician                (SIG) 

          



     Name Name                                                   

 

     HYDROcodone      -0 2020- Yes       chronic           chronic          

 [facility]



     -acetaminop      7-27 08-27           pain           pain. 1           [facility]

[facility]



     hen (Norco)      00:00: 23:59                          tab po q4h          

 st



      mg      00   :00                           prn            



     per tablet                                         moderate           



                                                  pain           

 

     HYDROcodone      -0 2020- No        chronic 1{tbl} Q4H  Take 1         

  [facility]



     -acetaminop      7-27 07-27           pain           tablet by           Me

thodi



     hen (Norco)      00:00: 00:00                          mouth           st



      mg      00   :00                           every 4           



     per tablet                                         (four)           



                                                  hours as           



                                                  needed for           



                                                  moderate           



                                                  pain for           



                                                  up to 100           



                                                  doses           



                                                  .chronic           



                                                  pain.           



                                                  Code:           



                                                  C16.0 Max           



                                                  Daily           



                                                  Amount: 6           



                                                  tablets           

 

     HYDROcodone      2020-0 2020- No        chronic 1{tbl} Q4H  Take 1         

  [facility]



     -acetaminop      7-27 07-27           pain           tablet by           Me

thodi



     hen (Norco)      00:00: 00:00                          mouth           st



      mg      00   :00                           every 4           



     per tablet                                         (four)           



                                                  hours as           



                                                  needed for           



                                                  moderate           



                                                  pain for           



                                                  up to 100           



                                                  doses           



                                                  .chronic           



                                                  pain.           



                                                  Code:           



                                                  C16.0 Max           



                                                  Daily           



                                                  Amount: 6           



                                                  tablets           

 

     HYDROcodone      2020-0 2020- No        chronic           chronic          

 [facility]



     -acetaminop      7-27 07-27           pain           pain.           Method

i



     hen (Norco)      00:00: 00:00                          Code:           st



      mg      00   :00                           C16.01 tab           



     per tablet                                         po q4h prn           



                                                  moderate           



                                                  pain           

 

     aspirin      2020-0      Yes            81mg QD   Take 81 mg           [facility]

[facility]



     (ECOTRIN)      -07                               by mouth           Method

i



     81 MG      12:03:                               daily.           st



     enteric      56                                                



     coated                                                        



     tablet                                                        

 

     ondansetron      2020-0      Yes            8mg  Q.5D Take 8 mg           [facility]

[place]



     ODT       7-07                               by mouth 2           Methodi



     (ZOFRAN-ODT      12:03:                               (two)           st



     ) 8 MG      56                                 times a           



     disintegrat                                         day.           



     ing tablet                                                        

 

     pantoprazol      2020-0      Yes            40mg Q.5D Take 1           [facility]



     e         4-17                               tablet (40           [facility] -



     (PROTONIX)      00:00:                               mg total)           Me

dical



     40 MG      00                                 by mouth 2           Center



     tablet                                         (two)           



                                                  times           



                                                  daily.           

 

     pantoprazol      -0      Yes            40mg Q.5D Take 40 mg           

[facility]



     e         4-17                               by mouth 2           Methodi



     (PROTONIX)      00:00:                               (two)           st



     40 MG EC      00                                 times a           



     tablet                                         day.           

 

     traMADoL      -0      Yes            50mg      Take 50 mg           [facility]

[facility]



     (ULTRAM) 50      08                               by mouth.           Met

hodi



     mg tablet      00:00:                                              st



               00                                                

 

     dronabinoL      -2020- No             2.5mg Q.5D Take 1           [facility]

[facility]



     (Marinol)      -18                          capsule           Method

i



     2.5 MG      00:00: 23:59                          (2.5 mg           st



     capsule      00   :00                           total) by           



                                                  mouth 2           



                                                  (two)           



                                                  times a           



                                                  day before           



                                                  meals for           



                                                  10 days.           

 

     aspirin 81      -0      Yes            81mg QD   Take 81 mg           C

[facility]



     MG EC      407                               by mouth           [facility]



     tablet      09:41:                               daily.           Medical



               49                                                Center

 

     combination      -0      Yes                                     [facility]



     formulary                                                    [facility]



     medication      10:44:                                              Medical



               43                                                Center

 

     lisinopriL      -0      Yes            5mg  QD   Take 5 mg           [facility]

I [facility]



     (PRINIVIL,Z      02                               by mouth           [facility]

s -



     ESTRIL) 5      10:04:                               daily.           Medica

l



     MG tablet      59                                                Center

 

     ondansetron      0      Yes            8mg       Take 8 mg           C

[facility]



     (ZOFRAN-ODT                                     by mouth 2           [facility]

[facility] -



     ) 8 MG      10:04:                               (two)           Medical



     disintegrat      59                                 times           Center



     ing tablet                                         daily as           



                                                  needed for           



                                                  Nausea.           

 

     prochlorper      -2020- No        GE junction 5mg  Q6H  Take 1       

    [facility]



     azine      3-26 04-25           carcinoma           tablet (5           Met

hodi



     (Compazine)      00:00: 23:59           (HCC)           mg total)          

 st



     5 MG tablet      00   :00                           by mouth           



                                                  every 6           



                                                  (six)           



                                                  hours as           



                                                  needed for           



                                                  nausea or           



                                                  vomiting           



                                                  for up to           



                                                  30 days.           

 

     ondansetron      2020- No        GE junction 8mg  Q8H  Take 1       

    [facility]



     ODT (Zofran      3-26 04-25           carcinoma           tablet (8        

   Methodi



     ODT) 8 MG      00:00: 23:59           (HCC)           mg total)           s

t



     disintegrat      00   :00                           by mouth           



     ing tablet                                         every 8           



                                                  (eight)           



                                                  hours as           



                                                  needed for           



                                                  nausea or           



                                                  vomiting           



                                                  for up to           



                                                  30 days.           

 

     lisinopriL      2020- No                                      [place]

n



     (PRINIVIL)      1- 05                                         Methodi



     5 mg tablet      00:00: 00:00                                         st



               00   :00                                          







Vital Signs







             Vital Name   Observation Time Observation Value Comments     Source

 

             Systolic blood 2020 14:09:00 99 mm[Hg]                 [place]

[place] [facility]



             pressure                                            

 

             Diastolic blood 2020 14:09:00 59 mm[Hg]                 [place]

[place] [facility]



             pressure                                            

 

             Heart rate   2020 14:09:00 73 /min                   [place] 

[facility]

 

             Body temperature 2020 14:09:00 35.78 Ashley                 [place] [facility]

 

             Respiratory rate 2020 14:09:00 17 /min                   [place]

[place] [facility]

 

             Body height  2020 14:09:00 185.4 cm                  [place] 

[facility]

 

             Body weight  2020 14:09:00 66.769 kg                 [place] 

[facility]

 

             BMI          2020 14:09:00 19.42 kg/m2               [place] 

[facility]

 

             Oxygen saturation in 2020 14:09:00 98 /min                   

[facility]



             Arterial blood by                                        



             Pulse oximetry                                        

 

             Systolic blood 2020 07:50:00 110 mm[Hg]                [facility]

 

             Diastolic blood 2020 07:50:00 77 mm[Hg]                 [facility]

 

             Heart rate   2020 07:50:00 86 /min                   [facility]

 

             Body temperature 2020 07:50:00 36.94 Ashley                 [facility]

 

             Respiratory rate 2020 07:50:00 18 /min                   [facility]

 

             Oxygen saturation in 2020 07:50:00 95 /min                   

[facility]



             Arterial blood by                                        Medical Ce

nter



             Pulse oximetry                                        

 

             Body weight Measured 2020 06:00:00 72.757 kg                 

[facility]

 

             BMI          2020 06:00:00 21.16 kg/m2               [facility]

 

             Body height  2020 01:30:00 185.4 cm                  [facility]







Procedures







                Procedure       Date / Time     Performing      Source



                                Performed       Clinician       

 

                COMPREHENSIVE METABOLIC PANEL 2020      Maggie Mcgregor

 [facility]



                                09:34:00                        [facility]

 

                HC COMPLETE BLD COUNT W/AUTO DIFF 2020      Maggie Mcgregor Ed [place]



                                09:34:00                        [facility]

 

                MAGNESIUM LEVEL 2020      Maggie Mcgregor [place]



                                09:34:00                        [facility]

 

                ESTIMATED GFR   2020      Maggie Mcgregor [place]



                                09:34:00                        [facility]

 

                SMEAR REVIEW    2020      Maggie Mcgregor [place]



                                09:34:00                        [facility]

 

                HC COMPLETE BLD COUNT W/AUTO DIFF 2020      Maggie Mcgregor Ed

[facility]



                                09:40:00                        [facility]

 

                COMPREHENSIVE METABOLIC PANEL 2020      Maggie Mcgregor

 [place]



                                09:40:00                        [facility]

 

                MAGNESIUM LEVEL 2020      [facility]



                                09:40[facility]00                        [facility]

 

                ESTIMATED GFR   2020      [facility]



                                09:40:00                        [facility]

 

                SMEAR REVIEW    2020      Maggie Mcgregor [place]



                                09:40:00                        [facility]

 

                CV CARDIAC EVENT MONITOR 2020      [facility]



                                00:00:00        Brijesh           [facility]

 

                BASIC METABOLIC PANEL 2020      Sal Raphael    [place]



                                04:27:00                        [facility]

 

                HC COMPLETE BLD COUNT W/AUTO DIFF 2020      Sal Raphael  

  [place]



                                04:27:00                        [facility]

 

                ESTIMATED GFR   2020      Sal Raphael    [place]



                                04:27:00                        [facility]

 

                FL < 1 HOUR     2020      Kacie Cartwright [place]



                                11:34:50                        [facility]

 

                AR AN ELECTIVE ENDOTRACHEAL AIRWAY 2020      [facility]



                                11:00:40        Bradford Rodriguez [facility]

 

                ESOPHAGOGASTRODUODENOSCOPY (EGD) 2020      Kacie Cartwright

 [place]



                                10:30:00                        [facility]

 

                HC COMPLETE BLD COUNT W/AUTO DIFF 2020      Sal Raphael  

  [place]



                                03:54:00                        [facility]

 

                BASIC METABOLIC PANEL 2020      Sal Raphael    [place]



                                03:54:00                        [facility]

 

                MAGNESIUM LEVEL 2020      Sal Raphael    [place]



                                03:54:00                        [facility]

 

                PHOSPHORUS LEVEL 2020      Sal Raphael    [place]



                                03:54:00                        [facility]

 

                T4, FREE        2020      Sal Raphael    [place]



                                03:54:00                        [facility]

 

                THYROID STIMULATING HORMONE 2020      Sal Raphael    [facility]



                                03:54:00                        [facility]

 

                ESTIMATED GFR   2020      Sal Raphael    [place]



                                03:54:00                        [facility]

 

                HC COMPLETE BLD COUNT W/AUTO DIFF 2020      Kunal Maloney

[facility]



                                04:30:00                        [facility]

 

                COMPREHENSIVE METABOLIC PANEL 2020      Maloney [facility]



                                04:30:00                        [facility]

 

                MAGNESIUM LEVEL 2020      Maloney, [facility]



                                04:30:00                        [facility]

 

                PHOSPHORUS LEVEL 2020      Maloney, [facility]



                                04:30:00                        [facility]

 

                ESTIMATED GFR   2020      Maloney, [facility]



                                04:30:00                        [facility]

 

                XR [facility]BDOMEN 1 VW 2020      [facility]



                                22:58:06                        [facility]

 

                COVID-19 QUALITATIVE PCR 2020      [facility]

n



                                22:40:00                        [facility]

 

                HC COMPLETE BLD COUNT W/AUTO DIFF 2020      Kunal Maloney [facility]



                                21:30:00                        [facility]

 

                PROTHROMBIN TIME WITH INR 2020      [facility]

on



                                21:30:00                        [facility]

 

                PARTIAL THROMBOPLASTIN TIME (PTT) 2020      Kunal Maloney [facility]



                                21:30:00                        [facility]

 

                COMPREHENSIVE METABOLIC PANEL 2020      [facility]



                                21:30:00                        [facility]

 

                MAGNESIUM LEVEL 2020      [facility]



                                21:30:00                        [facility]

 

                PHOSPHORUS LEVEL 2020      Maloney, [facility]



                                21:30:00                        [facility]

 

                ESTIMATED GFR   2020      Maloney [facility]



                                21:30:00                        [facility]

 

                COMPREHENSIVE METABOLIC PANEL 2020      Maggie Mcgregor

 [place]



                                10:04:00                        [facility]

 

                HC COMPLETE BLD COUNT W/AUTO DIFF 2020      Maggie Mcgregor Ed [place]



                                10:04:00                        [facility]

 

                MAGNESIUM LEVEL 2020      Maggie Mcgregor [place]



                                10:04:00                        [facility]

 

                ESTIMATED GFR   2020      Maggie Mcgregor [place]



                                10:04:00                        [facility]

 

                COMPREHENSIVE METABOLIC PANEL 2020-06-15      Maggie Mcgregor

 [place]



                                09:15:00                        [facility]

 

                HC COMPLETE BLD COUNT W/AUTO DIFF 2020-06-15      Maggie Mcgregor [facility]



                                09:15:00                        [facility]

 

                MAGNESIUM LEVEL 2020-06-15      Maggie Mcgregor [facility] [place]



                                09:15:00                        [facility]

 

                ESTIMATED GFR   2020-06-15      Maggie Mcgregor [facility] [place]



                                09:15:00                        [facility]

 

                FL < 1 HOUR     2020-06-10      Kacie CartwrightWayne [place]



                                08:47:00                        [facility]

 

                ESOPHAGOGASTRODUODENOSCOPY (EGD) 2020-06-10      Cartwright, Kacie MANN

 [place]



                                07:52:00                        [facility]

 

                CT CHEST W CONTRAST ABDOMEN W 2020      Riky Maggie Edward

 [place]



                CONTRAST PELVIS W CONTRAST 10:33:55                        [facility]

dist

 

                COMPREHENSIVE METABOLIC PANEL 2020      Maggie Mcgregor Edward

 [place]



                                09:59:00                        [facility]

 

                HC COMPLETE BLD COUNT W/AUTO DIFF 2020      Maggie Mcgregor [facility]



                                09:59:00                        [facility]

 

                MAGNESIUM LEVEL 2020      Maggie Mcgregor [facility] [place]



                                09:59:00                        [facility]

 

                ESTIMATED GFR   2020      Maggie lorenzana [facility] [place]



                                09:59:00                        [facility]

 

                ECG 12-LEAD     2020-05-15      [facility]



                                00:00:00        Brijesh           [facility]

 

                COMPREHENSIVE METABOLIC PANEL 2020      Maggie lorenzana Edward

 [place]



                                09:23:00                        [facility]

 

                HC COMPLETE BLD COUNT W/AUTO DIFF 2020      Maggie lorenzana [facility]



                                09:23:00                        [facility]

 

                MAGNESIUM LEVEL 2020      Maggie lorenzana [facility] [place]



                                09:23:00                        [facility]

 

                ESTIMATED GFR   2020      Maggie Mcgregor [facility] [place]



                                09:23:00                        [facility]

 

                TTE COMPLETE, WO CONTRAST, W 2020      Maggie lorenzana Edward 

[place]



                DOPPLER ([id number]) 11:08:48                        [facility]

 

                VITAMIN B12 LEVEL 2020      Maggie Mcgregor [facility] [place]



                                09:43:00                        [facility]

 

                FOLATE LEVEL    2020      Maggie Mcgregor [facility] [place]



                                09:43:00                        [facility]

 

                COMPREHENSIVE METABOLIC PANEL 2020      Maggie lorenzana Edward

 [place]



                                09:42:00                        [facility]

 

                HC COMPLETE BLD COUNT W/AUTO DIFF 2020      Maggie Mcgregor Ed [place]



                                09:42:00                        [facility]

 

                MAGNESIUM LEVEL 2020      Maggie Mcgregor [place]



                                09:42:00                        [facility]

 

                FERRITIN LEVEL  2020      Maggie Mcgregor



                                09:42:00                        [facility]

 

                TOTAL IRON BINDING CAPACITY 2020      Maggie Mcgregor



                                09:42:00                        [facility]

 

                ESTIMATED GFR   2020      Maggie Mcgregor



                                09:42:00                        [facility]

 

                REPORT OF PROCEDURE - ENDOSCOPY 2020      Provider, Piyush barrios [facility] -



                SCAN            15:40:38        [facility]

 

                RHYTHM STRIP - SCAN 2020      Provider, [facility]

es -



                                15:40:28        [facility]

 

                TRANSFUSION SERVICE REPORT - SCAN 2020      Provider, Vinh cline [facility] -



                                18:01:21        [facility]

 

                CBC W/PLT COUNT & AUTO 2020      [facility]amil[facility]respo [facility] -



                DIFFERENTIAL    03:54:00        [facility]

 

                TRANSFUSION SERVICE REPORT - SCAN 2020      Provider, Vinh cline [facility] -



                                17:50:55        [facility]

 

                CBC (HEMOGRAM ONLY) 2020      Verna       [facility]

 -



                                12:37:00        [facility]

 

                CBC (HEMOGRAM ONLY) 2020      [facility]

 -



                                05:43:00        [facility]

 

                BASIC METABOLIC PANEL (7) 2020      [facility]



                                05:42:00                        [facility]

 

                CBC (HEMOGRAM ONLY) 2020      [facility]

 -



                                00:07:00        [facility]

 

                PREPARE LEUKO-REDUCED RBC 2020-04-15      [facility]



                                23:54:00                        [facility]

 

                CBC (HEMOGRAM ONLY) 2020-04-15      [facility]

 -



                                18:01:00        [facility]

 

                TRANSFUSION SERVICE REPORT - SCAN 2020-04-15      ProviderVinh [facility] -



                                17:51:06        [facility]

 

                CBC (HEMOGRAM ONLY) 2020-04-15      [facility]

 -



                                12:42:00        [facility]

 

                POCT-GLUCOSE METER 2020-04-15      Parth,         [facility] 

-



                                05:30:00        [facility]        

 

                CBC (HEMOGRAM ONLY) 2020-04-15      Verna       [facility]

 -



                                04:25:00        [facility]

 

                BASIC METABOLIC PANEL (7) 2020-04-15      [facility]



                                04:25:00                        [facility]

 

                CBC (HEMOGRAM ONLY) 2020      Verna,       [facility]

 -



                                23:33:00        [facility]

 

                POCT-GLUCOSE METER 2020      Parth,         [facility] 

-



                                23:32:00        [facility]        

 

                POCT-GLUCOSE METER 2020      Parth,         [facility] 

-



                                18:14:00        [facility]        

 

                POCT-GLUCOSE METER 2020      Anyi Alvarez, [facility]

s -



                                15:34:00        [facility]

 

                CBC (HEMOGRAM ONLY) 2020      Paramjit Mayo [facility]

s -



                                15:25:00                        [facility]

 

                REPORT OF PROCEDURE - ENDOSCOPY 2020      Jj Sharma [facility] -



                URL             12:49:16                        [facility]

 

                UPPER ENDOSCOPY 2020      Jj Sharma [facility] -



                                11:00:00                        [facility]

 

                CBC (HEMOGRAM ONLY) 2020      Gael, Paramjit WADE [facility]

s -



                                08:25:00                        [facility]

 

                TRANSFUSE LEUKO-REDUCED RED BLOOD 2020      Gael, Paramjit

 W [facility] -



                CELLS           07:19:43                        [facility]

 

                HEMOGLOBIN A1C  2020      Gael, Paramjit W [facility] -



                                04:55:00                        [facility]

 

                BLOOD CULTURE   2020      Gael, Paramjit W [facility] -



                                04:54:00                        [facility]

 

                ABORH, MANUAL   2020      Reyes, Meredith [facility] -



                                03:11:00        [facility]

 

                BLOOD CULTURE   2020      Gael, Paramjit W [facility] -



                                02:53:00                        [facility]

 

                TYPE AND SCREEN, AUTOMATED 2020      Gael, [facility]it [facility] -



                                02:52:00                        [facility]

 

                COMPREHENSIVE METABOLIC PANEL 2020      Gael, Paramjit W C

[facility] -



                                02:40:00                        [facility]

 

                MAGNESIUM       2020      Gael, Paramjit WADE [facility] -



                                02:40:00                        [facility]

 

                LACTIC ACID, VENOUS 2020      Gael, Paramjit WADE [facility]

s -



                                02:40:00                        [facility]

 

                PROTHROMBIN TIME/INR 2020      Gael, Par[facility]jit [facility]ADE [facility]

es -



                                02:40:00                        [facility]

 

                CBC W/PLT COUNT & AUTO 2020      Gael, [facility]it [facility]ADE [facility]

[facility] -



                DIFFERENTIAL    02:40:00                        [facility]

 

                XR CHEST 1 VIEW PORTABLE/BEDSIDE 2020      Gael, Paramjit WADE [facility] -



                                02:28:00                        [facility]

 

                REPORT OF PROCEDURE - ENDOSCOPY 2020      Kacie Cartwright Min

h [facility] -



                URL             11:35:29                        [facility]

 

                FL FLUORO NON-SPECIFIC UP TO 1 2020      CartwrightKacie saunders

 [facility] -



                HOUR            11:30:00                        [facility]

 

                UPPER ENDOSCOPY,WALL STENT 2020      Cartwright[facility]acie elizondo [facility] -



                                11:00:00                        [facility]

 

                PROCEDURE W/ C-ARM 2020      Cartwright, K[facility]ie Merinoh [facility]

s -



                                11:00:00                        [facility]

 

                COMPREHENSIVE METABOLIC PANEL 2020      Maggie Mcgregor

 [place]



                                09:25:00                        [facility]

 

                HC COMPLETE BLD COUNT W/AUTO DIFF 2020      Maggie Mcgregor [facility] [place]



                                09:25:00                        [facility]

 

                MAGNESIUM LEVEL 2020      Maggie Mcgregor [place]



                                09:25:00                        [facility]

 

                ESTIMATED GFR   2020      Maggie Mcgregor [place]



                                09:25:00                        [facility]

 

                IR PORT PLACEMENT 2020      Maggie Mcgregor [place]



                                11:36:04                        [facility]

 

                XR CHEST 2 VW   2020      Maggie Mcgregor [place]



                                12:36:44                        [facility]

 

                HC COMPLETE BLD COUNT W/AUTO DIFF 2020      Maggie Mcgregor Ed [place]



                                12:08:00                        [facility]

 

                COMPREHENSIVE METABOLIC PANEL 2020      Maggie Mcgregor

 [place]



                                12:08:00                        [facility]

 

                CARCINOEMBRYONIC ANTIGEN (CEA) 2020      Maggie Mcgregor

[facility]



                                12:08:00                        [facility]

 

                CANCER ANTIGEN 19-9 2020      Maggie Mcgregor



                                12:08:00                        [facility]

 

                PROTHROMBIN TIME WITH INR 2020      [place]ie Mcgregor

[place]



                                12:08:00                        [facility]

 

                PARTIAL THROMBOPLASTIN TIME (PTT) 2020      Maggie Mcgregor Ed



                                12:08:00                        [facility]

 

                ESTIMATED GFR   2020      Maggie Mcgregor



                                12:08:00                        [facility]

 

                CT ABD/PELVIC EXTERNAL STUDY 2020      Maggie Mcgregor 

[place]



                                08:38:00                        [facility]

 

                XR CHEST EXTERNAL STUDY 2020      Maggie Mcgregor

on



                                08:11:00                        [facility]







Plan of Care







             Planned Activity Planned Date Details      Comments     Source

 

             Future Scheduled 2020   INFLUENZA VACCINE              [place]

n [facility]



             Test         00:00:00     [code = INFLUENZA              



                                       VACCINE]                  

 

             Future Scheduled 2013   65+ PNEUMOCOCCAL              [place]

 [facility]



             Test         00:00:00     VACCINE (1 of 2 -              



                                       PCV13) [code = 65+              



                                       PNEUMOCOCCAL VACCINE              



                                       (1 of 2 - PCV13)]              

 

             Future Scheduled 1998   COLONOSCOPY SCREENING              [facility]

[place] [facility]



             Test         00:00:00     [code = COLONOSCOPY              



                                       SCREENING]                

 

             Future Scheduled 1998   SHINGLES VACCINES (#1)              H

ellis [facility]



             Test         00:00:00     [code = SHINGLES              



                                       VACCINES (#1)]              







Encounters







        Start   End     Encounter Admission Attending Care    Care    Encounter 

Source



        Date/Time Date/Time Type    Type    Clinicians Facility Department ID   

   

 

        2020 Outpatient         MAGGIE MCGREGOR [facility]     210

[id number] [facility]



        00:00:00 00:00:00                                         837     [facility]

i



                                                                        st

 

        2020 Outpatient         MAGGIE MCGREGOR [facility]     210

[id number] [facility]



        00:00:00 00:00:00                                         629     [facility]

i



                                                                        st

 

        2020 Outpatient         MAGGIE MCGREGOR [facility]     210

[id number] [facility]



        00:00:00 00:00:00                                         918     Method

i



                                                                        st

 

        2020 Inpatient         SAL RAPHAEL [facility]     012     [id number]

[id number] [facility]



        00:00:00 00:00:00                                         023     Method

i



                                                                        st

 

        2020 Outpatient         MANINDERMEHNAZMAGGIE [facility]     210

[id number] [facility]



        00:00:00 00:00:00                                         994     [facility]

i



                                                                        st

 

        2020 Outpatient         MAGGIE MCGREGOR [facility]     210

[id number] [facility]



        00:00:00 00:00:00                                         000     Method

i



                                                                        st

 

        2020 Outpatient         MAGGIE MCGREGOR [facility]     210

[id number] [facility]



        00:00:00 00:00:00                                         917     Method

i



                                                                        st

 

        2020 Outpatient         MAGGIE MCGREGOR [facility]     210

[id number] [facility]



        00:00:00 00:00:00                                         553     [facility]

i



                                                                        st

 

        2020-06-15 2020-06-15 Outpatient         MAGGIE MCGREGOR [facility]     210

[id number] [facility]



        00:00:00 00:00:00                                         346     Method

i



                                                                        st

 

        2020-06-10 2020-06-10 Outpatient         CHERRI [facility]     021     [id number]

[id number] [facility]



        00:00:00 00:00:00                 ANDINO                    322     Method

i



                                                                        st

 

        2020 Outpatient         MAGGIE MCGREGOR [facility]     210

[id number] [facility]



        00:00:00 00:00:00                                         186     Method

i



                                                                        st

 

        2020 Outpatient         MAGGIE MCGREGOR [facility]     210

[id number] [facility]



        00:00:00 00:00:00                                         081     Method

i



                                                                        st

 

        2020 Outpatient         MAGGIE MCGREGOR [facility]     210

[id number] [facility]



        00:00:00 00:00:00                                         993     Method

i



                                                                        st

 

        2020 Outpatient         MAGGIE MCGREGOR [facility]     210

[id number] [facility]



        00:00:00 00:00:00                                         422     Method

i



                                                                        st

 

        2020-05-15 2020-05-15 Outpatient         MAGGIE MCGREGOR [facility]     210

[id number] [facility]



        00:00:00 00:00:00                                         823     Method

i



                                                                        st

 

        2020-05-15 2020-05-15 Outpatient                 [facility]     [id number]

[id number] [facility]



        00:00:00 00:00:00                                         715     Method

i



                                                                        st

 

        2020 Outpatient         MAGGIE MCGREGOR [facility]     210

[id number] [facility]



        00:00:00 00:00:00                                         774     Method

i



                                                                        st

 

        2020 Outpatient         MAGGIE MCGREGOR [facility]     210

[id number] [facility]



        00:00:00 00:00:00                                         435     Method

i



                                                                        st

 

        2020 Outpatient         MAGGIE MCGREGOR [facility]     210

[id number] [facility]



        00:00:00 00:00:00                                         013     Method

i



                                                                        st

 

        2020 Outpatient         MAGGIE MCGREGOR [facility]     210

[id number] [facility]



        00:00:00 00:00:00                                         375     Method

i



                                                                        st

 

        2020 Outpatient         MAGGIE MCGREGOR [facility]     210

[id number] [facility]



        00:00:00 00:00:00                                         484     Method

i



                                                                        st

 

        2020 Outpatient         MAGGIE MCGREGOR [facility]     210

[id number] [facility]



        00:00:00 00:00:00                                         177     Method

i



                                                                        st

 

        2020 Outpatient         MAGGIE MCGREGOR [facility]     210

[id number] [facility]



        00:00:00 00:00:00                                         740     Method

i



                                                                        st

 

        2020 Outpatient         MAGGIE MCGREGOR [facility]     210

[id number] [facility]



        00:00:00 00:00:00                                         551     Method

i



                                                                        st

 

        2020 Outpatient         MAGGIE MCGREGOR [facility]     210

[id number] [facility]



        00:00:00 00:00:00                                         488     Method

i



                                                                        st

 

        2020 Outpatient         MAGGIE MCGREGOR [facility]     210

[id number] [facility]



        00:00:00 00:00:00                                         301     Method

i



                                                                        st

 

        2020 Outpatient         MAGGIE MCGREGOR [facility]     210

[id number] [facility]



        00:00:00 00:00:00                                         915     Method

i



                                                                        st

 

        2020 Outpatient         MAGGIE MCGREGOR [facility]     210

[id number] [facility]



        00:00:00 00:00:00                                         200     Method

i



                                                                        st

 

        2020 Outpatient         MAGGIE MCGREGOR [facility]     210

[id number] [facility]



        00:00:00 00:00:00                                         821     Method

i



                                                                        st

 

        2020 Outpatient         MAGGIE MCGREGOR [facility]     210

[id number] [facility]



        00:00:00 00:00:00                                         880     Method

i



                                                                        st

 

        2020 Outpatient         MIKE [facility]     2100

[id number] [facility]



        00:00:00 00:00:00                 SAMIRA                    464     Method

i



                                                                        st







Results







           Test Description Test Time  Test Comments Results    Result Comments 

Source









                    Smear review        2020 10:44:10 









                      Test Item  Value      Reference Range Interpretation Comme

nts









             Platelet slide review (test code = 59466-3) Decreased              

   A            

 

             Anisocytosis (test code = 702-1) Moderate                          

     

 

             Ovalocytes (test code = 774-0) Moderate                            

   

 

             Lab Interpretation (test code = 43102-1) Abnormal                  

             



[facility] [id number] 10:41:04





             Test Item    Value        Reference Range Interpretation Comments

 

             Sodium (test code = 137          135- 148 mEq/L              



             2951-2)                                             

 

             Potassium (test code = 4.3          3.5- 5.0 mEq/L              



             2823-3)                                             

 

             Chloride (test code = 105          98- 112 mEq/L              



             2075-0)                                             

 

             CO2 (test code = -9) 20           24- 31 mEq/L L            

 

             Anion gap (test code = 12@ANIO      7- 15 mEq/L               



             34514-1)                                            

 

             BUN (test code = 3094-0) 15 mg/dL     8-23                      

 

             Creatinine (test code = 0.69 mg/dL   0.7-1.2      L            



             2160-0)                                             

 

             Glucose (test code = 81 mg/dL     65-99                     



             2345-7)                                             

 

             Calcium (test code = 8.2 mg/dL    8.8-10.2     L            



             38281-8)                                            

 

             Protein (test code = 5.7 g/dL     6.3-8.3      L            -[place]

n



             2885-2)                                                    4.6-7.0



                                                                 g/dL1 week



                                                                           4.4-7

.6



                                                                 g/dL7 months-1y

ear



                                                                           5.1-7

.3



                                                                 g/dL1-2 years



                                                                           5.6-7

.5



                                                                 g/dL>3 years



                                                                           6.0-8

.0



                                                                 g/uO76-672



                                                                           6.3-8

.3



                                                                 g/dL

 

             Albumin (test code = 2.4 g/dL     3.5-5        L            



             1751-7)                                             

 

             A/G ratio (test code = 0.7          0.7-3.8                   



             1759-0)                                             

 

             Alkaline phosphatase 114 U/L                          



             (test code = 6768-6)                                        

 

             AST (test code = 1920-8) 31 U/L       10-50                     

 

             ALT (test code = 1742-6) 20 U/L       5-50                      

 

             Total bilirubin (test 0.3 mg/dL    0-1.2                     



             code = 1975-2)                                        

 

             Lab Interpretation (test Abnormal                               



             code = 17139-3)                                        



[facility] MethodistMagnesium [id number] 10:40:59





             Test Item    Value        Reference Range Interpretation Comments

 

             Magnesium (test code = 65496-9) 1.9 mg/dL    1.6-2.4               

    



Mark MethodistEstimated [id number] 10:40:58





             Test Item    Value        Reference Range Interpretation Comments

 

             Estimated GFR (test >=90         mL/min/1.73 m2              Catmario peterson  Units



             code = 5488)                                        InterpretationG

1



                                                                 >=90     Normal

 or highG2



                                                                       60-89    

Mildly



                                                                 qdxhgfqkgY5h   

     45-59



                                                                  Mildly to mode

rately



                                                                 rawrnbzmlB6a   

     30-44



                                                                  Moderately to 

severely



                                                                 decreasedG4    

     15-29



                                                                  Severely decre

asedG5



                                                                   <15      Kidn

ey



                                                                 failureThe eGFR

 was



                                                                 calculated bindu hu the



                                                                 Chronic Kidney 

Disease



                                                                 Epidemiology Co

llaboration



                                                                 (CKD-EPI) equat

ion.



                                                                 Interpretation 

is based on



                                                                 recommendations

 of the



                                                                 National Kidney



                                                                 Foundation-Kidn

ey Disease



                                                                 Outcomes Qualit

y Initiative



                                                                 (NKF-KDOQI) pub

lished in



                                                                 2014.



[facility] MethodistCBC with platelet and ltvgpgthaqgd8632-62-17 10:07:35





             Test Item    Value        Reference Range Interpretation Comments

 

             WBC (test code = 49040-9) 3.39         4.50- 11.00 k/uL L          

  

 

             RBC (test code = 87203-3) 3.54 m/uL    4.4-6        L            

 

             HGB (test code = 718-7) 10.5 g/dL    14-18        L            

 

             HCT (test code = 4544-3) 33.6 %       41-51        L            

 

             MCV (test code = 787-2) 94.9 fL                          

 

             MCH (test code = 785-6) 29.7 pg      27-34                     

 

             MCHC (test code = 786-4) 31.3 g/dL    31-37                     

 

             RDW - SD (test code = 75.0 fL      37-55        H            



             74492-7)                                            

 

             MPV (test code = 70961-3) 12.2 fL      8.8-13.2                  

 

             Platelet count (test code 79           150- 400 k/uL L            



             = 87627-5)                                          

 

             Nucleated RBC (test code 0.00         /100 WBC                  



             = 99548-5)                                          

 

             Neutrophils (test code = 48.1 %       39-69                     



             09159-7)                                            

 

             Lymphocytes (test code = 27.7 %       25-45                     



             70638-1)                                            

 

             Monocytes (test code = 23.3 %       0-10         H            



             26485-3)                                            

 

             Eosinophils (test code = 0.3 %        0-5                       



             07631-1)                                            

 

             Basophils (test code = 0.3 %        0-1                       



             29679-5)                                            

 

             Immature granulocytes 0.3 %        0-1                       "Immat

ure



             (test code = 95312-1)                                        granul

ocytes"



                                                                 (promyelocytes,



                                                                 myelocytes,



                                                                 metamyelocytes)

 

             Lab Interpretation (test Abnormal                               



             code = 11327-9)                                        



[facility] MethodistBasic metabolic [id number] 05:33:58





             Test Item    Value        Reference Range Interpretation Comments

 

             Sodium (test code = 2951-2) 138          135- 148 mEq/L            

  

 

             Potassium (test code = 2823-3) 3.7          3.5- 5.0 mEq/L         

     

 

             Chloride (test code = 2075-0) 105          98- 112 mEq/L           

   

 

             CO2 (test code = 8-9) 21           24- 31 mEq/L L            

 

             Anion gap (test code = 33037-3) 12@ANIO      7- 15 mEq/L           

    

 

             BUN (test code = 3094-0) 8 mg/dL      8-23                      

 

             Creatinine (test code = 2160-0) 0.65 mg/dL   0.7-1.2      L        

    

 

             Glucose (test code = 2345-7) 114 mg/dL    65-99        H           

 

 

             Calcium (test code = 21774-5) 8.4 mg/dL    8.8-10.2     L          

  

 

             Lab Interpretation (test code = Abnormal                           

    



             19620-1)                                            



[facility] MethodistFL &lt; 1 [id number] 19:00:00Hm Interface, Radiology 
Results  - 2020  7:03 PM CDTEXAMINATION:  FL &lt; 1 HOURLOCATION: 
[facility] ENDOSCOPY ROOMPROCEDURE: EGD WITH STENT REMOVALSCHEDULED TIME:1100START 
TIME:1045END TIME: 1120FLUORO TIME: 0.3  MINS.DOSE (mGy):  3.0 MGy# OF IMAGES: 
6TECH(S): METIMPRESSION:Fluoroscopy wasrequested in the Endoscopy Suite.  
Separate endoscopy report will be issued by the physician performing the 
procedure.[facility] OoagbpnlzBetzyo0057-56-69 11:00:40Bradford Mott MD     2020 11:01 AMAirwayDate/Time: 2020 10:56 AMPerformed by: Bradford Mott MDAuthorized by: Bradford Mott MD  Location:  
EndoscopyUrgency:  ElectiveDifficult Airway: No  Preoxygenated with 100% O2: Yes
 C-spine Precautions Maintained Throughout: Yes  Mask Ventilation:  Not 
attemptedFinal Airway Type:  Endotracheal airwayFinal Endotracheal Airway:  
ETTCuffed: Yes  Technique Used:  Direct laryngoscopyInsertion Site:  OralBlade 
Type:  MillerLaryngoscope Blade/Videolaryngoscope Blade Size:  2ETT Size (mm):  
8.0Cuff at minimum occlusion pressure: Yes  Measured from:  LipsETT to Lips 
(cm):  26Placement Verified by: CO2 detection, direct visualization and equal 
breath sounds  Laryngoscopic view:  Grade I - full view of glottisModified RSI: 
Yes  Number of Attempts at Approach:  1[facility] MethodistPhosphorus level
2020 06:09:13





             Test Item    Value        Reference Range Interpretation Comments

 

             Phosphorus (test code = 2777-1) 2.8 mg/dL    2.4-4.5               

    



Mark MethodistThyroid stimulating qhspyzo5908-11-06 06:08:06





             Test Item    Value        Reference Range Interpretation Comments

 

             TSH (test code = 3016-3) 0.98         0.27- 4.20 uIU/mL            

  



[facility] MethodistT4, [id number] 06:08:04





             Test Item    Value        Reference Range Interpretation Comments

 

             T4, free (test code = 3024-7) 1.6 ng/dL    0.9-1.7                 

  



Mark CrawfordCOVID-19 qualitative [id number] 17:48:37





             Test Item    Value        Reference Range Interpretation Comments

 

             Interpretation (test Negative results do                           



             code = 1685298) not preclude                           



                          2019-nCoV infection                           



                          and should not be                           



                          used as the sole                           



                          basis for treatment                           



                          or other patient                           



                          management decisions.                           



                          Negative results must                           



                          be combined with                           



                          clinical                               



                          observations, patient                           



                          history, and                           



                          epidemiological                           



                          information.                           

 

             COVID-19 qualitative Not-Detected Not-Detected              



             PCR result (test                                        



             code = 74334-3)                                        

 

             COVID-19 qualitative See link below for                           C

ase Number:



             PCR (test code = PDF Lab Report                           [id number]

074



             7070)                                               



[facility] MethodistXR Abdomen 1 [id number] 23:03:18Hm Interface, Radiology 
Results 2020 11:06 PM CDTExamination:  XR ABDOMEN 1 VWClinical 
History: dysphagia    dislodged esopahgeal stentComparison: None.Findings:Single
frontal view of the abdomen is obtained. Bowel gas pattern is nonspecific but 
nonobstructive with scattered fecal material in the colon.There is a distal 
esophageal stent noted in the region of the distal esophagus, GEjunction with 
the distal aspect overlying the cardia or proximal body of the stomach. Clinical
correlation is recommended.No free air is seen.IMPRESSION:1. Nonspecific but 
nonobstructive bowel gas pattern.2. Esophageal stent as described. Clinical 
correlation is recommended.[facility]-[id number] MethodistPartial thromboplastin
time, [id number] 22:34:28





             Test Item    Value        Reference Range Interpretation Comments

 

             PTT (test code = 28.8         23.0- 36.0 sec              PTT thera

peutic range for



             28945-2)                                            unfractionated 

heparin



                                                                 is61.0-112.0 se

conds which



                                                                 corresponds to 

Anti-Xa0.3-0.7



                                                                 U/ml.



[facility] MethodistProthrombin time with [id number] 22:33:54





             Test Item    Value        Reference Range Interpretation Comments

 

             Prothrombin time (test 14.5         11.5- 14.5 sec              



             code = 5902-2)                                        

 

             INR (test code = 1.1                                    [facility]

[facility]



             40857-3)                                            Normalized Rati

o (INR) is



                                                                 a therapeutic m

onitoring



                                                                 tool for patien

ts who are



                                                                 stable on oral



                                                                 anticoagulant t

herapy. An



                                                                 INR of 2.0-3.0 

is



                                                                 suggested for d

eep vein



                                                                 thrombosis/pulm

onary



                                                                 embolism.



[facility] Abdomen [facility]rast Pelvis W Contrast
2020 11:22:03Hm Interface, Radiology Results 2020 11:25 
AM CDTEXAMINATION:  CT CHEST W CONTRAST ABDOMEN W CONTRAST PELVIS W 
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 
adenocarcinomaTECHNIQUE:  Multiple axial images of the chest, abdomen, and 
pelvis were obtained following intravenous administration of iodinated contrast.
Sagittal and coronal computerized reformatted images were obtained. CT imaging 
was performed with iterative reconstruction techniques and/or automated exposure
control to reduce radiation dose. COMPARISON:  To a previous outside examination
from 3/5/2020IMPRESSION:CHEST:Lungs and airways: Patchy peripheral areas of 
groundglass attenuation are present in the posterior segment of the right upper 
lobe. Findings are nonspecific but are concerning for aspiration pneumonia.The 
left lung is clear. Pleura: No pleural effusion or pneumothorax.Mediastinum and 
lymph nodes: No lymphadenopathy. Cardiovascular: The heart size is normal. No 
pericardial effusion. The thoracic aorta is normal in caliber. No dissection. 
Other: A metallic stent is present in the distal esophagus extending into the 
stomach. The stomach is distended.ABDOMEN:Liver: Extensive metastatic disease is
present, most marked involving hepatic segments 7 and 8, without definite 
interval change from the March examination. Metastatic nodules measure 5 to 20 
mm.Gallbladder/Biliary: The gallbladder is normal. There is no evidence of intra
or extrahepatic biliary ductal dilatation.Spleen: The spleen is not enlarg
ed.Pancreas: The pancreas is small.Adrenal Glands: The adrenal glands are 
unremarkable.Kidneys: The kidneys are unremarkable. No mass, hydronephrosis or 
calculi. Subcentimeter cysts are present in bothkidneys not of clinical 
significance.Vascular: The abdominal aorta is nonaneurysmal.Nodes: No enlarged 
retroperitoneal or mesenteric lymphadenopathy.Bowel: No bowel obstruction or 
inflammatory changes.The appendix is normal in appearance. Moderate amount of 
feces is noted in the colon.Ascites/fluid collections: No ascites or fluid 
collections.PELVIS:No mass, fluid collection or significant adenopathy. 
MUSCULOSKELETAL: No suspicious osseous lesions. SUMMARY:1.Metastatic disease in 
the liver.2.Placement of a metallic stent across the GE junction.[id number]
[facility]an[facility] Complete, (w Contrast, Strain and 
3D if needed)2020 11:28:00Interface, Radiology Results In 2020 
11:28 AM CDT               Echocardiography Report           [address] [address], [place] [address]                               [facility].Name:  KAYLI REGALADO.ID:    [id number].Date:   2020               Refer.MD: 
MAGGIE MCGREGOR MD       Exam Time: 9:07:00 AM    Study Type:Routine Echo        
   Height:    73in                    Weight:    159lb        BSA:       1.95 m2
                  Age:  1950,69Y           Sex:       MALE            
BP:        115/67                  HR:        69 bpm                  Sonogrphr:
EDI Rios          Pat. Stat.:Outpatient              Room:      \[facility] [place] [facility]\""     
             Study Status:Final                 Echo Event ID:[id number]         
  Order ID:  [id number]              Reason forStudy:chemotherapy      
Procedures: 2D Echo, Colorflow Doppler, StrainRace:      C    
------------------------------------SUMMARY:------------------------------------

LV EF is normal.Estimated EF is 55-59%.  RV systolic function is 
normal.Estimated PA systolic pressure is 26-31 mmHg,assuming a mean RAP of5-10 
mmHg.------------------------------------FINDINGS:------------------------
------------LV:       LV EF is normal. Overall wall motion is normal. Estimated 
EF          is 55-59%. Normal average LV global longitudinal strain at          
-18.4%.RV:       RV size is normal. RV systolic function is normal.LA:       LA 
volume is severely enlarged.RA:       RA size is normal.AO:   Aortic root 
diameter is normal.MARK:     No pericardial effusion.AV:       No structural AV 
abnormalities noted.MV:       No structural MV abnormalities noted. A trace of 
mitral          regurgitation. PV:       No structural PV abnormalities 
noted.TV:       No structural TV abnormalities noted. Mild tricuspid          
regurgitation [place]:     Normal diastolic function and LV filling pressures.Other
:    Estimated PA systolic pressure is 26-31 mmHg, assuming a          mean RAP 
of 5-10 mmHg.--------
----------------------------MEASUREMENTS:------------------------------------   
      2DParasternal Long Axis Ao An            2.4 cm            LV%fs          
  30 %    Ao Rtd           3.4 cm            Index        1.7 cm/m2             
LVPWd           0.96 cm   IVSd      1.1 cm            RWT              0.4      
LVIDd            4.9 cm            Index        2.5 cm/m2             LV Mass   
      186 g    (122-174) LVIDs            3.4 cm            LVM Index       96 
g/m2LA Sng Plane LA Area           27 cm2  (8.8-23.4) LA Vol            93 ml   
Index  48 ml/m2 LA LngAx           6 cm           LVOT Stroke Vol  LVOT         
   2.3 cm           LVOT  LVOT Area        4.2 cm2                              
           DOPPLERLVOT Stroke Vol  LVOT TVI         18 cm            LVOT CI    
     2.4 l/m/m2 LVOT SV           76 ml            HR      61 bpm  LVOT CO      
   4.6 l/min        LVOT   SVi               39 ml/m2          Signed 2020
11:28 Mando Yoon M.D.[facility] MethodistVitamin B12 [id number] 11:04:15





             Test Item    Value        Reference Range Interpretation Comments

 

             Vitamin B12 (test 532 pg/mL    211-946                   Significan

t overlap



             code = 2132-9)                                        exists betwee

n normal



                                                                 and deficiency



                                                                 states.However,

 most



                                                                 patients with



                                                                 deficiencies wi

ll have



                                                                 Serum B12    <2

00 pg/mL.



                                                                 



                                                                 



                                                                 



[facility] MethodistFolate [id number] 11:04:15





             Test Item    Value        Reference Range Interpretation Comments

 

             Folate (test code = 2284-8) 10.8 ng/mL   4.8-24.2                  



[facility] MethodistFerritin [id number] 10:51:58





             Test Item    Value        Reference Range Interpretation Comments

 

             Ferritin level (test code = 2276-4) 228 ng/mL                

        



[facility] MethodistTotal iron binding [id number] 10:51:47





             Test Item    Value        Reference Range Interpretation Comments

 

             Iron level (test code = 2498-4) 15 ug/dL            L        

    

 

             Iron binding capacity (test code = 178 ug/dL    200-400      L     

       



             2500-7)                                             

 

             % Saturation (test code = 2502-3) 8.4 %        20-40        L      

      

 

             Lab Interpretation (test code = Abnormal                           

    



             84829-9)                                            



[facility] CrawfordBlood Culture - Routine (Left Venipuncture)2020 07:00:00





             Test Item    Value        Reference Range Interpretation Comments

 

             Result (test code = No growth in 5 days                           



             6463-4)                                             



[id number]





             Test Item    Value        Reference Range Interpretation Comments

 

             CULTURE (BEAKER) (test No growth in 5 days                         

  



             code = 1095)                                        



BLOOD [id number] 04:00:00





             Test Item    Value        Reference Range Interpretation Comments

 

             CULTURE (BEAKER) (test No growth in 5 days                         

  



             code = 1095)                                        



CBC with platelet count + automated [id number] 05:09:00





             Test Item    Value        Reference Range Interpretation Comments

 

             WBC (test code = 6690-2) 4.8          3.5- 10.5 K/L              

 

             RBC (test code = 789-8) 2.58         4.63- 6.08 M/L L            

 

             MCHC (test code = 786-4) 32.8         32.3- 36.5 GM/DL L           

 

 

             Hematocrit (test code = 4544-3) 22.9 %       40.1-51      L        

    

 

             MCV (test code = 787-2) 88.8 fL      79-92.2                   

 

             MCH (test code = 785-6) 29.1 pg      25.7-32.2                 

 

             RDW (test code = 788-0) 12.9 %       11.6-14.4                 

 

             Platelets (test code = 777-3) 160          150- 450 K/CU MM        

      

 

             MPV (test code = 51616-6) 11.0 fL      9.4-12.4                  

 

             nRBC (test code = 413) 0            0- 0 /100 WBC              

 

             % Neutros (test code = 429) 42 %                                   

 

             % Lymphs (test code = 430) 29 %                                   

 

             % Monos (test code = 431) 19 %                                   

 

             % Eos (test code = 432) 10 %                                   

 

             % Baso (test code = 437) 0 %                                    

 

             # Neutros (test code = 670) 2.00         1.78- 5.38 K/L          

    

 

             # Lymphs (test code = 414) 1.36         1.32- 3.57 K/L           

   

 

             # Monos (test code = 415) 0.88         0.30- 0.82 K/L H          

  

 

             # Eos (test code = 416) 0.48         0.04- 0.54 K/L              

 

             # Baso (test code = 417) 0.02         0.01- 0.08 K/L             

 

 

             Immature Granulocytes-Relative 0 %          0-1                    

   



             (test code = 2801)                                        

 

             Lab Interpretation (test code = Abnormal                           

    



             89329-7)                                            



[facility] W/PLT COUNT &amp; AUTO [id number] 
05:09:00





             Test Item    Value        Reference Range Interpretation Comments

 

             WHITE BLOOD CELL COUNT (BEAKER) 4.8 K/ L     3.5-10.5              

    



             (test code = 775)                                        

 

             RED BLOOD CELL COUNT (BEAKER) 2.58 M/ L    4.63-6.08    L          

  



             (test code = 761)                                        

 

             HEMOGLOBIN (BEAKER) (test code = 7.5 GM/DL    13.7-17.5    L       

     



             410)                                                

 

             HEMATOCRIT (BEAKER) (test code = 22.9 %       40.1-51.0    L       

     



             411)                                                

 

             MEAN CORPUSCULAR VOLUME (BEAKER) 88.8 fL      79.0-92.2            

     



             (test code = 753)                                        

 

             MEAN CORPUSCULAR HEMOGLOBIN 29.1 pg      25.7-32.2                 



             (BEAKER) (test code = 751)                                        

 

             MEAN CORPUSCULAR HEMOGLOBIN CONC 32.8 GM/DL   32.3-36.5            

     



             (BEAKER) (test code = 752)                                        

 

             RED CELL DISTRIBUTION WIDTH 12.9 %       11.6-14.4                 



             (BEAKER) (test code = 412)                                        

 

             PLATELET COUNT (BEAKER) (test 160 K/CU MM  150-450                 

  



             code = 756)                                         

 

             MEAN PLATELET VOLUME (BEAKER) 11.0 fL      9.4-12.4                

  



             (test code = 754)                                        

 

             NUCLEATED RED BLOOD CELLS 0 /100 WBC   0-0                       



             (BEAKER) (test code = 413)                                        

 

             NEUTROPHILS RELATIVE PERCENT 42 %                                  

 



             (BEAKER) (test code = 429)                                        

 

             LYMPHOCYTES RELATIVE PERCENT 29 %                                  

 



             (BEAKER) (test code = 430)                                        

 

             MONOCYTES RELATIVE PERCENT 19 %                                   



             (BEAKER) (test code = 431)                                        

 

             EOSINOPHILS RELATIVE PERCENT 10 %                                  

 



             (BEAKER) (test code = 432)                                        

 

             BASOPHILS RELATIVE PERCENT 0 %                                    



             (BEAKER) (test code = 437)                                        

 

             NEUTROPHILS ABSOLUTE COUNT 2.00 K/ L    1.78-5.38                 



             (BEAKER) (test code = 670)                                        

 

             LYMPHOCYTES ABSOLUTE COUNT 1.36 K/ L    1.32-3.57                 



             (BEAKER) (test code = 414)                                        

 

             MONOCYTES ABSOLUTE COUNT (BEAKER) 0.88 K/ L    0.30-0.82    H      

      



             (test code = 415)                                        

 

             EOSINOPHILS ABSOLUTE COUNT 0.48 K/ L    0.04-0.54                 



             (BEAKER) (test code = 416)                                        

 

             BASOPHILS ABSOLUTE COUNT (BEAKER) 0.02 K/ L    0.01-0.08           

      



             (test code = 417)                                        

 

             IMMATURE GRANULOCYTES-RELATIVE 0 %          0-1                    

   



             PERCENT (BEAKER) (test code =                                      

  



             2801)                                               



CBC (Hemogram only)2020 12:54:00





             Test Item    Value        Reference Range Interpretation Comments

 

             WBC (test code = 6690-2) 5.1          3.5- 10.5 K/L              

 

             RBC (test code = 789-8) 2.58         4.63- 6.08 M/L L            

 

             MCHC (test code = 786-4) 32.8         32.3- 36.5 GM/DL L           

 

 

             Hematocrit (test code = 4544-3) 23.5 %       40.1-51      L        

    

 

             MCV (test code = 787-2) 91.1 fL      79-92.2                   

 

             MCH (test code = 785-6) 29.8 pg      25.7-32.2                 

 

             RDW (test code = 788-0) 13.0 %       11.6-14.4                 

 

             Platelets (test code = 777-3) 137          150- 450 K/CU MM L      

      

 

             MPV (test code = 54802-6) 10.8 fL      9.4-12.4                  

 

             nRBC (test code = 413) 0            0- 0 /100 WBC              

 

             Lab Interpretation (test code = Abnormal                           

    



             04802-6)                                            



[facility] 12[facility]





             Test Item    Value        Reference Range Interpretation Comments

 

             WHITE BLOOD CELL COUNT (BEAKER) 5.1 K/ L     3.5-10.5              

    



             (test code = 775)                                        

 

             RED BLOOD CELL COUNT (BEAKER) 2.58 M/ L    4.63-6.08    L          

  



             (test code = 761)                                        

 

             HEMOGLOBIN (BEAKER) (test code = 7.7 GM/DL    13.7-17.5    L       

     



             410)                                                

 

             HEMATOCRIT (BEAKER) (test code = 23.5 %       40.1-51.0    L       

     



             411)                                                

 

             MEAN CORPUSCULAR VOLUME (BEAKER) 91.1 fL      79.0-92.2            

     



             (test code = 753)                                        

 

             MEAN CORPUSCULAR HEMOGLOBIN 29.8 pg      25.7-32.2                 



             (BEAKER) (test code = 751)                                        

 

             MEAN CORPUSCULAR HEMOGLOBIN CONC 32.8 GM/DL   32.3-36.5            

     



             (BEAKER) (test code = 752)                                        

 

             RED CELL DISTRIBUTION WIDTH 13.0 %       11.6-14.4                 



             (BEAKER) (test code = 412)                                        

 

             PLATELET COUNT (BEAKER) (test 137 K/CU MM  150-450      L          

  



             code = 756)                                         

 

             MEAN PLATELET VOLUME (BEAKER) 10.8 fL      9.4-12.4                

  



             (test code = 754)                                        

 

             NUCLEATED RED BLOOD CELLS 0 /100 WBC   0-0                       



             (BEAKER) (test code = 413)                                        



Basic Metabolic [id number] 06:50:00





             Test Item    Value        Reference Range Interpretation Comments

 

             Sodium (test code = 134 meq/L    136-145      L            



             2951-2)                                             

 

             Potassium (test code = 3.5 meq/L    3.5-5.1                   



             2823-3)                                             

 

             Chloride (test code = 105 meq/L                        



             2075-0)                                             

 

             CO2 (test code = 26 meq/L     -8-9)                                             

 

             BUN (test code = 7 mg/dL      7-21                      



             3094-0)                                             

 

             Creatinine (test code 0.68 mg/dL   0.57-1.25                 



             = 2160-0)                                           

 

             Glucose (test code = 108 mg/dL           H            



             2345-7)                                             

 

             Calcium (test code = 7.5 mg/dL    8.4-10.2     L            



             53352-0)                                            

 

             EGFR (test code = 115          mL/min/1.73 sq m              ESTIMA

AGUILAR GFR IS



             33914-3)                                            NOT AS ACCURATE



                                                                 AS CREATININE



                                                                 CLEARANCE IN



                                                                 PREDICTING



                                                                 GLOMERULAR



                                                                 FILTRATION RATE

.



                                                                 ESTIMATED GFR I

S



                                                                 NOT APPLICABLE



                                                                 FOR DIALYSIS



                                                                 PATIENTS.

 

             GANGA (test code = GANGA)  ID -                           



                          PIAYA L                                

 

             Lab Interpretation Abnormal                               



             (test code = 97048-4)                                        



[facility] METABOLIC [id number] 06:50:00





             Test Item    Value        Reference Range Interpretation Comments

 

             SODIUM (BEAKER) 134 meq/L    136-145      L            



             (test code = 381)                                        

 

             POTASSIUM (BEAKER) 3.5 meq/L    3.5-5.1                   



             (test code = 379)                                        

 

             CHLORIDE (BEAKER) 105 meq/L                        



             (test code = 382)                                        

 

             CO2 (BEAKER) (test 26 meq/L     -                     



             code = 355)                                         

 

             BLOOD UREA NITROGEN 7 mg/dL      7-21                      



             (BEAKER) (test code                                        



             = 354)                                              

 

             CREATININE (BEAKER) 0.68 mg/dL   0.57-1.25                 



             (test code = 358)                                        

 

             GLUCOSE RANDOM 108 mg/dL           H            



             (BEAKER) (test code                                        



             = 652)                                              

 

             CALCIUM (BEAKER) 7.5 mg/dL    8.4-10.2     L            



             (test code = 697)                                        

 

             EGFR (BEAKER) (test 115 mL/min/1.73                           ESTIM

ATED GFR IS



             code = 1092) sq m                                   NOT AS ACCURATE

 AS



                                                                 CREATININE



                                                                 CLEARANCE IN



                                                                 PREDICTING



                                                                 GLOMERULAR



                                                                 FILTRATION RATE

.



                                                                 ESTIMATED GFR I

S



                                                                 NOT APPLICABLE 

FOR



                                                                 DIALYSIS PATIEN

TS.



 ID - PIAYA LCBC (HEMOGRAM ONLY)2020 06:22:00





             Test Item    Value        Reference Range Interpretation Comments

 

             WHITE BLOOD CELL COUNT (BEAKER) 5.1 K/ L     3.5-10.5              

    



             (test code = 775)                                        

 

             RED BLOOD CELL COUNT (BEAKER) 2.52 M/ L    4.63-6.08    L          

  



             (test code = 761)                                        

 

             HEMOGLOBIN (BEAKER) (test code = 7.2 GM/DL    13.7-17.5    L       

     



             410)                                                

 

             HEMATOCRIT (BEAKER) (test code = 22.2 %       40.1-51.0    L       

     



             411)                                                

 

             MEAN CORPUSCULAR VOLUME (BEAKER) 88.1 fL      79.0-92.2            

     



             (test code = 753)                                        

 

             MEAN CORPUSCULAR HEMOGLOBIN 28.6 pg      25.7-32.2                 



             (BEAKER) (test code = 751)                                        

 

             MEAN CORPUSCULAR HEMOGLOBIN CONC 32.4 GM/DL   32.3-36.5            

     



             (BEAKER) (test code = 752)                                        

 

             RED CELL DISTRIBUTION WIDTH 12.9 %       11.6-14.4                 



             (BEAKER) (test code = 412)                                        

 

             PLATELET COUNT (BEAKER) (test 132 K/CU MM  150-450      L          

  



             code = 756)                                         

 

             MEAN PLATELET VOLUME (BEAKER) 11.0 fL      9.4-12.4                

  



             (test code = 754)                                        

 

             NUCLEATED RED BLOOD CELLS 0 /100 WBC   0-0                       



             (BEAKER) (test code = 413)                                        



CBC (HEMOGRAM ONLY)2020 00:15:00





             Test Item    Value        Reference Range Interpretation Comments

 

             WHITE BLOOD CELL COUNT (BEAKER) 5.4 K/ L     3.5-10.5              

    



             (test code = 775)                                        

 

             RED BLOOD CELL COUNT (BEAKER) 2.61 M/ L    4.63-6.08    L          

  



             (test code = 761)                                        

 

             HEMOGLOBIN (BEAKER) (test code = 7.5 GM/DL    13.7-17.5    L       

     



             410)                                                

 

             HEMATOCRIT (BEAKER) (test code = 22.9 %       40.1-51.0    L       

     



             411)                                                

 

             MEAN CORPUSCULAR VOLUME (BEAKER) 87.7 fL      79.0-92.2            

     



             (test code = 753)                                        

 

             MEAN CORPUSCULAR HEMOGLOBIN 28.7 pg      25.7-32.2                 



             (BEAKER) (test code = 751)                                        

 

             MEAN CORPUSCULAR HEMOGLOBIN CONC 32.8 GM/DL   32.3-36.5            

     



             (BEAKER) (test code = 752)                                        

 

             RED CELL DISTRIBUTION WIDTH 12.9 %       11.6-14.4                 



             (BEAKER) (test code = 412)                                        

 

             PLATELET COUNT (BEAKER) (test 125 K/CU MM  150-450      L          

  



             code = 756)                                         

 

             MEAN PLATELET VOLUME (BEAKER) 10.8 fL      9.4-12.4                

  



             (test code = 754)                                        

 

             NUCLEATED RED BLOOD CELLS 0 /100 WBC   0-0                       



             (BEAKER) (test code = 413)                                        



Prepare Leuko-Red RBC2020-04-15 23:54:00





             Test Item    Value        Reference Range Interpretation Comments

 

             CROSSMATCH (test code = COMPATIBLE                             



             2264)                                               

 

             Unit ABO (test code = A Neg                                  



             4306640)                                            

 

             UNIT NUMBER (test code = [id number]                           



             934-0)                                              

 

             Status (test code = 0642066) WORK IN PROGRESS                      

     

 

             Blood Bank Product (test RED BLOOD CELLS                           



             code = 2263)                                        

 

             PRODUCT CODE (test code = C5276P62                               



             933-2)                                              



[facility] (HEMOGRAM ONLY)2020-04-15 18:11:00





             Test Item    Value        Reference Range Interpretation Comments

 

             WHITE BLOOD CELL COUNT (BEAKER) 6.1 K/ L     3.5-10.5              

    



             (test code = 775)                                        

 

             RED BLOOD CELL COUNT (BEAKER) 2.54 M/ L    4.63-6.08    L          

  



             (test code = 761)                                        

 

             HEMOGLOBIN (BEAKER) (test code = 7.4 GM/DL    13.7-17.5    L       

     



             410)                                                

 

             HEMATOCRIT (BEAKER) (test code = 22.9 %       40.1-51.0    L       

     



             411)                                                

 

             MEAN CORPUSCULAR VOLUME (BEAKER) 90.2 fL      79.0-92.2            

     



             (test code = 753)                                        

 

             MEAN CORPUSCULAR HEMOGLOBIN 29.1 pg      25.7-32.2                 



             (BEAKER) (test code = 751)                                        

 

             MEAN CORPUSCULAR HEMOGLOBIN CONC 32.3 GM/DL   32.3-36.5            

     



             (BEAKER) (test code = 752)                                        

 

             RED CELL DISTRIBUTION WIDTH 13.0 %       11.6-14.4                 



             (BEAKER) (test code = 412)                                        

 

             PLATELET COUNT (BEAKER) (test 124 K/CU MM  150-450      L          

  



             code = 756)                                         

 

             MEAN PLATELET VOLUME (BEAKER) 11.1 fL      9.4-12.4                

  



             (test code = 754)                                        

 

             NUCLEATED RED BLOOD CELLS 0 /100 WBC   0-0                       



             (BEAKER) (test code = 413)                                        



CBC (HEMOGRAM ONLY)2020-04-15 13:07:00





             Test Item    Value        Reference Range Interpretation Comments

 

             WHITE BLOOD CELL COUNT (BEAKER) 5.8 K/ L     3.5-10.5              

    



             (test code = 775)                                        

 

             RED BLOOD CELL COUNT (BEAKER) 2.49 M/ L    4.63-6.08    L          

  



             (test code = 761)                                        

 

             HEMOGLOBIN (BEAKER) (test code = 7.2 GM/DL    13.7-17.5    L       

     



             410)                                                

 

             HEMATOCRIT (BEAKER) (test code = 22.0 %       40.1-51.0    L       

     



             411)                                                

 

             MEAN CORPUSCULAR VOLUME (BEAKER) 88.4 fL      79.0-92.2            

     



             (test code = 753)                                        

 

             MEAN CORPUSCULAR HEMOGLOBIN 28.9 pg      25.7-32.2                 



             (BEAKER) (test code = 751)                                        

 

             MEAN CORPUSCULAR HEMOGLOBIN CONC 32.7 GM/DL   32.3-36.5            

     



             (BEAKER) (test code = 752)                                        

 

             RED CELL DISTRIBUTION WIDTH 12.9 %       11.6-14.4                 



             (BEAKER) (test code = 412)                                        

 

             PLATELET COUNT (BEAKER) (test 107 K/CU MM  150-450      L          

  



             code = 756)                                         

 

             MEAN PLATELET VOLUME (BEAKER) 11.2 fL      9.4-12.4                

  



             (test code = 754)                                        

 

             NUCLEATED RED BLOOD CELLS 0 /100 WBC   0-0                       



             (BEAKER) (test code = 413)                                        



POC-Glucose meter2020-04-15 05:44:00





             Test Item    Value        Reference Range Interpretation Comments

 

             POC-Glucose Meter (test 112 mg/dL           H            : TE

STED AT [facility]



             code = 1538)                                        [address]



                                                                 [place], [address]

30:



                                                                 /Techni

melissa



                                                                 ID = [id number] for



                                                                 ELISHA FRITZ

 

             Lab Interpretation (test Abnormal                               



             code = 28743-1)                                        



[facility]





             Test Item    Value        Reference Range Interpretation Comments

 

             POC-GLUCOSE METER 112 mg/dL           H            : TESTED A

T [facility] [address]



             (BEAKER) (test code =                                        [address]

R [facility],



             1538)                                               [address]:



                                                                 /Techni

melissa ID



                                                                 = [id number] for ELISHA BUCKLEY



BASIC METABOLIC PANEL2020-04-15 05:05:00





             Test Item    Value        Reference Range Interpretation Comments

 

             SODIUM (BEAKER) 134 meq/L    136-145      L            



             (test code = 381)                                        

 

             POTASSIUM (BEAKER) 3.9 meq/L    3.5-5.1                   



             (test code = 379)                                        

 

             CHLORIDE (BEAKER) 107 meq/L                        



             (test code = 382)                                        

 

             CO2 (BEAKER) (test 24 meq/L     22-29                     



             code = 355)                                         

 

             BLOOD UREA NITROGEN 13 mg/dL     7-21                      



             (BEAKER) (test code                                        



             = 354)                                              

 

             CREATININE (BEAKER) 0.71 mg/dL   0.57-1.25                 



             (test code = 358)                                        

 

             GLUCOSE RANDOM 123 mg/dL           H            



             (BEAKER) (test code                                        



             = 652)                                              

 

             CALCIUM (BEAKER) 7.1 mg/dL    8.4-10.2     L            



             (test code = 697)                                        

 

             EGFR (BEAKER) (test 109 mL/min/1.73                           ESTIM

ATED GFR IS



             code = 1092) sq m                                   NOT AS ACCURATE

 AS



                                                                 CREATININE



                                                                 CLEARANCE IN



                                                                 PREDICTING



                                                                 GLOMERULAR



                                                                 FILTRATION RATE

.



                                                                 ESTIMATED GFR I

S



                                                                 NOT APPLICABLE 

FOR



                                                                 DIALYSIS PATIEN

TS.



 ID - PIAYA LCBC (HEMOGRAM ONLY)2020-04-15 04:35:00





             Test Item    Value        Reference Range Interpretation Comments

 

             WHITE BLOOD CELL COUNT (BEAKER) 6.6 K/ L     3.5-10.5              

    



             (test code = 775)                                        

 

             RED BLOOD CELL COUNT (BEAKER) 2.58 M/ L    4.63-6.08    L          

  



             (test code = 761)                                        

 

             HEMOGLOBIN (BEAKER) (test code = 7.4 GM/DL    13.7-17.5    L       

     



             410)                                                

 

             HEMATOCRIT (BEAKER) (test code = 22.7 %       40.1-51.0    L       

     



             411)                                                

 

             MEAN CORPUSCULAR VOLUME (BEAKER) 88.0 fL      79.0-92.2            

     



             (test code = 753)                                        

 

             MEAN CORPUSCULAR HEMOGLOBIN 28.7 pg      25.7-32.2                 



             (BEAKER) (test code = 751)                                        

 

             MEAN CORPUSCULAR HEMOGLOBIN CONC 32.6 GM/DL   32.3-36.5            

     



             (BEAKER) (test code = 752)                                        

 

             RED CELL DISTRIBUTION WIDTH 13.1 %       11.6-14.4                 



             (BEAKER) (test code = 412)                                        

 

             PLATELET COUNT (BEAKER) (test 106 K/CU MM  150-450      L          

  



             code = 756)                                         

 

             MEAN PLATELET VOLUME (BEAKER) 11.3 fL      9.4-12.4                

  



             (test code = 754)                                        

 

             NUCLEATED RED BLOOD CELLS 0 /100 WBC   0-0                       



             (BEAKER) (test code = 413)                                        



CBC (HEMOGRAM ONLY)2020 23:56:00





             Test Item    Value        Reference Range Interpretation Comments

 

             WHITE BLOOD CELL COUNT 6.5 K/ L     3.5-10.5                  



             (BEAKER) (test code =                                        



             775)                                                

 

             RED BLOOD CELL COUNT 2.58 M/ L    4.63-6.08    L            



             (BEAKER) (test code =                                        



             761)                                                

 

             HEMOGLOBIN (BEAKER) 7.7 GM/DL    13.7-17.5    L            



             (test code = 410)                                        

 

             HEMATOCRIT (BEAKER) 22.8 %       40.1-51.0    L            



             (test code = 411)                                        

 

             MEAN CORPUSCULAR 88.4 fL      79.0-92.2                 



             VOLUME (BEAKER) (test                                        



             code = 753)                                         

 

             MEAN CORPUSCULAR 29.8 pg      25.7-32.2                 



             HEMOGLOBIN (BEAKER)                                        



             (test code = 751)                                        

 

             MEAN CORPUSCULAR 33.8 GM/DL   32.3-36.5                 



             HEMOGLOBIN CONC                                        



             (BEAKER) (test code =                                        



             752)                                                

 

             RED CELL DISTRIBUTION 13.1 %       11.6-14.4                 



             WIDTH (BEAKER) (test                                        



             code = 412)                                         

 

             PLATELET COUNT 105 K/CU MM  150-450      L            Discordant PL

T



             (BEAKER) (test code =                                        result

s compared to



             756)                                                previous result

s;



                                                                 clinical correl

ation



                                                                 required.

 

             MEAN PLATELET VOLUME 11.4 fL      9.4-12.4                  



             (BEAKER) (test code =                                        



             754)                                                

 

             NUCLEATED RED BLOOD 0 /100 WBC   0-0                       



             CELLS (BEAKER) (test                                        



             code = 413)                                         



POCT-GLUCOSE [id number] 23:45:00





             Test Item    Value        Reference Range Interpretation Comments

 

             POC-GLUCOSE METER 107 mg/dL                        : TESTED A

T [facility] [address]



             (BEAKER) (test code =                                        [facility] [place],



             1538)                                               [address]:



                                                                 /Techni

melissa ID



                                                                 = [id number] for ROCK ORANTES



POCT-GLUCOSE [id number] 18:25:00





             Test Item    Value        Reference Range Interpretation Comments

 

             POC-GLUCOSE METER 118 mg/dL           H            : TESTED A

T [facility] [address]



             (BEAKER) (test code =                                        [facility],



             1538)                                               [address]:



                                                                 /Techni

melissa ID



                                                                 = [id number] for ZEKE MAZARIEGOS



CBC (HEMOGRAM ONLY)2020 15:52:00





             Test Item    Value        Reference Range Interpretation Comments

 

             WHITE BLOOD CELL COUNT (BEAKER) 6.2 K/ L     3.5-10.5              

    



             (test code = 775)                                        

 

             RED BLOOD CELL COUNT (BEAKER) 2.72 M/ L    4.63-6.08    L          

  



             (test code = 761)                                        

 

             HEMOGLOBIN (BEAKER) (test code = 7.9 GM/DL    13.7-17.5    L       

     



             410)                                                

 

             HEMATOCRIT (BEAKER) (test code = 24.1 %       40.1-51.0    L       

     



             411)                                                

 

             MEAN CORPUSCULAR VOLUME (BEAKER) 88.6 fL      79.0-92.2            

     



             (test code = 753)                                        

 

             MEAN CORPUSCULAR HEMOGLOBIN 29.0 pg      25.7-32.2                 



             (BEAKER) (test code = 751)                                        

 

             MEAN CORPUSCULAR HEMOGLOBIN CONC 32.8 GM/DL   32.3-36.5            

     



             (BEAKER) (test code = 752)                                        

 

             RED CELL DISTRIBUTION WIDTH 13.0 %       11.6-14.4                 



             (BEAKER) (test code = 412)                                        

 

             PLATELET COUNT (BEAKER) (test code 24 K/CU MM   150-450      L     

       



             = 756)                                              

 

             MEAN PLATELET VOLUME (BEAKER) 11.9 fL      9.4-12.4                

  



             (test code = 754)                                        

 

             NUCLEATED RED BLOOD CELLS (BEAKER) 0 /100 WBC   0-0                

       



             (test code = 413)                                        



POCT-GLUCOSE [id number] 15:45:00





             Test Item    Value        Reference Range Interpretation Comments

 

             POC-GLUCOSE METER 118 mg/dL           H            : TESTED A

T [facility] [address]



             (BEAKER) (test code =                                        [place],



             1538)                                               [address]:



                                                                 /Techni

melissa ID



                                                                 = [id number] for LESLEE PRAJAPATI



CBC (HEMOGRAM ONLY)2020 08:51:00





             Test Item    Value        Reference Range Interpretation Comments

 

             WHITE BLOOD CELL COUNT (BEAKER) 9.4 K/ L     3.5-10.5              

    



             (test code = 775)                                        

 

             RED BLOOD CELL COUNT (BEAKER) 3.18 M/ L    4.63-6.08    L          

  



             (test code = 761)                                        

 

             HEMOGLOBIN (BEAKER) (test code = 9.5 GM/DL    13.7-17.5    L       

     



             410)                                                

 

             HEMATOCRIT (BEAKER) (test code = 27.8 %       40.1-51.0    L       

     



             411)                                                

 

             MEAN CORPUSCULAR VOLUME (BEAKER) 87.4 fL      79.0-92.2            

     



             (test code = 753)                                        

 

             MEAN CORPUSCULAR HEMOGLOBIN 29.9 pg      25.7-32.2                 



             (BEAKER) (test code = 751)                                        

 

             MEAN CORPUSCULAR HEMOGLOBIN CONC 34.2 GM/DL   32.3-36.5            

     



             (BEAKER) (test code = 752)                                        

 

             RED CELL DISTRIBUTION WIDTH 12.6 %       11.6-14.4                 



             (BEAKER) (test code = 412)                                        

 

             PLATELET COUNT (BEAKER) (test 106 K/CU MM  150-450      L          

  



             code = 756)                                         

 

             MEAN PLATELET VOLUME (BEAKER) 11.9 fL      9.4-12.4                

  



             (test code = 754)                                        

 

             NUCLEATED RED BLOOD CELLS 0 /100 WBC   0-0                       



             (BEAKER) (test code = 413)                                        



Hemoglobin [id number] 08:16:00





             Test Item    Value        Reference Range Interpretation Comments

 

             Hemoglobin A1C (test code = 4548-4) 5.5 %        4.3-6.1           

        

 

             Lab Interpretation (test code = Normal                             

    



             43747-6)                                            



[id number]





             Test Item    Value        Reference Range Interpretation Comments

 

             HEMOGLOBIN A1C (BEAKER) (test code = 5.5 %        4.3-6.1          

         



             368)                                                



ABORH, [id number] 03:54:00





             Test Item    Value        Reference Range Interpretation Comments

 

             ABO Grouping (test code = 2588) A                                  

    

 

             Rh Factor (test code = 2589) NEG                                   

 



[facility] [id number] 03:35:00





             Test Item    Value        Reference Range Interpretation Comments

 

             ABO/RH AUTOMATED (BEAKER) (test A NEGATIVE                         

    



             code = 2260)                                        

 

             Ab Scrn (test code = 890-4) NEGATIVE                               



[facility]/[id number] 03:30:00





             Test Item    Value        Reference Range Interpretation Comments

 

             Protime (test code = 15.6         11.9- 14.2   H            



             5902-2)                   seconds                   

 

             INR (test code = 1.3          <=5.9                     



             6301-6)                                             

 

             GANGA (test code = GANGA) Effective 2019:                         

  



                          PT Reference Range                           



                          ChangeNew: 11.9-14.2                           



                          Previous: 11.7-14.7                           



                          RECOMMENDED                            



                          COUMADIN/WARFARIN INR                           



                          THERAPY RANGESSTANDARD                           



                          DOSE: 2.0-3.0                           



                          Includes: PROPHYLAXIS                           



                          for venous thrombosis,                           



                          systemic embolization;                           



                          TREATMENT for venous                           



                          thrombosis and/or                           



                          pulmonary embolus.HIGH                           



                          RISK: Target INR is                           



                          2.5-3.5 for patients                           



                          wiht mechanical heart                           



                          valves.                                

 

             Lab Interpretation Abnormal                               



             (test code = 40959-3)                                        



[facility]2220-36-[facility] 03:30:00





             Test Item    Value        Reference Range Interpretation Comments

 

             PROTIME (BEAKER) (test code = 15.6 seconds 11.9-14.2    H          

  



             759)                                                

 

             INR (BEAKER) (test code = 370) 1.3          <=5.9                  

   



Effective 2019: PT Reference Range ChangeNew: 11.9-14.2  Previous: 11.7-
14.7RECOMMENDED COUMADIN/WARFARIN INR THERAPY RANGESSTANDARD DOSE: 2.0-3.0  
Includes: PROPHYLAXIS for venous thrombosis, systemic embolization; TREATMENT 
for venous thrombosis and/or pulmonary embolus.HIGH RISK: Target INR is2.5-3.5 
for patients wiht mechanical heart valves.Comprehensive metabolic panel
2020 03:14:00





             Test Item    Value        Reference Range Interpretation Comments

 

             Protein, Total (test 4.0          6.0- 8.3 gm/dL L            



             code = 2885-2)                                        

 

             Albumin (test code = 2.1 g/dL     3.5-5        L            



             45098-3)                                            

 

             Alkaline Phosphatase 77 U/L                           



             (test code = 6768-6)                                        

 

             Total Bilirubin (test 1.4 mg/dL    0.2-1.2      H            



             code = -2)                                        

 

             Sodium (test code = 131 meq/L    136-145      L            



             2951-2)                                             

 

             Potassium (test code = 4.8 meq/L    3.5-5.1                   



             2823-3)                                             

 

             Chloride (test code = 106 meq/L                        



             2075-0)                                             

 

             CO2 (test code = 21 meq/L     22-29        L            



             2028-9)                                             

 

             BUN (test code = 18 mg/dL     7-21                      



             3094-0)                                             

 

             Creatinine (test code 0.69 mg/dL   0.57-1.25                 



             = 2160-0)                                           

 

             Glucose (test code = 209 mg/dL           H            



             2345-7)                                             

 

             Calcium (test code = 7.0 mg/dL    8.4-10.2     L            



             94591-0)                                            

 

             AST (test code = 12 U/L       5-34                      



             1920-8)                                             

 

             ALT (test code = 12 U/L       6-55                      



             1742-6)                                             

 

             EGFR (test code = 113          mL/min/1.73 sq m              ESTIMA

AGUILAR GFR IS



             33914-3)                                            NOT AS ACCURATE



                                                                 AS CREATININE



                                                                 CLEARANCE IN



                                                                 PREDICTING



                                                                 GLOMERULAR



                                                                 FILTRATION RATE

.



                                                                 ESTIMATED GFR I

S



                                                                 NOT APPLICABLE



                                                                 FOR DIALYSIS



                                                                 PATIENTS.

 

             GANGA (test code = GANGA)  ID -                           



                          BS                                     

 

             Lab Interpretation Abnormal                               



             (test code = 25257-6)                                        



[facility] [id number] 03:14:00





             Test Item    Value        Reference Range Interpretation Comments

 

             TOTAL PROTEIN 4.0 gm/dL    6.0-8.3      L            



             (BEAKER) (test code =                                        



             770)                                                

 

             ALBUMIN (BEAKER) 2.1 g/dL     3.5-5.0      L            



             (test code = 1145)                                        

 

             ALKALINE PHOSPHATASE 77 U/L                           



             (BEAKER) (test code =                                        



             346)                                                

 

             BILIRUBIN TOTAL 1.4 mg/dL    0.2-1.2      H            



             (BEAKER) (test code =                                        



             377)                                                

 

             SODIUM (BEAKER) (test 131 meq/L    136-145      L            



             code = 381)                                         

 

             POTASSIUM (BEAKER) 4.8 meq/L    3.5-5.1                   



             (test code = 379)                                        

 

             CHLORIDE (BEAKER) 106 meq/L                        



             (test code = 382)                                        

 

             CO2 (BEAKER) (test 21 meq/L     22-29        L            



             code = 355)                                         

 

             BLOOD UREA NITROGEN 18 mg/dL     7-21                      



             (BEAKER) (test code =                                        



             354)                                                

 

             CREATININE (BEAKER) 0.69 mg/dL   0.57-1.25                 



             (test code = 358)                                        

 

             GLUCOSE RANDOM 209 mg/dL           H            



             (BEAKER) (test code =                                        



             652)                                                

 

             CALCIUM (BEAKER) 7.0 mg/dL    8.4-10.2     L            



             (test code = 697)                                        

 

             AST (SGOT) (BEAKER) 12 U/L       5-34                      



             (test code = 353)                                        

 

             ALT (SGPT) (BEAKER) 12 U/L       6-55                      



             (test code = 347)                                        

 

             EGFR (BEAKER) (test 113                                    ESTIMATE

D GFR IS



             code = 1092) mL/min/1.73 sq                           NOT AS ACCURA

TE AS



                          m                                      CREATININE



                                                                 CLEARANCE IN



                                                                 PREDICTING



                                                                 GLOMERULAR



                                                                 FILTRATION RATE

.



                                                                 ESTIMATED GFR I

S



                                                                 NOT APPLICABLE 

FOR



                                                                 DIALYSIS PATIEN

TS.



 ID - [id number] 03:10:00





             Test Item    Value        Reference Range Interpretation Comments

 

             Magnesium (test code = 1.7 mg/dL    1.6-2.6                   



             00234-0)                                            

 

             GANGA (test code = GANGA)  ID - BS                           

 

             Lab Interpretation (test Normal                                 



             code = 69501-8)                                        



[facility]





             Test Item    Value        Reference Range Interpretation Comments

 

             MAGNESIUM (BEAKER) (test code = 1.7 mg/dL    1.6-2.6               

    



             627)                                                



 ID - BSLactic acid, [id number] 03:02:00





             Test Item    Value        Reference Range Interpretation Comments

 

             Lactate, Venous (test code = 1.75 mmol/L  0.5-2.2                  

 



             2872)                                               

 

             GANGA (test code = GANGA)  ID - BS                           

 

             Lab Interpretation (test Normal                                 



             code = 90144-6)                                        



[facility]CT[facility] ACID, [id number] 03:02:00





             Test Item    Value        Reference Range Interpretation Comments

 

             LACTATE BLOOD VENOUS (2) (BEAKER) 1.75 mmol/L  0.50-2.20           

      



             (test code = 2872)                                        



 ID - BSCBC W/PLT COUNT &amp; AUTO [id number] 03:01:00





             Test Item    Value        Reference Range Interpretation Comments

 

             WHITE BLOOD CELL COUNT (BEAKER) 9.6 K/ L     3.5-10.5              

    



             (test code = 775)                                        

 

             RED BLOOD CELL COUNT (BEAKER) 2.80 M/ L    4.63-6.08    L          

  



             (test code = 761)                                        

 

             HEMOGLOBIN (BEAKER) (test code = 8.3 GM/DL    13.7-17.5    L       

     



             410)                                                

 

             HEMATOCRIT (BEAKER) (test code = 24.8 %       40.1-51.0    L       

     



             411)                                                

 

             MEAN CORPUSCULAR VOLUME (BEAKER) 88.6 fL      79.0-92.2            

     



             (test code = 753)                                        

 

             MEAN CORPUSCULAR HEMOGLOBIN 29.6 pg      25.7-32.2                 



             (BEAKER) (test code = 751)                                        

 

             MEAN CORPUSCULAR HEMOGLOBIN CONC 33.5 GM/DL   32.3-36.5            

     



             (BEAKER) (test code = 752)                                        

 

             RED CELL DISTRIBUTION WIDTH 12.6 %       11.6-14.4                 



             (BEAKER) (test code = 412)                                        

 

             PLATELET COUNT (BEAKER) (test 114 K/CU MM  150-450      L          

  



             code = 756)                                         

 

             MEAN PLATELET VOLUME (BEAKER) 11.5 fL      9.4-12.4                

  



             (test code = 754)                                        

 

             NUCLEATED RED BLOOD CELLS 0 /100 WBC   0-0                       



             (BEAKER) (test code = 413)                                        

 

             NEUTROPHILS RELATIVE PERCENT 86 %                                  

 



             (BEAKER) (test code = 429)                                        

 

             LYMPHOCYTES RELATIVE PERCENT 5 %                                   

 



             (BEAKER) (test code = 430)                                        

 

             MONOCYTES RELATIVE PERCENT 9 %                                    



             (BEAKER) (test code = 431)                                        

 

             EOSINOPHILS RELATIVE PERCENT 0 %                                   

 



             (BEAKER) (test code = 432)                                        

 

             BASOPHILS RELATIVE PERCENT 0 %                                    



             (BEAKER) (test code = 437)                                        

 

             NEUTROPHILS ABSOLUTE COUNT 8.25 K/ L    1.78-5.38    H            



             (BEAKER) (test code = 670)                                        

 

             LYMPHOCYTES ABSOLUTE COUNT 0.50 K/ L    1.32-3.57    L            



             (BEAKER) (test code = 414)                                        

 

             MONOCYTES ABSOLUTE COUNT (BEAKER) 0.83 K/ L    0.30-0.82    H      

      



             (test code = 415)                                        

 

             EOSINOPHILS ABSOLUTE COUNT 0.01 K/ L    0.04-0.54    L            



             (BEAKER) (test code = 416)                                        

 

             BASOPHILS ABSOLUTE COUNT (BEAKER) 0.01 K/ L    0.01-0.08           

      



             (test code = 417)                                        

 

             IMMATURE GRANULOCYTES-RELATIVE 0 %          0-1                    

   



             PERCENT (BEAKER) (test code =                                      

  



             2801)                                               



RAD, CHEST, 1 VIEW, NON [id number] 02:35:00Reason for exam:-&gt;c/f 
aspirationShould this be performed at the bedside?-&gt;YesFINAL REPORT PATIENT 
ID:   [id number]  Chest one view. Clinical history: c/f aspiration Comparison: Non
e. Technique: A single frontal view of the chest was obtained.  Findings:There 
is a right IJ Port-A-Cath with tip in the SVC.The heart is normal in size. The 
aorta is uncoiled. There are bibasilar airspace opacities suspicious for 
pneumonia. There is no pleural effusion or pneumothorax. The bony thorax is 
unremarkable.        Signed: Soo Waldropeport Verified Date/Time:  
2020 02:35:34Electronically signed by: SOO WALDROP MD on 
2020 02:35 AMXR chest 1 view portable / [id number] 02:35:00
Interface, External Ris In - 2020  2:38 AM CDTFINAL REPORT PATIENT ID:   
[id number]  Chest one view. Clinical history: c/f aspiration Comparison: None. 
Technique: A single frontal view of the chestwas obtained.  Findings:There is a 
right IJ Port-A-Cath with tip in the SVC.The heart is normal in size. The aorta 
is uncoiled. There are bibasilar airspace opacities suspicious for pneumonia. 
There isno pleural effusion or pneumothorax. The bony thorax is unremarkable.   
    Signed: Soo Waldrop MDReport Verified Date/Time:  2020 02:35:34     
Electronically signed by: SOO WALDROP MD on 2020 02:35 [facility] CenterFL, FLUORO, NON-SPECIFIC, UP TO 1 [id number] 12:58:00
Reason for exam:-&gt;dysphagiaFINAL REPORT PATIENT ID:   [id number] A fluoroscopic
unit was utilized for a procedure performed in the operating room. No 
interpretation was requested. Please refer to the operative report regarding 
findings. Please refer to PACS for patient radiation dose information. Signed: 
JR Borrego Robert MDReport Verified Date/Time:  2020 12:58:42 
Reading Location: [facility] Radiology Reading Room    Electronically signed 
by: ALFREDO BORREGO on 2020 12:58 PMFL fluoro non-specific up to 1 
[id number] 12:58:00Interface, External Ris In - 2020  1:00 PM CDTFINAL
REPORT PATIENT ID:   [id number] A fluoroscopic unit was utilized for a procedure 
performed in the operating room. No interpretation was requested. Please refer 
to the operative report regarding findings. Please refer to PACS for patient 
radiationdose information. Signed: JR Borrego Robert MDReport Verified 
Date/Time:  2020 12:58:42Reading Location: [facility] Radiology Reading
Room    Electronically signed by: ALFREDO BORREGO on 2020 12:58 PM
[facility] [id number] 14:56:41Hm Interface, 
Radiology Results Incoming 2020  2:59 PM CDTEXAMINATION:  IR PORT 
PLACEMENTCLINICAL HISTORY:  C15.9 Malignant neoplasm of esophagus  unspecified, 
Z01.818 Encounter for other preprocedural examination, esophageal 
cancerCOMPARISON:  None.PROCEDURE: Venous port placementProcedural Pe
rsonnelAttending physician(s): Yuri Felton MDPre-procedure diagnosis: 
Esophageal cancerPost-procedure diagnosis: SameIndication: Administration of 
chemotherapyAdditional clinical history: NoneComplications: No immediate 
complications.IMPRESSION: Insertion of right-sided power-injectable single-lumen
tunneled chest port, with catheter tip in the expected location of the 
cavoatrial junction.Plan: The port may be used immediately. 
_______________________________________________________________PROCEDURE 
SUMMARY:- Venous access with ultrasound guidance- Tunneled port insertion under 
fluoroscopic guidance- Additional procedure(s): NonePre-procedureHistory and 
imaging of central venous access reviewed (QCDR): Yes Consent: Informed consent 
for the procedure including risks, benefits and alternatives was obtained and 
time-out was performed prior to the procedure.Preparation (MIPS): The site was 
prepared and draped using all elements of maximal sterile barrier technique 
including sterile gloves, sterile gown, cap, mask, large sterile sheet, sterile 
ultrasound probe cover, hand hygiene and cutaneous antisepsis with 2% 
chlorhexidine. Medical reason for site preparation exception (MIPS): Not applica
bleAnesthesia/sedationLevel of anesthesia/sedation: Moderate sedation (conscious
sedation)Anesthesia/sedation administered by: Independent trained observer under
attending supervision with continuous monitoring of the patients level of 
consciousness and physiologic statusTotal intra-service sedation time (minutes):
43AccessLocal anesthesia was administered. The vessel was sonographically 
evaluated and determined to be patent. Real time ultrasound was used to 
visualize needle entry into the vessel and a permanent image was not stored.Vein
accessed: Internal jugular veinAccess technique: Micropuncture set with 21 gauge
needlePort placementAn incision was made at the upper chest, a pocket was 
created, and the catheter was tunneled subcutaneously to the venous access site 
and trimmed to appropriate length. The port was inserted into the pocket and the
catheter was advanced via a peel-away sheath into the vein under fluoroscopic 
guidance. The port was not sutured into the pocket. Catheter tip location was 
fluoroscopically verified and a permanent image was stored.Port placed: 
Angiodynamics SmartPortCatheter size (French): 6Catheter flush: Heparin (100 
units/mL)ClosureThe access site and incision were closed and sterile dressing(s)
were applied.Access site closure technique: Tissue adhesiveIncision closure 
technique: Absorbable suture and tissue adhesivePatient discharged from 
procedure suite with device accessed: NoContrastContrast agent: NoneContrast 
volume (mL): 0Radiation DoseReference air kerma (mGy): 7 Additional 
DetailsAdditional description of procedure: NoneEquipment details: NoneSpecimens
removed: NoneEstimated blood loss (mL): Less than 10Standardized report: 
[id number] MethodistXR Chest External [id number] 
20:37:35This exam was not acquired at a [facility] facility and has not been 
interpreted by a [facility] Provider.  The exam was imported into our imaging 
system.[facility] MethodistCT Abd/Pelvic External [id number] 20:37:05This exam
was not acquired at a [facility] facility and has not been interpreted by a 
[facility] Provider.  The exam was imported into our imaging system.[facility] 
MethodistCancer antigen [id number] 13:00:57





             Test Item    Value        Reference Range Interpretation Comments

 

             CA 19-9 (test code 1 U/mL       0-35                      The Roche

 Moises 8000 



             = 1006)                                             immunoassay was

 used. Results



                                                                 obtained with d

ifferent assay



                                                                 methods or kits

 should not be



                                                                 used interchang

eably and may



                                                                 be different.



[facility] MethodistCarcinoembryonic antigen (CEA)2020 13:00:10





             Test Item    Value        Reference Range Interpretation Comments

 

             CEA (test code = 3.3 ng/mL    0-3.8                     Reference r

belkis for heavy



             -)                                             smokers:  0.0 -

 5.5



                                                                 ng/mLThe ROCHE 

Moises 8000



                                                                 CEA immunoassay

 was used.



                                                                 Results obtaine

d with



                                                                 different assay

 methods or



                                                                 kits should not

 be used



                                                                 interchangeably

 and may be



                                                                 different.



[facility] MethodistXR Chest 2 [id number] 12:38:27Hm Interface, Radiology Results
2020 12:41 PM CDTEXAMINATION:  XR CHEST 2 VWCLINICAL HISTORY:  
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
 NoneIMPRESSION:1.Examination was performed with nipple markers.2.No pulmonary 
infiltrates or nodules are seen.3.Heart size within normal limits. Vessels are 
not congested.[id number] Crawford

## 2020-08-01 NOTE — RAD REPORT
EXAM DESCRIPTION:  RAD - Chest Single View - 8/1/2020 4:30 am

 

CLINICAL HISTORY:  syncope

Chest pain.

 

COMPARISON:  Chest Single View dated 4/13/2020; Chest Pa And Lat (2 Views) dated 3/5/2020; CHEST SING
LE VIEW dated 11/6/2013

 

FINDINGS:  Portable technique limits examination quality.

 

The lungs are grossly clear. The heart is normal in size. No displaced fractures.Right-sided port cat
heter its tip in the SVC.

 

IMPRESSION:  No acute intrathoracic process suspected.

## 2020-08-01 NOTE — XMS REPORT
Clinical Summary

                            Created on:2020



Patient:Megan Ponce

Sex:Male

:1950

External Reference #:[id number]





Demographics







                          Address                   [address] [place] [address]

 

                          Home Phone                [phone]

 

                          Email Address             [email or web address]

[email or web address]

 

                          Preferred Language        English

 

                          Marital Status            Unknown

 

                          Pentecostal Affiliation     Unknown

 

                          Race                      White

 

                          Ethnic Group              Not  or 









Author







                          Organization              [facility]

 

                          Address                   [address] [place] [address]









Support







                Name            Relationship    Address         Phone

 

                MARK TINSLEY Unavailable     Unavailable     [phone] 









Care Team Providers







                    Name                Role                Phone

 

                    Sujit Harris MD  Primary Care Provider [phone]









Allergies







             Active Allergy Reactions    Severity     Noted Date   Comments

 

             Sulfur       Other (See Comments)              2020   Flu lik

e symptoms







Medications







          Medication Sig       Dispensed Refills   Start Date End Date  Status

 

          lisinopriL Take 5 mg by           0                             Active



          (PRINIVIL,ZESTRIL) 5 MG mouth daily.                                  

       



          tablet                                                      

 

          ondansetron (ZOFRAN-ODT) Take 8 mg by           0                     

        Active



          8 MG disintegrating mouth 2 (two)                                     

    



          tablet    times daily as                                         



                    needed for                                         



                    Nausea.                                           

 

          combination formulary                     0                           

  Active



          medication                                                   

 

          aspirin 81 MG EC tablet Take 81 mg by           0                     

        Active



                    mouth daily.                                         

 

          pantoprazole (PROTONIX) Take 1 tablet 180 tablet 0         2020 

          Active



          40 MG tablet (40 mg total)                                         



                    by mouth 2                                         



                    (two) times                                         



                    daily.                                            







Active Problems







                          Problem                   Noted Date

 

                          Hematemesis               2020







Encounters







             Date         Type         Specialty    Care Team    Description

 

             2020   Anesthesia Event Gastroenterology Jose Luis Kern CRNA 

 

             2020   Surgery      Gastroenterology Jj Sharma UPPER EN

DOSCOPY



                                                    MD           

 

             2020   Hospital     General Internal Anyi Alvarez, Hemate

mesis, presence of 

nausea not specified;



                -               Encounter       Medicine        MD Jamal



                                        Acute upper GI bleed;



             2020                             Parth,      Acute blood los

s anemia;



                                                    Leo  Malignant neopl

asm of lower third of esophagus (HCC)



                                                                MD Zak Mckeon Kimberly Ann, MD      

 

             2020   Travel                                 

 

             2020   Telephone    Critical Care Medicine Anyi Alvarez, 

transfer



                                                    MD Jamal   

 

             2020   Surgery      Gastroenterology Kacie Cartwright MD     ENDOSCOPY,WALL



                                                                 STENT

 

             2020   Anesthesia Event Gastroenterology Perry Stephenson MD   

 

             2020   Hospital     Gastroenterology Kacie Cartwright MD     

 

             2020   Hospital     Pre-Admission Testing Kacie Cartwright MD     

 

             2020   Travel                                 



after 2019



Social History







             Tobacco Use  Types        Packs/Day    Years Used   Date

 

             Current Every Day Smoker                                        









                Smokeless Tobacco: Never Used                                 









                                        Comments: 1 cigar daily









                Alcohol Use     Drinks/Week     oz/Week         Comments

 

                Yes                                             very rarely









                    Alcohol Habits      Answer              Date Recorded

 

                    How often do you have a drink containing alcohol? Never     

          2020

 

                    How many drinks containing alcohol do you have on a typical 

Not asked           



                    day when you are drinking?                     

 

                    How often do you have six or more drinks on one occasion? No

t asked           









                          Sex Assigned at Birth     Date Recorded

 

                          Not on file               









                    Job Start Date      Occupation          Industry

 

                    Not on file         Not on file         Not on file









                    Travel History      Travel Start        Travel End









                                        No recent travel history available.







Last Filed Vital Signs







                    Vital Sign          Reading             Time Taken

 

                    Blood Pressure      110/77              2020  7:50 AM 

CDT

 

                    Pulse               86                  2020  7:50 AM 

CDT

 

                    Temperature         36.9 C (98.5 F) 2020  7:50 AM 

CDT

 

                    Respiratory Rate    18                  2020  7:50 AM 

CDT

 

                    Oxygen Saturation   95%                 2020  7:50 AM 

CDT

 

                    Inhaled Oxygen Concentration -                   -

 

                    Weight              72.8 kg (160 lb 6.4 oz) 2020  6:00

 AM CDT

 

                    Height              185.4 cm (6' 1")    2020  1:30 AM 

CDT

 

                    Body Mass Index     21.16               2020  6:00 AM 

CDT







Plan of Treatment

Not on file



Implants







          Implanted Type      Area       Device    Shelf     Model /



                                                  Identifier Expiration Serial /

 Lot



                                                            Date      

 

           Stent Esoph Wallflx [id number]   [id number] - [id number] IMPLANTS        

      [employer]            

2020                              [id number] /



          Implanted: Qty: 1 on 2020 by Kacie Cartwright MD              

                                      /



                                                                      [id number]







Procedures







             Procedure Name Priority     Date/Time    Associated Diagnosis Comme

nts

 

             REPORT OF PROCEDURE -              2020  3:40              



             ENDOSCOPY SCAN              PM CDT                    

 

             RHYTHM STRIP - SCAN              2020  3:40              



                                       PM CDT                    

 

             TRANSFUSION SERVICE              2020  6:01              



             REPORT - SCAN              PM CDT                    

 

             CBC W/PLT COUNT & Routine      2020  3:54              Result

s for this



             AUTO DIFFERENTIAL              AM CDT                    procedure 

are in



                                                                 the results



                                                                 section.

 

             CBC W/PLT COUNT & Routine      2020  3:54              Result

s for this



             AUTO DIFFERENTIAL              AM CDT                    procedure 

are in



                                                                 the results



                                                                 section.

 

             TRANSFUSION SERVICE              2020  5:50              



             REPORT - SCAN              PM CDT                    

 

             CBC (HEMOGRAM ONLY) Routine      2020 12:37              Resu

lts for this



                                       PM CDT                    procedure are i

n



                                                                 the results



                                                                 section.

 

             CBC (HEMOGRAM ONLY) Routine      2020  5:43              Resu

lts for this



                                       AM CDT                    procedure are i

n



                                                                 the results



                                                                 section.

 

             BASIC METABOLIC PANEL Routine      2020  5:42              Re

sults for this



             (7)                       AM CDT                    procedure are i

n



                                                                 the results



                                                                 section.

 

             CBC (HEMOGRAM ONLY) Routine      2020 12:07              Resu

lts for this



                                       AM CDT                    procedure are i

n



                                                                 the results



                                                                 section.

 

             PREPARE LEUKO-REDUCED Routine      04/15/2020 11:54              Re

sults for this



             RBC                       PM CDT                    procedure are i

n



                                                                 the results



                                                                 section.

 

             CBC (HEMOGRAM ONLY) Routine      04/15/2020  6:01              Resu

lts for this



                                       PM CDT                    procedure are i

n



                                                                 the results



                                                                 section.

 

             TRANSFUSION SERVICE              04/15/2020  5:51              



             REPORT - SCAN              PM CDT                    

 

             CBC (HEMOGRAM ONLY) Routine      04/15/2020 12:42              Resu

lts for this



                                       PM CDT                    procedure are i

n



                                                                 the results



                                                                 section.

 

             POCT-GLUCOSE METER Routine      04/15/2020  5:30              Resul

ts for this



                                       AM CDT                    procedure are i

n



                                                                 the results



                                                                 section.

 

             BASIC METABOLIC PANEL Routine      04/15/2020  4:25              Re

sults for this



             (7)                       AM CDT                    procedure are i

n



                                                                 the results



                                                                 section.

 

             CBC (HEMOGRAM ONLY) Routine      04/15/2020  4:25              Resu

lts for this



                                       AM CDT                    procedure are i

n



                                                                 the results



                                                                 section.

 

             CBC (HEMOGRAM ONLY) Routine      2020 11:33              Resu

lts for this



                                       PM CDT                    procedure are i

n



                                                                 the results



                                                                 section.

 

             POCT-GLUCOSE METER Routine      2020 11:32              Resul

ts for this



                                       PM CDT                    procedure are i

n



                                                                 the results



                                                                 section.

 

             POCT-GLUCOSE METER Routine      2020  6:14              Resul

ts for this



                                       PM CDT                    procedure are i

n



                                                                 the results



                                                                 section.

 

             POCT-GLUCOSE METER Routine      2020  3:34              Resul

ts for this



                                       PM CDT                    procedure are i

n



                                                                 the results



                                                                 section.

 

             CBC (HEMOGRAM ONLY) Routine      2020  3:25              Resu

lts for this



                                       PM CDT                    procedure are i

n



                                                                 the results



                                                                 section.

 

             REPORT OF PROCEDURE -              2020 12:49              



             ENDOSCOPY URL              PM CDT                    

 

             UPPER ENDOSCOPY              2020 11:00 Gastrointestinal 



                                       AM CDT       hemorrhage, unspecified 



                                                    gastrointestinal 



                                                    hemorrhage type 

 

             CBC (HEMOGRAM ONLY) Routine      2020  8:25              Resu

lts for this



                                       AM CDT                    procedure are i

n



                                                                 the results



                                                                 section.

 

             TRANSFUSE    Routine      2020  7:19              



             LEUKO-REDUCED RED              AM CDT                    



             BLOOD CELLS                                         

 

             HEMOGLOBIN A1C Routine      2020  4:55              Results f

or this



                                       AM CDT                    procedure are i

n



                                                                 the results



                                                                 section.

 

             BLOOD CULTURE Routine      2020  4:54              Results fo

r this



                                       AM CDT                    procedure are i

n



                                                                 the results



                                                                 section.

 

             ABORH, MANUAL STAT         2020  3:11              Results fo

r this



                                       AM CDT                    procedure are i

n



                                                                 the results



                                                                 section.

 

             BLOOD CULTURE Routine      2020  2:53              Results fo

r this



                                       AM CDT                    procedure are i

n



                                                                 the results



                                                                 section.

 

             TYPE AND SCREEN, STAT         2020  2:52              Results

 for this



             AUTOMATED                 AM CDT                    procedure are i

n



                                                                 the results



                                                                 section.

 

             CBC W/PLT COUNT & Routine      2020  2:40              Result

s for this



             AUTO DIFFERENTIAL              AM CDT                    procedure 

are in



                                                                 the results



                                                                 section.

 

             PROTHROMBIN TIME/INR Routine      2020  2:40              Res

ults for this



                                       AM CDT                    procedure are i

n



                                                                 the results



                                                                 section.

 

             LACTIC ACID, VENOUS Routine      2020  2:40              Resu

lts for this



                                       AM CDT                    procedure are i

n



                                                                 the results



                                                                 section.

 

             MAGNESIUM    Routine      2020  2:40              Results for

 this



                                       AM CDT                    procedure are i

n



                                                                 the results



                                                                 section.

 

             COMPREHENSIVE Routine      2020  2:40              Results fo

r this



             METABOLIC PANEL              AM CDT                    procedure ar

e in



                                                                 the results



                                                                 section.

 

             CBC W/PLT COUNT & Routine      2020  2:40              Result

s for this



             AUTO DIFFERENTIAL              AM CDT                    procedure 

are in



                                                                 the results



                                                                 section.

 

             XR CHEST 1 VIEW STAT         2020  2:28              Results 

for this



             PORTABLE/BEDSIDE              AM CDT                    procedure a

re in



                                                                 the results



                                                                 section.

 

             REPORT OF PROCEDURE -              2020 11:35              



             ENDOSCOPY URL              AM CDT                    

 

             FL FLUORO    Routine      2020 11:30              Results for

 this



             NON-SPECIFIC UP TO 1              AM CDT                    procedu

re are in



             HOUR                                                the results



                                                                 section.

 

             PROCEDURE W/ C-ARM              2020 11:00 Dysphagia, unspeci

fied 



                                                AM CDT          type



                                        



                                                    Malignant neoplasm of 



                                                    esophagus, unspecified 



                                                    location (HCC) 









                                                    Special Needs







                                                    (C-ARM)









                UPPER ENDOSCOPY,WALL STENT                 2020 11:00 AM C

DT Dysphagia, unspecified type



                                        



                                                    Malignant neoplasm of 



                                                    esophagus, unspecified 



                                                    location (HCC) 









                                                    Special Needs







                                                    (C-ARM)



after 2019



Results

EKG-SCANNED (2020  3:40 PM CDT)





                          Narrative                 Performed At

 

                                        This result has an attachment that is no

t available.



                                        



RHYTHM STRIP - SCAN (2020  3:40 PM CDT)





                          Narrative                 Performed At

 

                                        This result has an attachment that is no

t available.



                                        



TRANSFUSION SERVICE REPORT - SCAN (2020  6:01 PM CDT)Only the most recent 
of3 resultswithin the time period is included.





                          Narrative                 Performed At

 

                                        This result has an attachment that is no

t available.



                                        



CBC with platelet count + automated diff (2020  3:54 AM CDT)Only the most 
recent of2 resultswithin the time period is included.





                Component       Value           Ref Range       Performed At

 

                WBC             4.8             3.5 - 10.5 K/L [facility]

 

                RBC             2.58 (L)        4.63 - 6.08 M/L [facility]

 

                Hemoglobin      7.5 (L)         13.7 - 17.5 GM/DL [facility]

 

                Hematocrit      22.9 (L)        40.1 - 51.0 %   [facility]

 

                MCV             88.8            79.0 - 92.2 fL  [facility]

 

                MCH             29.1            25.7 - 32.2 pg  [facility]

 

                MCHC            32.8            32.3 - 36.5 GM/DL [facility]

 

                RDW             12.9            11.6 - 14.4 %   [facility]

 

                Platelets       160             150 - 450 K/CU MM [facility]

 

                MPV             11.0            9.4 - 12.4 fL   [facility]

 

                nRBC            0               0 - 0 /100 WBC  [facility]

 

                % Neutros       42              %               [facility]

[facility]

 

                % Lymphs        29              %               [facility]

[facility]

 

                % Monos         19              %               [facility]

[facility]

 

                % Eos           10              %               [facility]

 

                % Baso          0               %               [facility]

 

                # Neutros       2.00            1.78 - 5.38 K/L [facility]

 

                # Lymphs        1.36            1.32 - 3.57 K/L [facility]

 

                # Monos         0.88 (H)        0.30 - 0.82 K/L [facility]

 

                # Eos           0.48            0.04 - 0.54 K/L [facility]

 

                # Baso          0.02            0.01 - 0.08 K/L [facility]

 

                Immature Granulocytes-Relative 0               0 - 1 %         C

[facility]









                                        Specimen

 

                                        Blood









                Performing Organization Address         City/State/Zipcode Phone

 Number

 

                [facility] [address] [place]

 [address] [phone]



                CENTER                                          



CBC (Hemogram only) (2020 12:37 PM CDT)Only the most recent of9 results
within the time period is included.





                Component       Value           Ref Range       Performed At

 

                WBC             5.1             3.5 - 10.5 K/L [facility]

 

                RBC             2.58 (L)        4.63 - 6.08 M/L [facility]

 

                Hemoglobin      7.7 (L)         13.7 - 17.5 GM/DL [facility]

 

                Hematocrit      23.5 (L)        40.1 - 51.0 %   [facility]

 

                MCV             91.1            79.0 - 92.2 fL  [facility]

 

                MCH             29.8            25.7 - 32.2 pg  [facility]

 

                MCHC            32.8            32.3 - 36.5 GM/DL [facility]

 

                RDW             13.0            11.6 - 14.4 %   [facility]

 

                Platelets       137 (L)         150 - 450 K/CU MM [facility]

 

                MPV             10.8            9.4 - 12.4 fL   [facility]

 

                nRBC            0               0 - 0 /100 WBC  [facility]









                                        Specimen

 

                                        Blood









                Performing Organization Address         City/State/Zipcode Phone

 Number

 

                [facility] [address] [place]

 [address] [phone]



                CENTER                                          



Basic Metabolic Panel (2020  5:42 AM CDT)Only the most recent of2 results
within the time period is included.





                Component       Value           Ref Range       Performed At

 

                Sodium          134 (L)         136 - 145 meq/L [facility]

 

                Potassium       3.5             3.5 - 5.1 meq/L [facility]

 

                Chloride        105             98 - 107 meq/L  [facility]

 

                CO2             26              22 - 29 meq/L   [facility]

 

                BUN             7               7 - 21 mg/dL    [facility]

 

                Creatinine      0.68            0.57 - 1.25 mg/dL [facility]

 

                Glucose         108 (H)         70 - 105 mg/dL  [facility]

 

                Calcium         7.5 (L)         8.4 - 10.2 mg/dL [facility]

 

                EGFR            115Comment: ESTIMATED GFR IS mL/min/1.73 sq m [facility]

[facility]



                                NOT AS ACCURATE AS CREATININE                 ME

DICAL CENTER



                                CLEARANCE IN PREDICTING                 



                                GLOMERULAR FILTRATION RATE.                 



                                ESTIMATED GFR IS NOT                 



                                APPLICABLE FOR DIALYSIS                 



                                PATIENTS.                       









                                        Specimen

 

                                        Blood









                          Narrative                 Performed At

 

                           ID - PHYLLIS SOLOMON     [facility]

ICAL CENTER









                Performing Organization Address         City/[facility] Phone

 Number

 

                [facility] MEDICAL [address] [place]

 [address] [phone]



                CENTER                                          



Prepare Leuko-Red RBC (04/15/2020 11:54 PM CDT)





                Component       Value           Ref Range       Performed At

 

                CROSSMATCH      COMPATIBLE                      [facility]

 

                Unit ABO        A Neg                           [facility]

 

                UNIT NUMBER     [id number]                   [facility]

 

                Status          WORK IN PROGRESS                 [facility]

 

                Blood Bank Product RED BLOOD CELLS                 [facility]

 

                PRODUCT CODE    [id number]                        [facility]

 

                CROSSMATCH      COMPATIBLE                      [facility]

 

                Unit ABO        A Neg                           [facility]

 

                UNIT NUMBER     [id number]                   [facility]

 

                Status          TX_TIMEINCHART                  [facility]

 

                Blood Bank Product RED BLOOD CELLS                 [facility]

 

                PRODUCT CODE    [id number]                        [facility]

 

                CROSSMATCH      COMPATIBLE                      [facility]

 

                Unit ABO        A Neg                           [facility]

 

                UNIT NUMBER     [id number]                   [facility]

 

                Status          WORK IN PROGRESS                 [facility]

 

                Blood Bank Product RED BLOOD CELLS                 [facility]

 

                PRODUCT CODE    [id number]                        [facility]

 

                CROSSMATCH      COMPATIBLE                      [facility]

 

                Unit ABO        A Neg                           [facility]

 

                UNIT NUMBER     [id number]                   [facility]

 

                Status          WORK IN PROGRESS                 [facility]

 

                Blood Bank Product RED BLOOD CELLS                 [facility]

 

                PRODUCT CODE    [id number]                        [facility]









                                        Specimen

 

                                        Other









                Performing Organization Address         City/[facility] Phone

 Number

 

                [facility]                                    



POC-Glucose meter (04/15/2020  5:30 AM CDT)Only the most recent of4 results
within the time period is included.





                Component       Value           Ref Range       Performed At

 

                POC-Glucose Meter 112 (H)Comment: : TESTED 70 - 110 mg/dL  [facility]



                                AT [facility] [address]

NTER



                                [facility], [address]:                 



                                /Technician ID =                 



                                [id number] for ELISHA FRITZ                 









                                        Specimen

 

                                        Blood









                Performing Organization Address         City/[facility]/Zipcode Phone

 Number

 

                [facility] MEDICAL [address] [place]

 [address] [phone]



                CENTER                                          



REPORT OF PROCEDURE - ENDOSCOPY URL (2020 12:49 PM CDT)





                          Narrative                 Performed At

 

                                        This result has an attachment that is no

t available.



                                        



Transfuse Leuko-Red RBC (2020  7:19 AM CDT)Hemoglobin A1c (2020  
4:55 AM CDT)





                Component       Value           Ref Range       Performed At

 

                Hemoglobin A1C  5.5             4.3 - 6.1 %     [facility]









                                        Specimen

 

                                        Blood









                Performing Organization Address         City/[facility]/Zipcode Phone

 Number

 

                [address]

 [address] [phone]



                CENTER                                          



Blood Culture - Routine (Left Venipuncture) (2020  4:54 AM CDT)Only the 
most recent of2 resultswithin the time period is included.





                Component       Value           Ref Range       Performed At

 

                Result          No growth in 5 days                 [facility]









                                        Specimen

 

                                        Blood









                Performing Organization Address         City/[facility] Phone

 Number

 

                [address]

 [address] [phone]



                CENTER                                          



ABORH, manual (2020  3:11 AM CDT)





                Component       Value           Ref Range       Performed At

 

                ABO Grouping    A                               [facility]

 

                Rh Factor       NEG                             [facility]









                                        Specimen

 

                                        Blood









                Performing Organization Address         City/[facility] Phone

 Number

 

                [address] [address] [phone]



Type and screen, automated (2020  2:52 AM CDT)





                Component       Value           Ref Range       Performed At

 

                ABO/RH AUTOMATED (BEAKER) A NEGATIVE                      [facility]

 

                Ab Scrn         NEGATIVE                        [facility]









                                        Specimen

 

                                        Blood









                Performing Organization Address         City/[facility] Phone

 Number

 

                [address] [address] [phone]



Lactic acid, venous (2020  2:40 AM CDT)





                Component       Value           Ref Range       Performed At

 

                Lactate, Venous 1.75            0.50 - 2.20 mmol/L [facility]









                                        Specimen

 

                                        Blood









                          Narrative                 Performed At

 

                           ID - BS          [facility] MED

ICAL CENTER









                Performing Organization Address         City/[facility] Phone

 Number

 

                [address]

 [address] [phone]



                CENTER                                          



Prothrombin time/INR (2020  2:40 AM CDT)





                Component       Value           Ref Range       Performed At

 

                Protime         15.6 (H)        11.9 - 14.2 seconds [facility]

 

                INR             1.3             <=5.9           [facility]









                                        Specimen

 

                                        Blood









                          Narrative                 Performed At

 

                          Effective 2019: PT Reference Range [facility]



                                        Change



                                        



                                        New: 11.9-14.2Previous: 11.7-14.7



                                        



                                         



                                        



                          RECOMMENDED COUMADIN/WARFARIN INR THERAPY 



                                        RANGES



                                        



                          STANDARD DOSE: 2.0-3.0Includes: 



                          PROPHYLAXIS for venous thrombosis, systemic 



                          embolization; TREATMENT for venous thrombosis 



                                        and/or pulmonary embolus.



                                        



                          HIGH RISK: Target INR is 2.5-3.5 for patients 



                          wiht mechanical heart valves. 









                Performing Organization Address         City/State/Zipcode Phone

 Number

 

                [address]

 [address] [phone]



                CENTER                                          



Magnesium (2020  2:40 AM CDT)





                Component       Value           Ref Range       Performed At

 

                Magnesium       1.7             1.6 - 2.6 mg/dL [facility]









                                        Specimen

 

                                        Blood









                          Narrative                 Performed At

 

                           ID - BS          [facility]

ICAL CENTER









                Performing Organization Address         City/State/Zipcode Phone

 Number

 

                [address]

 [address] [phone]



                CENTER                                          



Comprehensive metabolic panel (2020  2:40 AM CDT)





                Component       Value           Ref Range       Performed At

 

                Protein, Total  4.0 (L)         6.0 - 8.3 gm/dL [facility]

ALTH



                                                                [facility]ENT

ER

 

                Albumin         2.1 (L)         3.5 - 5.0 g/dL  [facility]

ALTH



                                                                [facility]ENT

ER

 

                Alkaline Phosphatase 77              40 - 150 U/L    [facility] MEDICAL [facility]

ER

 

                Total Bilirubin 1.4 (H)         0.2 - 1.2 mg/dL [facility]

ALTH



                                                                [facility] MEDICAL [facility]

ER

 

                Sodium          131 (L)         136 - 145 meq/L [facility]

ALTH



                                                                [facility]

ER

 

                Potassium       4.8             3.5 - 5.1 meq/L [facility]

ALTH



                                                                [facility]

ER

 

                Chloride        106             98 - 107 meq/L  [facility]

ALTH



                                                                [facility]

ER

 

                CO2             21 (L)          22 - 29 meq/L   [facility]

ALTH



                                                                [facility]

ER

 

                BUN             18              7 - 21 mg/dL    [facility]

ALTH



                                                                [facility]

ER

 

                Creatinine      0.69            0.57 - 1.25 mg/dL [facility] 

HEALTH



                                                                [facility]

ER

 

                Glucose         209 (H)         70 - 105 mg/dL  [facility]

ALTH



                                                                [facility]

ER

 

                Calcium         7.0 (L)         8.4 - 10.2 mg/dL [facility]  [facility]

EALTH



                                                                [facility]

ER

 

                AST             12              5 - 34 U/L      [facility]

ALTH



                                                                [facility]

ER

 

                ALT             12              6 - 55 U/L      [facility]

ALTH



                                                                [facility]

ER

 

                EGFR            113Comment: ESTIMATED mL/min/1.73 sq m [facility]  [facility]



                                GFR IS NOT AS ACCURATE                 [facility]



                                AS CREATININE CLEARANCE                 



                                IN PREDICTING GLOMERULAR                 



                                FILTRATION RATE.                 



                                ESTIMATED GFR IS NOT                 



                                APPLICABLE FOR DIALYSIS                 



                                PATIENTS.                       









                                        Specimen

 

                                        Blood









                          Narrative                 Performed At

 

                           ID - BS          [facility] CENTER









                Performing Organization Address         City/State/Zipcode Phone

 Number

 

                [facility] [address] [place]

 [address] [phone]



                CENTER                                          



XR chest 1 view portable / bedside (2020  2:28 AM CDT)





                                        Specimen

 

                                        









                          Narrative                 Performed At

 

                                        FINAL REPORT



                                        [facility] [place])



                                         



                                        



                                        PATIENT ID: [id number]



                                        



                                         



                                        



                                         



                                        



                                        Chest one view.



                                        



                                         



                                        



                                        Clinical history: c/f aspiration



                                        



                                         



                                        



                                        Comparison: None.



                                        



                                         



                                        



                                        Technique: A single frontal view of the 

chest was obtained. 



                                        



                                         



                                        



                                        Findings:



                                        



                                        There is a right IJ Port-A-Cath with tip

 in the SVC.



                                        



                                        The heart is normal in size. The aorta i

s uncoiled. There are 



                                        



                                        bibasilar airspace opacities suspicious 

for pneumonia. There is no 



                                        



                                        pleural effusion or pneumothorax. The abdulaziz

ny thorax is unremarkable.



                                        



                                         



                                        



                                         



                                        



                                         



                                        



                                         



                                        



                                         



                                        



                                         



                                        



                                        Signed: Soo Waldrop MD



                                        



                                        Report Verified Date/Time:2020

 02:35:34



                                        



                                         



                                        



                          Electronically signed by: SOO WALDROP MD on 2020 



                                        02:35 AM



                                        



                                                    









                                        Procedure Note

 

                                        Interface, External Ris In - 2020 

 2:38 AM CDT



FINAL REPORT



                                        



                                        PATIENT ID:   [id number]



                                        



                                        



                                        Chest one view.



                                        



                                        Clinical history: c/f aspiration



                                        



                                        Comparison: None.



                                        



                                        Technique: A single frontal view of the 

chest was obtained.



                                        



                                        Findings:



                                        There is a right IJ Port-A-Cath with tip

 in the SVC.



                                        The heart is normal in size. The aorta i

s uncoiled. There are



                                        bibasilar airspace opacities suspicious 

for pneumonia. There is no



                                        pleural effusion or pneumothorax. The abdulaziz

ny thorax is unremarkable.



                                        



                                        



                                        



                                        



                                        



                                        



                                        Signed: Soo Waldrop MD



                                        Report Verified Date/Time:  2020 0

2:35:34



                                        



                                            Electronically signed by: SOO WALDROP MD on 2020 02:35 AM



                                        









                Performing Organization Address         City/State/Zipcode Phone

 Number

 

                [employer] [facility]                                          



REPORT OF PROCEDURE - ENDOSCOPY URL (2020 11:35 AM CDT)





                          Narrative                 Performed At

 

                                        This result has an attachment that is no

t available.



                                        



FL fluoro non-specific up to 1 hour (2020 11:30 AM CDT)





                                        Specimen

 

                                        









                          Narrative                 Performed At

 

                                        FINAL REPORT



                                        [facility]



                                         



                                        



                                        PATIENT ID: [id number]



                                        



                                         



                                        



                                        A fluoroscopic unit was utilized for a p

rocedure performed in the 



                                        



                                        operating room. No interpretation was re

quested. Please refer to the 



                                        



                                        operative report regarding findings. Ple

ase refer to PACS for patient 



                                        



                                        radiation dose information.



                                        



                                         



                                        



                                        Signed: JR Borrego Robert MD



                                        



                                        Report Verified Date/Time:2020

 12:58:42



                                        



                                         



                                        



                                        Reading [facility] [place] [facility]



                                        



                          Electronically signed by: ALFREDO KTAZ

 on 2020 



                                        12:58 PM



                                        



                                                    









                                        Procedure Note

 

                                        Interface, External Ris In - 2020 

 1:00 PM CDT



FINAL REPORT



                                        



                                        PATIENT ID:   [id number]



                                        



                                        A fluoroscopic unit was utilized for a p

rocedure performed in the



                                        operating room. No interpretation was re

quested. Please refer to the



                                        operative report regarding findings. Ple

ase refer to PACS for patient



                                        radiation dose information.



                                        



                                        Signed: JR Borrego Robert MD



                                        Report Verified Date/Time:  2020 1

2:58:42



                                        



                                        Reading Location:  [facility]

y Reading Room



                                            Electronically signed by: ALFREDO BORREGO on 2020 12:58 PM



                                        









                Performing Organization Address         City/State/Zipcode Phone

 Number

 

                [place]                                          



after 2019



Insurance







           Payer      Benefit Plan / Subscriber ID Type       Phone      Address



                      Group                                       

 

           BLUE CROSS/BLUE [employer] HMO   xxxxxxxxxxxx HMO/POS    [phone] [id number]

X [id number]







           SHIELD     BLUE/ESSENTIALS                                  [place]

X



                                                                  [id number]









           Guarantor Name Account Type Relation to Date of    Phone      Billing



                                 Patient    Birth                 Address

 

           [place]   Personal/Family Self       1950 [id number]

Megan Hicks                                  (Home)     [place] [address]







Advance Directives

For more information, please contact:[id number] [place], [place] [phone]





                Code Status     Date Activated  Date Inactivated Comments

 

                Full Code       2020  1:27 AM 2020 12:58 PM 









                    This code status was determined by: Patient

## 2020-08-03 NOTE — RAD REPORT
EXAM DESCRIPTION:  CT Head Without Intravenous Contrast

 

CLINICAL HISTORY:  The patient is 69 years old and is Male; [employer]

 

TECHNIQUE:  Axial computed tomography images of the head/brain without intravenous contrast.   Sagitt
al and coronal reformatted images were created and reviewed.   This CT exam was performed using one o
r more of the following dose reduction techniques:   automated exposure control, adjustment of the mA
 and/or kV according to patient size, and/or use of iterative reconstruction technique.

 

COMPARISON:  CT head April 13, 2020.

 

FINDINGS:  Brain:   Unremarkable.   No hemorrhage.   No significant white matter disease.   No edema.


   Ventricles:   Unremarkable.   No ventriculomegaly.

   Bones/joints:   Unremarkable.   No acute skull fracture.

   Soft tissues:   Unremarkable.

   Sinuses:   Moderate left sphenoid sinus mucosal thickening, slightly improved.

   Mastoid air cells:   No significant mastoid fluid.

 

IMPRESSION:  1.   No acute intracranial findings. No hemorrhage.

2.   Moderate left sphenoid sinus mucosal thickening, slightly improved.

 

Electronically signed by:   Barbie Aj MD   8/1/2020 4:07 AM CDT Workstation: [id number]

 

 

Due to temporary technical issues with the PACS/Fluency reporting system, reports are being signed by
 the in house radiologist without review as a courtesy to ensure prompt reporting. The interpreting r
adiologist is fully responsible for the content of the report.